# Patient Record
Sex: MALE | Race: OTHER | HISPANIC OR LATINO | Employment: UNEMPLOYED | URBAN - METROPOLITAN AREA
[De-identification: names, ages, dates, MRNs, and addresses within clinical notes are randomized per-mention and may not be internally consistent; named-entity substitution may affect disease eponyms.]

---

## 2019-07-03 ENCOUNTER — OFFICE VISIT (OUTPATIENT)
Dept: FAMILY MEDICINE CLINIC | Facility: CLINIC | Age: 29
End: 2019-07-03
Payer: COMMERCIAL

## 2019-07-03 VITALS
TEMPERATURE: 97.8 F | RESPIRATION RATE: 20 BRPM | HEIGHT: 67 IN | OXYGEN SATURATION: 96 % | WEIGHT: 174 LBS | SYSTOLIC BLOOD PRESSURE: 130 MMHG | BODY MASS INDEX: 27.31 KG/M2 | DIASTOLIC BLOOD PRESSURE: 88 MMHG | HEART RATE: 70 BPM

## 2019-07-03 DIAGNOSIS — Z13.220 SCREENING FOR CHOLESTEROL LEVEL: ICD-10-CM

## 2019-07-03 DIAGNOSIS — R73.9 HYPERGLYCEMIA: ICD-10-CM

## 2019-07-03 DIAGNOSIS — M25.512 ACUTE PAIN OF LEFT SHOULDER: Primary | ICD-10-CM

## 2019-07-03 DIAGNOSIS — R03.0 BLOOD PRESSURE ELEVATED WITHOUT HISTORY OF HTN: ICD-10-CM

## 2019-07-03 DIAGNOSIS — Z00.01 ENCOUNTER FOR WELL ADULT EXAM WITH ABNORMAL FINDINGS: ICD-10-CM

## 2019-07-03 DIAGNOSIS — E66.3 OVERWEIGHT (BMI 25.0-29.9): ICD-10-CM

## 2019-07-03 PROCEDURE — 99203 OFFICE O/P NEW LOW 30 MIN: CPT | Performed by: NURSE PRACTITIONER

## 2019-07-03 NOTE — PROGRESS NOTES
Assessment/Plan:    Acute pain of left shoulder  I recommended use of ice over shoulder for 20-30 minutes at a time as often as every 2 hours  If this is not helpful I also advised patient to rotate with heat  Use of NSAIDs or muscle relaxants also recommended  Avoidance of repeated overhead movements  Recommendation is  physical therapy which teaches exercises to help improve movement and strength, and to decrease pain      -Advised to keep his upcoming appointment with ortho to follow up on his shoulder pain and possible injections  BMI Counseling: Body mass index is 27 25 kg/m²  Discussed the patient's BMI with him  The BMI is above average  BMI counseling and education was provided to the patient  Nutrition recommendations include reducing portion sizes, 3-5 servings of fruits/vegetables daily, moderation in carbohydrate intake, increasing intake of lean protein and reducing intake of cholesterol  Exercise recommendations include moderate aerobic physical activity for 150 minutes/week  Diagnoses and all orders for this visit:    Acute pain of left shoulder    Blood pressure elevated without history of HTN    Encounter for well adult exam with abnormal findings  -     CBC and differential; Future    Overweight (BMI 25 0-29 9)  -     TSH, 3rd generation with Free T4 reflex; Future    Screening for cholesterol level  -     Lipid panel; Future    Hyperglycemia  -     Comprehensive metabolic panel; Future          Subjective:      Patient ID: Iban Anna is a 26 y o  male  26year old male patient here to establish care  Was not seeing any PCP  Latest physical 2 years ago through his job  PMHx: no significant medical diagnosis  Has an appointment with DO 7/11/19  Shoulder Pain    The pain is present in the left wrist and left shoulder  This is a recurrent problem  The current episode started more than 1 year ago  There has been a history of trauma  The problem occurs constantly   The problem has been gradually worsening  The quality of the pain is described as aching  The pain is at a severity of 6/10  The pain is moderate  Associated symptoms include a limited range of motion  Pertinent negatives include no fever, inability to bear weight, itching, joint swelling, numbness, stiffness or tingling  The symptoms are aggravated by activity  Treatments tried: steroids  The treatment provided no relief  Family history does not include gout or rheumatoid arthritis  The following portions of the patient's history were reviewed and updated as appropriate: allergies, current medications, past family history, past medical history, past social history, past surgical history and problem list     Review of Systems   Constitutional: Negative  Negative for fatigue and fever  HENT: Negative  Negative for congestion  Eyes: Negative  Respiratory: Negative  Negative for chest tightness, shortness of breath and wheezing  Cardiovascular: Negative  Negative for chest pain and palpitations  Gastrointestinal: Negative  Endocrine: Negative  Genitourinary: Negative  Musculoskeletal: Positive for arthralgias (shoulder pain)  Negative for gait problem, joint swelling, neck pain, neck stiffness and stiffness  Skin: Negative  Negative for itching  Allergic/Immunologic: Negative  Neurological: Negative  Negative for tingling and numbness  Hematological: Negative  Psychiatric/Behavioral: Negative  Objective:      /88   Pulse 70   Temp 97 8 °F (36 6 °C) (Temporal)   Resp 20   Ht 5' 7" (1 702 m)   Wt 78 9 kg (174 lb)   SpO2 96%   BMI 27 25 kg/m²          Physical Exam   Constitutional: He is oriented to person, place, and time  He appears well-developed and well-nourished  No distress  HENT:   Head: Normocephalic and atraumatic  Right Ear: External ear normal    Left Ear: External ear normal    Eyes: Pupils are equal, round, and reactive to light   Conjunctivae and EOM are normal    Neck: Normal range of motion  Neck supple  Cardiovascular: Normal rate, regular rhythm, normal heart sounds and intact distal pulses  Exam reveals no gallop and no friction rub  No murmur heard  Pulmonary/Chest: Effort normal and breath sounds normal  No respiratory distress  He has no wheezes  Abdominal: Soft  Bowel sounds are normal    Musculoskeletal: He exhibits tenderness  Left shoulder: He exhibits decreased range of motion, tenderness and crepitus  He exhibits no swelling  Lymphadenopathy:     He has no cervical adenopathy  Neurological: He is alert and oriented to person, place, and time  Skin: Skin is warm and dry  Capillary refill takes less than 2 seconds  He is not diaphoretic  Nursing note and vitals reviewed

## 2019-07-05 PROBLEM — M25.512 ACUTE PAIN OF LEFT SHOULDER: Status: ACTIVE | Noted: 2019-07-05

## 2019-07-05 NOTE — ASSESSMENT & PLAN NOTE
I recommended use of ice over shoulder for 20-30 minutes at a time as often as every 2 hours  If this is not helpful I also advised patient to rotate with heat  Use of NSAIDs or muscle relaxants also recommended  Avoidance of repeated overhead movements  Recommendation is  physical therapy which teaches exercises to help improve movement and strength, and to decrease pain      -Advised to keep his upcoming appointment with ortho to follow up on his shoulder pain and possible injections

## 2019-07-27 ENCOUNTER — APPOINTMENT (OUTPATIENT)
Dept: LAB | Facility: HOSPITAL | Age: 29
End: 2019-07-27
Payer: COMMERCIAL

## 2019-07-27 DIAGNOSIS — Z13.220 SCREENING FOR CHOLESTEROL LEVEL: ICD-10-CM

## 2019-07-27 DIAGNOSIS — Z00.01 ENCOUNTER FOR WELL ADULT EXAM WITH ABNORMAL FINDINGS: ICD-10-CM

## 2019-07-27 DIAGNOSIS — E66.3 OVERWEIGHT (BMI 25.0-29.9): ICD-10-CM

## 2019-07-27 DIAGNOSIS — R73.9 HYPERGLYCEMIA: ICD-10-CM

## 2019-07-27 LAB
ALBUMIN SERPL BCP-MCNC: 4.2 G/DL (ref 3.5–5)
ALP SERPL-CCNC: 70 U/L (ref 46–116)
ALT SERPL W P-5'-P-CCNC: 35 U/L (ref 12–78)
ANION GAP SERPL CALCULATED.3IONS-SCNC: 8 MMOL/L (ref 4–13)
AST SERPL W P-5'-P-CCNC: 22 U/L (ref 5–45)
BASOPHILS # BLD AUTO: 0.02 THOUSANDS/ΜL (ref 0–0.1)
BASOPHILS NFR BLD AUTO: 1 % (ref 0–1)
BILIRUB SERPL-MCNC: 0.49 MG/DL (ref 0.2–1)
BUN SERPL-MCNC: 17 MG/DL (ref 5–25)
CALCIUM SERPL-MCNC: 8.7 MG/DL (ref 8.3–10.1)
CHLORIDE SERPL-SCNC: 103 MMOL/L (ref 100–108)
CHOLEST SERPL-MCNC: 238 MG/DL (ref 50–200)
CO2 SERPL-SCNC: 28 MMOL/L (ref 21–32)
CREAT SERPL-MCNC: 1.37 MG/DL (ref 0.6–1.3)
EOSINOPHIL # BLD AUTO: 0.04 THOUSAND/ΜL (ref 0–0.61)
EOSINOPHIL NFR BLD AUTO: 2 % (ref 0–6)
ERYTHROCYTE [DISTWIDTH] IN BLOOD BY AUTOMATED COUNT: 12.2 % (ref 11.6–15.1)
GFR SERPL CREATININE-BSD FRML MDRD: 69 ML/MIN/1.73SQ M
GLUCOSE P FAST SERPL-MCNC: 108 MG/DL (ref 65–99)
HCT VFR BLD AUTO: 44.6 % (ref 36.5–49.3)
HDLC SERPL-MCNC: 35 MG/DL (ref 40–60)
HGB BLD-MCNC: 15.8 G/DL (ref 12–17)
IMM GRANULOCYTES # BLD AUTO: 0.01 THOUSAND/UL (ref 0–0.2)
IMM GRANULOCYTES NFR BLD AUTO: 0 % (ref 0–2)
LDLC SERPL CALC-MCNC: 142 MG/DL (ref 0–100)
LYMPHOCYTES # BLD AUTO: 1.39 THOUSANDS/ΜL (ref 0.6–4.47)
LYMPHOCYTES NFR BLD AUTO: 61 % (ref 14–44)
MCH RBC QN AUTO: 31.5 PG (ref 26.8–34.3)
MCHC RBC AUTO-ENTMCNC: 35.4 G/DL (ref 31.4–37.4)
MCV RBC AUTO: 89 FL (ref 82–98)
MONOCYTES # BLD AUTO: 0.16 THOUSAND/ΜL (ref 0.17–1.22)
MONOCYTES NFR BLD AUTO: 7 % (ref 4–12)
NEUTROPHILS # BLD AUTO: 0.67 THOUSANDS/ΜL (ref 1.85–7.62)
NEUTS SEG NFR BLD AUTO: 29 % (ref 43–75)
NONHDLC SERPL-MCNC: 203 MG/DL
NRBC BLD AUTO-RTO: 0 /100 WBCS
PLATELET # BLD AUTO: 246 THOUSANDS/UL (ref 149–390)
PMV BLD AUTO: 10 FL (ref 8.9–12.7)
POTASSIUM SERPL-SCNC: 4.2 MMOL/L (ref 3.5–5.3)
PROT SERPL-MCNC: 7.9 G/DL (ref 6.4–8.2)
RBC # BLD AUTO: 5.01 MILLION/UL (ref 3.88–5.62)
SODIUM SERPL-SCNC: 139 MMOL/L (ref 136–145)
TRIGL SERPL-MCNC: 306 MG/DL
TSH SERPL DL<=0.05 MIU/L-ACNC: 1.96 UIU/ML (ref 0.36–3.74)
WBC # BLD AUTO: 2.29 THOUSAND/UL (ref 4.31–10.16)

## 2019-07-27 PROCEDURE — 80061 LIPID PANEL: CPT

## 2019-07-27 PROCEDURE — 80053 COMPREHEN METABOLIC PANEL: CPT

## 2019-07-27 PROCEDURE — 36415 COLL VENOUS BLD VENIPUNCTURE: CPT

## 2019-07-27 PROCEDURE — 85025 COMPLETE CBC W/AUTO DIFF WBC: CPT

## 2019-07-27 PROCEDURE — 84443 ASSAY THYROID STIM HORMONE: CPT

## 2019-07-30 ENCOUNTER — OFFICE VISIT (OUTPATIENT)
Dept: FAMILY MEDICINE CLINIC | Facility: CLINIC | Age: 29
End: 2019-07-30
Payer: COMMERCIAL

## 2019-07-30 VITALS
BODY MASS INDEX: 27.62 KG/M2 | OXYGEN SATURATION: 97 % | HEIGHT: 67 IN | HEART RATE: 76 BPM | TEMPERATURE: 97 F | RESPIRATION RATE: 20 BRPM | SYSTOLIC BLOOD PRESSURE: 124 MMHG | WEIGHT: 176 LBS | DIASTOLIC BLOOD PRESSURE: 84 MMHG

## 2019-07-30 DIAGNOSIS — R89.9 ABNORMAL LABORATORY TEST: ICD-10-CM

## 2019-07-30 DIAGNOSIS — R73.9 HYPERGLYCEMIA: ICD-10-CM

## 2019-07-30 DIAGNOSIS — Z00.00 ANNUAL PHYSICAL EXAM: Primary | ICD-10-CM

## 2019-07-30 DIAGNOSIS — L98.9 LESION OF LOWER EXTREMITY: ICD-10-CM

## 2019-07-30 DIAGNOSIS — E78.2 MIXED HYPERLIPIDEMIA: ICD-10-CM

## 2019-07-30 PROCEDURE — 99395 PREV VISIT EST AGE 18-39: CPT | Performed by: NURSE PRACTITIONER

## 2019-07-30 RX ORDER — MELOXICAM 15 MG/1
15 TABLET ORAL DAILY
COMMUNITY
End: 2019-08-08 | Stop reason: HOSPADM

## 2019-07-30 NOTE — PROGRESS NOTES
ADULT ANNUAL PHYSICAL  [facility]    NAME: Vinod Haddad  AGE: 26 y o  SEX: male  : 1990     DATE: 2019     Assessment and Plan:     Problem List Items Addressed This Visit        Other    Annual physical exam - Primary     Patient is here for physical exam we find this visit without any distress or abnormalities  We recommended exercise at least three and 0 5 hours per week and healthy diet with a low carbohydrate and low saturated fat  Began to follow up in one year for regular physical exams  Mixed hyperlipidemia     Recommend  lifestyle modifications like eating a heart healthy diet, regular exercises, and maintaining a  desirable body weight  Advise on reduced added sugars, increase omega-3, eliminate trans fat and saturated fat  Also recommend to avoid fried and fatty foods and limit sodium intake  Other Visit Diagnoses     Hyperglycemia        Relevant Orders    HEMOGLOBIN A1C W/ EAG ESTIMATION    Abnormal laboratory test        Relevant Orders    CBC and differential    Lesion of lower extremity        Relevant Orders    Ambulatory referral to Dermatology          Immunizations and preventive care screenings were discussed with patient today  Appropriate education was printed on patient's after visit summary  Counseling:  · BMI Counseling: Body mass index is 27 57 kg/m²  Discussed the patient's BMI with him  The BMI is above average  BMI counseling and education was provided to the patient  Nutrition recommendations include reducing portion sizes, 3-5 servings of fruits/vegetables daily, reducing fast food intake, decreasing soda and/or juice intake, moderation in carbohydrate intake and reducing intake of cholesterol  Exercise recommendations include moderate aerobic physical activity for 150 minutes/week  Return in about 6 months (around 2020) for Recheck cholesterol       Chief Complaint:     Chief Complaint   Patient presents with    Physical Exam      History of Present Illness:     Adult Annual Physical   Patient here for a comprehensive physical exam  The patient reports no problems  Diet and Physical Activity  · Diet/Nutrition: high fat diet  · Exercise: no formal exercise  Depression Screening  PHQ-9 Depression Screening    PHQ-9:    Frequency of the following problems over the past two weeks:            General Health  · Sleep: sleeps well  · Hearing: normal - bilateral   · Vision: no vision problems and wears glasses  · Dental: brushes teeth once daily and brushes teeth twice daily   Health  · History of STDs?: no      Review of Systems:     Review of Systems   Constitutional: Negative  Negative for appetite change, fatigue and unexpected weight change  HENT: Negative  Eyes: Negative  Respiratory: Negative  Negative for cough, chest tightness and wheezing  Cardiovascular: Negative  Negative for chest pain and palpitations  Gastrointestinal: Negative  Endocrine: Negative  Genitourinary: Negative  Musculoskeletal: Negative  Negative for arthralgias and gait problem  Skin: Negative  Negative for color change and rash  Allergic/Immunologic: Negative  Neurological: Negative  Hematological: Negative  Negative for adenopathy  Does not bruise/bleed easily  Psychiatric/Behavioral: Negative  Past Medical History:     History reviewed  No pertinent past medical history  Past Surgical History:     History reviewed  No pertinent surgical history     Social History:     Social History     Socioeconomic History    Marital status: Single     Spouse name: None    Number of children: None    Years of education: None    Highest education level: None   Occupational History    None   Social Needs    Financial resource strain: None    Food insecurity:     Worry: None     Inability: None    Transportation needs:     Medical: None     Non-medical: None   Tobacco Use    Smoking status: Never Smoker    Smokeless tobacco: Never Used   Substance and Sexual Activity    Alcohol use: Yes     Frequency: Monthly or less    Drug use: Never    Sexual activity: Yes     Partners: Female   Lifestyle    Physical activity:     Days per week: None     Minutes per session: None    Stress: None   Relationships    Social connections:     Talks on phone: None     Gets together: None     Attends Christian service: None     Active member of club or organization: None     Attends meetings of clubs or organizations: None     Relationship status: None    Intimate partner violence:     Fear of current or ex partner: None     Emotionally abused: None     Physically abused: None     Forced sexual activity: None   Other Topics Concern    None   Social History Narrative    None      Family History:     Family History   Problem Relation Age of Onset    Hypertension Mother     Hyperlipidemia Mother     Hypertension Father     Hyperlipidemia Father       Current Medications:     Current Outpatient Medications   Medication Sig Dispense Refill    meloxicam (MOBIC) 15 mg tablet Take 15 mg by mouth daily       No current facility-administered medications for this visit  Allergies: Allergies   Allergen Reactions    No Known Allergies       Physical Exam:     /84   Pulse 76   Temp (!) 97 °F (36 1 °C) (Temporal)   Resp 20   Ht 5' 7" (1 702 m)   Wt 79 8 kg (176 lb)   SpO2 97%   BMI 27 57 kg/m²     Physical Exam   Constitutional: He is oriented to person, place, and time  He appears well-developed and well-nourished  No distress  HENT:   Head: Normocephalic and atraumatic  Right Ear: External ear normal    Left Ear: External ear normal    Nose: Nose normal    Mouth/Throat: Oropharynx is clear and moist    Eyes: Pupils are equal, round, and reactive to light  Conjunctivae and EOM are normal  Right eye exhibits no discharge   Left eye exhibits no discharge  Neck: Normal range of motion  Neck supple  No thyromegaly present  Cardiovascular: Normal rate, regular rhythm and intact distal pulses  Exam reveals no gallop and no friction rub  No murmur heard  Pulmonary/Chest: Effort normal and breath sounds normal  No respiratory distress  He has no wheezes  Abdominal: Soft  Bowel sounds are normal  He exhibits no distension  There is no tenderness  Musculoskeletal: Normal range of motion  Neurological: He is alert and oriented to person, place, and time  He has normal reflexes  Skin: Skin is warm and dry  Capillary refill takes less than 2 seconds  Rash noted  He is not diaphoretic  There is erythema  Psychiatric: He has a normal mood and affect  His behavior is normal  Judgment and thought content normal    Nursing note and vitals reviewed        Sarai Guerrero, [address]

## 2019-07-30 NOTE — PATIENT INSTRUCTIONS

## 2019-08-07 ENCOUNTER — APPOINTMENT (EMERGENCY)
Dept: RADIOLOGY | Facility: HOSPITAL | Age: 29
DRG: 287 | End: 2019-08-07
Payer: COMMERCIAL

## 2019-08-07 ENCOUNTER — APPOINTMENT (EMERGENCY)
Dept: NON INVASIVE DIAGNOSTICS | Facility: HOSPITAL | Age: 29
DRG: 287 | End: 2019-08-07
Payer: COMMERCIAL

## 2019-08-07 ENCOUNTER — HOSPITAL ENCOUNTER (INPATIENT)
Facility: HOSPITAL | Age: 29
LOS: 1 days | Discharge: HOME | DRG: 287 | End: 2019-08-08
Attending: EMERGENCY MEDICINE | Admitting: INTERNAL MEDICINE
Payer: COMMERCIAL

## 2019-08-07 DIAGNOSIS — I10 HYPERTENSION: ICD-10-CM

## 2019-08-07 DIAGNOSIS — N28.9 RENAL INSUFFICIENCY: ICD-10-CM

## 2019-08-07 DIAGNOSIS — R73.09 ELEVATED GLUCOSE: ICD-10-CM

## 2019-08-07 DIAGNOSIS — I20.1 CORONARY VASOSPASM (HCC): ICD-10-CM

## 2019-08-07 DIAGNOSIS — I21.4 NSTEMI (NON-ST ELEVATED MYOCARDIAL INFARCTION) (HCC): Primary | ICD-10-CM

## 2019-08-07 PROBLEM — N18.2 STAGE 2 CHRONIC KIDNEY DISEASE: Status: ACTIVE | Noted: 2019-08-07

## 2019-08-07 PROBLEM — R07.9 CHEST PAIN: Status: ACTIVE | Noted: 2019-08-07

## 2019-08-07 PROBLEM — N18.30 STAGE 3 CHRONIC KIDNEY DISEASE (HCC): Status: ACTIVE | Noted: 2019-08-07

## 2019-08-07 LAB
AMPHETAMINES SERPL QL SCN: NEGATIVE
ANION GAP SERPL CALCULATED.3IONS-SCNC: 8 MMOL/L (ref 4–13)
APTT PPP: 31 SECONDS (ref 23–37)
APTT PPP: 53 SECONDS (ref 23–37)
APTT PPP: 70 SECONDS (ref 23–37)
BACTERIA UR QL AUTO: NORMAL /HPF
BARBITURATES UR QL: NEGATIVE
BASOPHILS # BLD AUTO: 0.02 THOUSANDS/ΜL (ref 0–0.1)
BASOPHILS NFR BLD AUTO: 1 % (ref 0–1)
BENZODIAZ UR QL: NEGATIVE
BILIRUB UR QL STRIP: NEGATIVE
BUN SERPL-MCNC: 14 MG/DL (ref 5–25)
CALCIUM SERPL-MCNC: 9.7 MG/DL (ref 8.3–10.1)
CHLORIDE SERPL-SCNC: 101 MMOL/L (ref 100–108)
CK MB SERPL-MCNC: 2.2 % (ref 0–2.5)
CK MB SERPL-MCNC: 4.4 NG/ML (ref 0–5)
CK SERPL-CCNC: 203 U/L (ref 39–308)
CLARITY UR: CLEAR
CO2 SERPL-SCNC: 30 MMOL/L (ref 21–32)
COCAINE UR QL: NEGATIVE
COLOR UR: YELLOW
CREAT SERPL-MCNC: 1.44 MG/DL (ref 0.6–1.3)
EOSINOPHIL # BLD AUTO: 0.02 THOUSAND/ΜL (ref 0–0.61)
EOSINOPHIL NFR BLD AUTO: 1 % (ref 0–6)
ERYTHROCYTE [DISTWIDTH] IN BLOOD BY AUTOMATED COUNT: 11.7 % (ref 11.6–15.1)
GFR SERPL CREATININE-BSD FRML MDRD: 65 ML/MIN/1.73SQ M
GLUCOSE SERPL-MCNC: 120 MG/DL (ref 65–140)
GLUCOSE UR STRIP-MCNC: NEGATIVE MG/DL
HCT VFR BLD AUTO: 45.7 % (ref 36.5–49.3)
HGB BLD-MCNC: 16.3 G/DL (ref 12–17)
HGB UR QL STRIP.AUTO: NEGATIVE
IMM GRANULOCYTES # BLD AUTO: 0.02 THOUSAND/UL (ref 0–0.2)
IMM GRANULOCYTES NFR BLD AUTO: 1 % (ref 0–2)
INR PPP: 0.96 (ref 0.84–1.19)
KETONES UR STRIP-MCNC: NEGATIVE MG/DL
LEUKOCYTE ESTERASE UR QL STRIP: NEGATIVE
LYMPHOCYTES # BLD AUTO: 1.91 THOUSANDS/ΜL (ref 0.6–4.47)
LYMPHOCYTES NFR BLD AUTO: 45 % (ref 14–44)
MCH RBC QN AUTO: 31.2 PG (ref 26.8–34.3)
MCHC RBC AUTO-ENTMCNC: 35.7 G/DL (ref 31.4–37.4)
MCV RBC AUTO: 88 FL (ref 82–98)
METHADONE UR QL: NEGATIVE
MONOCYTES # BLD AUTO: 0.26 THOUSAND/ΜL (ref 0.17–1.22)
MONOCYTES NFR BLD AUTO: 6 % (ref 4–12)
NEUTROPHILS # BLD AUTO: 2.03 THOUSANDS/ΜL (ref 1.85–7.62)
NEUTS SEG NFR BLD AUTO: 46 % (ref 43–75)
NITRITE UR QL STRIP: NEGATIVE
NON-SQ EPI CELLS URNS QL MICRO: NORMAL /HPF
NRBC BLD AUTO-RTO: 0 /100 WBCS
NT-PROBNP SERPL-MCNC: 22 PG/ML
OPIATES UR QL SCN: NEGATIVE
PCP UR QL: NEGATIVE
PH UR STRIP.AUTO: 5.5 [PH] (ref 4.5–8)
PLATELET # BLD AUTO: 275 THOUSANDS/UL (ref 149–390)
PMV BLD AUTO: 9.2 FL (ref 8.9–12.7)
POTASSIUM SERPL-SCNC: 3.9 MMOL/L (ref 3.5–5.3)
PROT UR STRIP-MCNC: ABNORMAL MG/DL
PROTHROMBIN TIME: 12.9 SECONDS (ref 11.6–14.5)
RBC # BLD AUTO: 5.22 MILLION/UL (ref 3.88–5.62)
RBC #/AREA URNS AUTO: NORMAL /HPF
SODIUM SERPL-SCNC: 139 MMOL/L (ref 136–145)
SP GR UR STRIP.AUTO: 1.02 (ref 1–1.03)
THC UR QL: NEGATIVE
TROPONIN I SERPL-MCNC: 4.16 NG/ML
TROPONIN I SERPL-MCNC: 5.65 NG/ML
TROPONIN I SERPL-MCNC: 6.18 NG/ML
UROBILINOGEN UR QL STRIP.AUTO: 0.2 E.U./DL
WBC # BLD AUTO: 4.26 THOUSAND/UL (ref 4.31–10.16)
WBC #/AREA URNS AUTO: NORMAL /HPF

## 2019-08-07 PROCEDURE — 81001 URINALYSIS AUTO W/SCOPE: CPT

## 2019-08-07 PROCEDURE — 82550 ASSAY OF CK (CPK): CPT | Performed by: EMERGENCY MEDICINE

## 2019-08-07 PROCEDURE — 85025 COMPLETE CBC W/AUTO DIFF WBC: CPT | Performed by: EMERGENCY MEDICINE

## 2019-08-07 PROCEDURE — 80048 BASIC METABOLIC PNL TOTAL CA: CPT | Performed by: EMERGENCY MEDICINE

## 2019-08-07 PROCEDURE — 84484 ASSAY OF TROPONIN QUANT: CPT | Performed by: EMERGENCY MEDICINE

## 2019-08-07 PROCEDURE — 93306 TTE W/DOPPLER COMPLETE: CPT

## 2019-08-07 PROCEDURE — 71046 X-RAY EXAM CHEST 2 VIEWS: CPT

## 2019-08-07 PROCEDURE — 85730 THROMBOPLASTIN TIME PARTIAL: CPT | Performed by: INTERNAL MEDICINE

## 2019-08-07 PROCEDURE — 99223 1ST HOSP IP/OBS HIGH 75: CPT | Performed by: INTERNAL MEDICINE

## 2019-08-07 PROCEDURE — 81002 URINALYSIS NONAUTO W/O SCOPE: CPT | Performed by: EMERGENCY MEDICINE

## 2019-08-07 PROCEDURE — 99291 CRITICAL CARE FIRST HOUR: CPT | Performed by: EMERGENCY MEDICINE

## 2019-08-07 PROCEDURE — 99291 CRITICAL CARE FIRST HOUR: CPT

## 2019-08-07 PROCEDURE — 36415 COLL VENOUS BLD VENIPUNCTURE: CPT | Performed by: EMERGENCY MEDICINE

## 2019-08-07 PROCEDURE — 96375 TX/PRO/DX INJ NEW DRUG ADDON: CPT

## 2019-08-07 PROCEDURE — 96374 THER/PROPH/DIAG INJ IV PUSH: CPT

## 2019-08-07 PROCEDURE — 83880 ASSAY OF NATRIURETIC PEPTIDE: CPT | Performed by: EMERGENCY MEDICINE

## 2019-08-07 PROCEDURE — 82553 CREATINE MB FRACTION: CPT | Performed by: EMERGENCY MEDICINE

## 2019-08-07 PROCEDURE — 80307 DRUG TEST PRSMV CHEM ANLYZR: CPT | Performed by: EMERGENCY MEDICINE

## 2019-08-07 PROCEDURE — 85730 THROMBOPLASTIN TIME PARTIAL: CPT | Performed by: EMERGENCY MEDICINE

## 2019-08-07 PROCEDURE — 85610 PROTHROMBIN TIME: CPT | Performed by: EMERGENCY MEDICINE

## 2019-08-07 PROCEDURE — 99222 1ST HOSP IP/OBS MODERATE 55: CPT | Performed by: INTERNAL MEDICINE

## 2019-08-07 PROCEDURE — 84484 ASSAY OF TROPONIN QUANT: CPT | Performed by: INTERNAL MEDICINE

## 2019-08-07 PROCEDURE — 93306 TTE W/DOPPLER COMPLETE: CPT | Performed by: INTERNAL MEDICINE

## 2019-08-07 RX ORDER — CLOPIDOGREL BISULFATE 75 MG/1
600 TABLET ORAL ONCE
Status: COMPLETED | OUTPATIENT
Start: 2019-08-07 | End: 2019-08-07

## 2019-08-07 RX ORDER — ATORVASTATIN CALCIUM 40 MG/1
40 TABLET, FILM COATED ORAL
Status: DISCONTINUED | OUTPATIENT
Start: 2019-08-07 | End: 2019-08-07 | Stop reason: SDUPTHER

## 2019-08-07 RX ORDER — MAGNESIUM HYDROXIDE/ALUMINUM HYDROXICE/SIMETHICONE 120; 1200; 1200 MG/30ML; MG/30ML; MG/30ML
30 SUSPENSION ORAL ONCE
Status: COMPLETED | OUTPATIENT
Start: 2019-08-07 | End: 2019-08-07

## 2019-08-07 RX ORDER — HEPARIN SODIUM 1000 [USP'U]/ML
2000 INJECTION, SOLUTION INTRAVENOUS; SUBCUTANEOUS AS NEEDED
Status: DISCONTINUED | OUTPATIENT
Start: 2019-08-07 | End: 2019-08-08 | Stop reason: HOSPADM

## 2019-08-07 RX ORDER — HEPARIN SODIUM 1000 [USP'U]/ML
4000 INJECTION, SOLUTION INTRAVENOUS; SUBCUTANEOUS ONCE
Status: COMPLETED | OUTPATIENT
Start: 2019-08-07 | End: 2019-08-07

## 2019-08-07 RX ORDER — ATORVASTATIN CALCIUM 80 MG/1
80 TABLET, FILM COATED ORAL
Status: DISCONTINUED | OUTPATIENT
Start: 2019-08-07 | End: 2019-08-08 | Stop reason: HOSPADM

## 2019-08-07 RX ORDER — LIDOCAINE HYDROCHLORIDE 20 MG/ML
10 SOLUTION OROPHARYNGEAL ONCE
Status: COMPLETED | OUTPATIENT
Start: 2019-08-07 | End: 2019-08-07

## 2019-08-07 RX ORDER — NITROGLYCERIN 0.4 MG/1
0.4 TABLET SUBLINGUAL ONCE
Status: COMPLETED | OUTPATIENT
Start: 2019-08-07 | End: 2019-08-07

## 2019-08-07 RX ORDER — KETOROLAC TROMETHAMINE 30 MG/ML
15 INJECTION, SOLUTION INTRAMUSCULAR; INTRAVENOUS ONCE
Status: COMPLETED | OUTPATIENT
Start: 2019-08-07 | End: 2019-08-07

## 2019-08-07 RX ORDER — HEPARIN SODIUM 1000 [USP'U]/ML
4000 INJECTION, SOLUTION INTRAVENOUS; SUBCUTANEOUS AS NEEDED
Status: DISCONTINUED | OUTPATIENT
Start: 2019-08-07 | End: 2019-08-08 | Stop reason: HOSPADM

## 2019-08-07 RX ORDER — ASPIRIN 81 MG/1
81 TABLET ORAL DAILY
Status: DISCONTINUED | OUTPATIENT
Start: 2019-08-08 | End: 2019-08-07 | Stop reason: SDUPTHER

## 2019-08-07 RX ORDER — NITROGLYCERIN 0.4 MG/1
0.4 TABLET SUBLINGUAL
Status: DISCONTINUED | OUTPATIENT
Start: 2019-08-07 | End: 2019-08-08 | Stop reason: HOSPADM

## 2019-08-07 RX ORDER — ASPIRIN 325 MG
325 TABLET ORAL ONCE
Status: COMPLETED | OUTPATIENT
Start: 2019-08-07 | End: 2019-08-07

## 2019-08-07 RX ORDER — HEPARIN SODIUM 10000 [USP'U]/100ML
3-20 INJECTION, SOLUTION INTRAVENOUS
Status: DISCONTINUED | OUTPATIENT
Start: 2019-08-07 | End: 2019-08-08 | Stop reason: HOSPADM

## 2019-08-07 RX ORDER — ASPIRIN 81 MG/1
81 TABLET ORAL DAILY
Status: DISCONTINUED | OUTPATIENT
Start: 2019-08-08 | End: 2019-08-08 | Stop reason: HOSPADM

## 2019-08-07 RX ORDER — SODIUM CHLORIDE 9 MG/ML
100 INJECTION, SOLUTION INTRAVENOUS CONTINUOUS
Status: DISCONTINUED | OUTPATIENT
Start: 2019-08-07 | End: 2019-08-08

## 2019-08-07 RX ADMIN — METOPROLOL TARTRATE 12.5 MG: 25 TABLET ORAL at 20:40

## 2019-08-07 RX ADMIN — NITROGLYCERIN 0.4 MG: 0.4 TABLET SUBLINGUAL at 09:46

## 2019-08-07 RX ADMIN — HEPARIN SODIUM AND DEXTROSE 12 UNITS/KG/HR: 10000; 5 INJECTION INTRAVENOUS at 09:52

## 2019-08-07 RX ADMIN — CLOPIDOGREL BISULFATE 600 MG: 75 TABLET ORAL at 10:56

## 2019-08-07 RX ADMIN — METOPROLOL TARTRATE 12.5 MG: 25 TABLET ORAL at 16:22

## 2019-08-07 RX ADMIN — SODIUM CHLORIDE 75 ML/HR: 0.9 INJECTION, SOLUTION INTRAVENOUS at 10:37

## 2019-08-07 RX ADMIN — KETOROLAC TROMETHAMINE 15 MG: 30 INJECTION, SOLUTION INTRAMUSCULAR at 08:30

## 2019-08-07 RX ADMIN — ATORVASTATIN CALCIUM 80 MG: 80 TABLET, FILM COATED ORAL at 16:22

## 2019-08-07 RX ADMIN — ALUMINUM HYDROXIDE, MAGNESIUM HYDROXIDE, AND SIMETHICONE 30 ML: 200; 200; 20 SUSPENSION ORAL at 08:33

## 2019-08-07 RX ADMIN — ASPIRIN 325 MG ORAL TABLET 325 MG: 325 PILL ORAL at 09:31

## 2019-08-07 RX ADMIN — HEPARIN SODIUM 2000 UNITS: 1000 INJECTION INTRAVENOUS; SUBCUTANEOUS at 20:42

## 2019-08-07 RX ADMIN — LIDOCAINE HYDROCHLORIDE 10 ML: 20 SOLUTION ORAL; TOPICAL at 22:13

## 2019-08-07 RX ADMIN — ALUMINUM HYDROXIDE, MAGNESIUM HYDROXIDE, AND SIMETHICONE 30 ML: 200; 200; 20 SUSPENSION ORAL at 22:12

## 2019-08-07 RX ADMIN — LIDOCAINE HYDROCHLORIDE 10 ML: 20 SOLUTION ORAL; TOPICAL at 08:34

## 2019-08-07 RX ADMIN — NITROGLYCERIN 1 INCH: 20 OINTMENT TOPICAL at 09:33

## 2019-08-07 RX ADMIN — HEPARIN SODIUM 4000 UNITS: 1000 INJECTION, SOLUTION INTRAVENOUS; SUBCUTANEOUS at 09:48

## 2019-08-07 NOTE — PROGRESS NOTES
RN reported troponins 6 18 and 5 65 to PA with Cardiology  Pt continues on heparin gtt  Cardiac cath for 08/8/19    Pt comfortable, has no pain  Pt has call bell in hand  [facility]

## 2019-08-07 NOTE — LETTER
[address]  Dept: [phone]    August 8, 2019     Patient: Beth Meier   YOB: 1990   Date of Visit: 8/7/2019       To Whom it May Concern:    Beth Meier is under my professional care  He was seen in the hospital from 8/7/2019   to 08/08/19  He was hospitalized for a serious medical condition  His wife Marya Lane was present in the hospital with him  If you have any questions or concerns, please don't hesitate to call           Sincerely,          Bita Lane MD

## 2019-08-07 NOTE — ED PROVIDER NOTES
History  Chief Complaint   Patient presents with    Chest Pain     Chest pain that started yesterday - reports radiation to b/l shoulders  Denies SOB, headache, dizziness  History provided by:  Patient   used: No    Medical Problem - Major   Location:  [facility] with burning in the shoulders  Severity:  Moderate  Onset quality:  Sudden  Duration: since this am when he woke up and had it for 4 hours yesterday in the morning upon awakening yesterday as well from 7am - 11 am    Timing:  Constant  Progression:  Unchanged  Chronicity:  New  Relieved by:  Nothing  Worsened by:  Nothing  Ineffective treatments:  None tried although he noted his blood pressure was high as well yesterday and took a dose of his wife's HCTZ 12 5 yesterday  Recent physical at PCP recently revealed a normal blood pressure  Associated symptoms: chest pain    Associated symptoms: no abdominal pain, no congestion, no cough, no diarrhea, no fever, no headaches, no nausea, no rash, no shortness of breath, no sore throat and no vomiting        Prior to Admission Medications   Prescriptions Last Dose Informant Patient Reported? Taking?   meloxicam (MOBIC) 15 mg tablet Past Week at Unknown time Self Yes Yes   Sig: Take 15 mg by mouth daily      Facility-Administered Medications: None       History reviewed  No pertinent past medical history  History reviewed  No pertinent surgical history  Family History   Problem Relation Age of Onset    Hypertension Mother     Hyperlipidemia Mother     Hypertension Father     Hyperlipidemia Father      I have reviewed and agree with the history as documented  Social History     Tobacco Use    Smoking status: Never Smoker    Smokeless tobacco: Never Used   Substance Use Topics    Alcohol use: Yes     Frequency: Monthly or less    Drug use: Never        Review of Systems   Constitutional: Negative for chills and fever  HENT: Negative for congestion and sore throat  Respiratory: Negative for cough, chest tightness and shortness of breath  Cardiovascular: Positive for chest pain  Negative for leg swelling  Gastrointestinal: Negative for abdominal pain, diarrhea, nausea and vomiting  Genitourinary: Negative for difficulty urinating and dysuria  Musculoskeletal: Negative for back pain and gait problem  Skin: Negative for rash  Neurological: Negative for weakness, numbness and headaches  All other systems reviewed and are negative  Physical Exam  Physical Exam   Constitutional: He appears well-developed and well-nourished  He is cooperative  Non-toxic appearance  He does not have a sickly appearance  He does not appear ill  No distress  HENT:   Head: Normocephalic and atraumatic  Right Ear: Hearing normal  No drainage or swelling  Left Ear: Hearing normal  No drainage or swelling  Mouth/Throat: Mucous membranes are normal    Eyes: Conjunctivae and lids are normal  Right eye exhibits no discharge  Left eye exhibits no discharge  Neck: Trachea normal and normal range of motion  No JVD present  Cardiovascular: Normal rate, regular rhythm, normal heart sounds, intact distal pulses and normal pulses  Exam reveals no gallop and no friction rub  No murmur heard  Pulmonary/Chest: Effort normal and breath sounds normal  No stridor  No respiratory distress  He has no wheezes  He has no rales  He exhibits no tenderness  Abdominal: Soft  Normal appearance  There is no tenderness  There is no rebound, no guarding and no CVA tenderness  Musculoskeletal: Normal range of motion  He exhibits no edema, tenderness or deformity  Lymphadenopathy:     He has no cervical adenopathy  Neurological: He is alert  He has normal strength  No sensory deficit  He exhibits normal muscle tone  GCS eye subscore is 4  GCS verbal subscore is 5  GCS motor subscore is 6  Skin: Skin is warm, dry and intact  No rash noted  He is not diaphoretic  No pallor     Psychiatric: He has a normal mood and affect  His speech is normal  Cognition and memory are normal    Nursing note and vitals reviewed        Vital Signs  ED Triage Vitals   Temperature Pulse Respirations Blood Pressure SpO2   08/07/19 0803 08/07/19 0802 08/07/19 0802 08/07/19 0802 08/07/19 0802   98 2 °F (36 8 °C) (!) 53 18 165/100 100 %      Temp Source Heart Rate Source Patient Position - Orthostatic VS BP Location FiO2 (%)   08/07/19 0803 08/07/19 0802 08/07/19 0802 08/07/19 0802 --   Oral Monitor Lying Right arm       Pain Score       08/07/19 0830       9           Vitals:    08/07/19 0802 08/07/19 0912 08/07/19 0945   BP: 165/100 148/96 152/95   Pulse: (!) 53 60 64   Patient Position - Orthostatic VS: Lying Lying Lying         Visual Acuity      ED Medications  Medications   heparin (porcine) 25,000 units in 250 mL infusion (premix) (has no administration in time range)   heparin (porcine) injection 4,000 Units (has no administration in time range)   heparin (porcine) injection 2,000 Units (has no administration in time range)   ketorolac (TORADOL) injection 15 mg (15 mg Intravenous Given 8/7/19 0830)   aluminum-magnesium hydroxide-simethicone (MYLANTA) 200-200-20 mg/5 mL oral suspension 30 mL (30 mL Oral Given 8/7/19 0833)   Lidocaine Viscous HCl (XYLOCAINE) 2 % mucosal solution 10 mL (10 mL Swish & Swallow Given 8/7/19 0834)   aspirin tablet 325 mg (325 mg Oral Given 8/7/19 0931)   nitroglycerin (NITRO-BID) 2 % TD ointment 1 inch (1 inch Topical Given 8/7/19 0933)   heparin (porcine) injection 4,000 Units (4,000 Units Intravenous Given 8/7/19 0948)   nitroglycerin (NITROSTAT) SL tablet 0 4 mg (0 4 mg Sublingual Given 8/7/19 0946)       Diagnostic Studies  Results Reviewed     Procedure Component Value Units Date/Time    Rapid drug screen, urine [[id number]]  (Normal) Collected:  08/07/19 0907    Lab Status:  Final result Specimen:  Urine, Clean Catch Updated:  08/07/19 0927     Amph/Meth UR Negative     Barbiturate Ur Negative     Benzodiazepine Urine Negative     Cocaine Urine Negative     Methadone Urine Negative     Opiate Urine Negative     PCP Ur Negative     THC Urine Negative    Narrative:       FOR MEDICAL PURPOSES ONLY  IF CONFIRMATION NEEDED PLEASE CONTACT THE LAB WITHIN 5 DAYS      Drug Screen Cutoff Levels:  AMPHETAMINE/METHAMPHETAMINES  1000 ng/mL  BARBITURATES     200 ng/mL  BENZODIAZEPINES     200 ng/mL  COCAINE      300 ng/mL  METHADONE      300 ng/mL  OPIATES      300 ng/mL  PHENCYCLIDINE     25 ng/mL  THC       50 ng/mL      Urine Microscopic [[id number]]  (Normal) Collected:  08/07/19 0918    Lab Status:  Final result Specimen:  Urine, Clean Catch Updated:  08/07/19 0924     RBC, UA None Seen /hpf      WBC, UA None Seen /hpf      Epithelial Cells Occasional /hpf      Bacteria, UA Occasional /hpf     CKMB [[id number]]  (Normal) Collected:  08/07/19 0834    Lab Status:  Final result Specimen:  Blood from Arm, Right Updated:  08/07/19 0920     CK-MB Index 2 2 %      CK-MB 4 4 ng/mL     CK Total with Reflex CKMB [[id number]]  (Normal) Collected:  08/07/19 0834    Lab Status:  Final result Specimen:  Blood from Arm, Right Updated:  08/07/19 0920     Total  U/L     Basic metabolic panel [[id number]]  (Abnormal) Collected:  08/07/19 0834    Lab Status:  Final result Specimen:  Blood from Arm, Right Updated:  08/07/19 0909     Sodium 139 mmol/L      Potassium 3 9 mmol/L      Chloride 101 mmol/L      CO2 30 mmol/L      ANION GAP 8 mmol/L      BUN 14 mg/dL      Creatinine 1 44 mg/dL      Glucose 120 mg/dL      Calcium 9 7 mg/dL      eGFR 65 ml/min/1 73sq m     Narrative:       [facility] guidelines for Chronic Kidney Disease (CKD):     Stage 1 with normal or high GFR (GFR > 90 mL/min/1 73 square meters)    Stage 2 Mild CKD (GFR = 60-89 mL/min/1 73 square meters)    Stage 3A Moderate CKD (GFR = 45-59 mL/min/1 73 square meters)    Stage 3B Moderate CKD (GFR = 30-44 mL/min/1 73 square meters)    Stage 4 Severe CKD (GFR = 15-29 mL/min/1 73 square meters)    Stage 5 End Stage CKD (GFR <15 mL/min/1 73 square meters)  Note: GFR calculation is accurate only with a steady state creatinine    B-type natriuretic peptide [[id number]]  (Normal) Collected:  08/07/19 0834    Lab Status:  Final result Specimen:  Blood from Arm, Right Updated:  08/07/19 0909     NT-proBNP 22 pg/mL     POCT urinalysis dipstick [[id number]]  (Abnormal) Resulted:  08/07/19 0907    Lab Status:  Final result Specimen:  Urine Updated:  08/07/19 0907    ED Urine Macroscopic [[id number]]  (Abnormal) Collected:  08/07/19 0918    Lab Status:  Final result Specimen:  Urine Updated:  08/07/19 0905     Color, UA Yellow     Clarity, UA Clear     pH, UA 5 5     Leukocytes, UA Negative     Nitrite, UA Negative     Protein, UA Trace mg/dl      Glucose, UA Negative mg/dl      Ketones, UA Negative mg/dl      Urobilinogen, UA 0 2 E U /dl      Bilirubin, UA Negative     Blood, UA Negative     Specific Gravity, UA 1 025    Narrative:       CLINITEK RESULT    Troponin I [[id number]]  (Abnormal) Collected:  08/07/19 0834    Lab Status:  Final result Specimen:  Blood from Arm, Right Updated:  08/07/19 0904     Troponin I 4 16 ng/mL     Protime-INR [[id number]]  (Normal) Collected:  08/07/19 0834    Lab Status:  Final result Specimen:  Blood from Arm, Right Updated:  08/07/19 0902     Protime 12 9 seconds      INR 0 96    APTT [[id number]]  (Normal) Collected:  08/07/19 0834    Lab Status:  Final result Specimen:  Blood from Arm, Right Updated:  08/07/19 0902     PTT 31 seconds     CBC and differential [[id number]]  (Abnormal) Collected:  08/07/19 0834    Lab Status:  Final result Specimen:  Blood from Arm, Right Updated:  08/07/19 0842     WBC 4 26 Thousand/uL      RBC 5 22 Million/uL      Hemoglobin 16 3 g/dL      Hematocrit 45 7 %      MCV 88 fL      MCH 31 2 pg      MCHC 35 7 g/dL      RDW 11 7 %      MPV 9 2 fL      Platelets 388 Thousands/uL      nRBC 0 /100 WBCs      Neutrophils Relative 46 %      Immat GRANS % 1 %      Lymphocytes Relative 45 %      Monocytes Relative 6 %      Eosinophils Relative 1 %      Basophils Relative 1 %      Neutrophils Absolute 2 03 Thousands/µL      Immature Grans Absolute 0 02 Thousand/uL      Lymphocytes Absolute 1 91 Thousands/µL      Monocytes Absolute 0 26 Thousand/µL      Eosinophils Absolute 0 02 Thousand/µL      Basophils Absolute 0 02 Thousands/µL                  XR chest 2 views   ED Interpretation by Domi Rodriguez MD (08/07 9610)   I have personally reviewed the x-ray and my findings are: no acute disease  Final Result by Yarelis Murguia MD (08/07 0419)      No acute cardiopulmonary disease  Workstation performed: [id number]                    Procedures  ECG 12 Lead Documentation Only  Date/Time: 8/7/2019 8:17 AM  Performed by: Domi Rodriguez MD  Authorized by: Domi Rodriguez MD     Indications / Diagnosis:  Cp  ECG reviewed by me, the ED Provider: yes    Patient location:  ED  Rate:     ECG rate assessment: bradycardic    Rhythm:     Rhythm: sinus bradycardia    Ectopy:     Ectopy: none    QRS:     QRS axis:  Normal    QRS intervals:  Normal  Conduction:     Conduction: normal    ST segments:     ST segments:  Non-specific (possibly small amount of elevation inferiorly without recipricol changes not meeting STEMI criteria  )  T waves:     T waves: normal    ECG 12 Lead Documentation Only  Date/Time: 8/7/2019 9:32 AM  Performed by: Domi Rodriguez MD  Authorized by: Domi Rodriguez MD     Indications / Diagnosis:  CP reeval trop elevated     ECG reviewed by me, the ED Provider: yes    Patient location:  ED  Interpretation:     Interpretation: normal    Rate:     ECG rate assessment: normal    Rhythm:     Rhythm: sinus rhythm    Ectopy:     Ectopy: none    QRS:     QRS axis:  Normal    QRS intervals:  Normal  Conduction:     Conduction: normal    ST segments: ST segments:  Normal  T waves:     T waves: normal    CriticalCare Time  Performed by: Beni Harrell MD  Authorized by: Beni Harrell MD     Critical care provider statement:     Critical care time (minutes):  45    Critical care time was exclusive of:  Separately billable procedures and treating other patients and teaching time    Critical care was necessary to treat or prevent imminent or life-threatening deterioration of the following conditions: nstemi  Critical care was time spent personally by me on the following activities:  Obtaining history from patient or surrogate, development of treatment plan with patient or surrogate, discussions with consultants, evaluation of patient's response to treatment, examination of patient, review of old charts, re-evaluation of patient's condition, ordering and review of radiographic studies, ordering and review of laboratory studies and ordering and performing treatments and interventions    I assumed direction of critical care for this patient from another provider in my specialty: no             ED Course                               MDM  Number of Diagnoses or Management Options  NSTEMI (non-ST elevated myocardial infarction) [facility]):   Diagnosis management comments: Patient's troponin is elevated  Repeat EKG does not reveal any acute changes  Patient having improved but some continued pain so aspirin nitropaste and heparin were ordered  Cardiology consulted and they will see the patient and he will need to be admitted into the hospital   This was all discussed with the patient  Discussing with Internal Medicine regarding admission         Amount and/or Complexity of Data Reviewed  Clinical lab tests: ordered and reviewed  Tests in the radiology section of CPT®: ordered and reviewed  Tests in the medicine section of CPT®: ordered and reviewed  Discuss the patient with other providers: yes    Patient Progress  Patient progress: stable      Disposition  Final diagnoses:   NSTEMI (non-ST elevated myocardial infarction) [facility])     Time reflects when diagnosis was documented in both MDM as applicable and the Disposition within this note     Time User Action Codes Description Comment    8/7/2019  9:38 AM Natasha Hand Add [I21 4] NSTEMI (non-ST elevated myocardial infarction) [facility])       ED Disposition     None      Follow-up Information    None         Patient's Medications   Discharge Prescriptions    No medications on file     No discharge procedures on file      ED Provider  Electronically Signed by           Michelle Benitez MD  08/07/19 1676

## 2019-08-07 NOTE — H&P
H&P Exam - Gema Malik 26 y o  male MRN: [id number]    Unit/Bed#: ED 15 Encounter: [id number]      Cc: chest pain    History of Present Illness     HPI:  Gema Malik is a 26 y o  male with PMH significant for hyperlipidemia, on diet therapy, and hyperglycemia  He has no family history of coronary disease  Patient presented to the ER for evaluation of chest pain  History is currently taken from patient at the bedside  Patient relates he was in his usual state of health  Yesterday morning approximately 7:30 a m  He was awakened from sleep by chest pain  He indicates that was a severe pain in his central chest region  It did not radiate  He denies any associated shortness of breath, nausea, diaphoresis  He notes the pain was constant and lasted approximately 4 hours and then spontaneously resolved  His blood pressure was elevated so he took 1 of his wife's hydrochlorothiazide  Patient's for the rest of yesterday he did not have any symptoms and felt at his baseline  This morning patient was awake and again at approximately 7:30 a m  He developed the identical episode of chest pain  Was in the substernal region  He notes it became more severe with time so he presented to the ER  Again it had no radiation or associated symptoms  It did not have any exacerbating or alleviating factors  He notes when he arrived in the ER the patient had continue to accelerate and was 10/10 intensity  In the ER he received 325 mg of aspirin, 15 mg of Toradol, 1 nitroglycerin sublingually, a GI cocktail, and nitropaste  One patient's troponin was noted to be positive at 4 he was also started on a heparin infusion ACS protocol    Patient notes chest pain is markedly improved  He notes it is essentially resolved and rates it at a 1/10 intensity  Patient denies any previous episodes of chest pain  His mother and father have hypertension and hyperlipidemia both of them, but no coronary disease    His grandfather had a stroke but no history of heart attack  Patient notes he is very active  Denies any decreased exercise tolerance  He denies any fever or chills  Denies any nausea, vomiting, diarrhea  Denies any shortness of breath or cough  Denies any dizziness or lightheadedness  Historical Information   Past Medical History:   Diagnosis Date    Hyperglycemia     Hyperlipidemia      Patient Active Problem List   Diagnosis    Vitamin D deficiency    Functional murmur    De Quervain's tenosynovitis    Acute pain of left shoulder    Annual physical exam    Mixed hyperlipidemia    Hyperlipidemia    Hyperglycemia    Acute non-ST elevation myocardial infarction (NSTEMI) (HCC)    Chest pain    Stage 2 chronic kidney disease     History reviewed  No pertinent surgical history      Social History   Social History     Substance and Sexual Activity   Alcohol Use Yes    Frequency: Monthly or less     Social History     Substance and Sexual Activity   Drug Use Never     Social History     Tobacco Use   Smoking Status Never Smoker   Smokeless Tobacco Never Used       Family History:   Family History   Problem Relation Age of Onset    Hypertension Mother     Hyperlipidemia Mother     Hypertension Father     Hyperlipidemia Father        Meds/Allergies       Current Facility-Administered Medications:     heparin (porcine) 25,000 units in 250 mL infusion (premix), 3-20 Units/kg/hr (Order-Specific), Intravenous, Titrated, Chilango Douglas MD, Last Rate: 9 mL/hr at 08/07/19 0952, 12 Units/kg/hr at 08/07/19 0952    heparin (porcine) injection 2,000 Units, 2,000 Units, Intravenous, PRN, Chilango Douglas MD    heparin (porcine) injection 4,000 Units, 4,000 Units, Intravenous, PRN, Chilango Douglas MD    sodium chloride 0 9 % infusion, 75 mL/hr, Intravenous, Continuous, Emily Rivas PA-C, Last Rate: 75 mL/hr at 08/07/19 1037, 75 mL/hr at 08/07/19 1037    Current Outpatient Medications:     meloxicam (MOBIC) 15 mg tablet, Take 15 mg by mouth daily, Disp: , Rfl:     Allergies   Allergen Reactions    No Known Allergies        Review of Systems  A detailed 12 point review of systems was conducted and is negative apart from those mentioned in the HPI  Objective   Vitals: Blood pressure 132/79, pulse 68, temperature 98 2 °F (36 8 °C), temperature source Oral, resp  rate 18, weight 77 9 kg (171 lb 11 8 oz), SpO2 100 %  Physical Exam   General:  Pleasant male  No acute distress  Not tachypneic  Smiling and joking  HEENT: EOMI, sclera anicteric, dry mucous membranes  Neck: supple  Heart: Regular rate and rhythm, S1S2 present  No murmur, rub or gallop  Lungs; Clear to auscultation bilaterally  No wheezing, crackles or rhonchi  No accessory muscle use or respiratory distress  Abdomen: soft, non-tender, non-distended, NABS  No guarding or rebound  No peritoneal sound or mass  Extremities: no clubbing, cyanosis, or edema  2+ pedal pulses bilaterally  Neurologic:  Awake and alert  Fluent speech  Skin: warm and dry  No petechiae, purpura or rash  Lab Results:   Results from last 7 days   Lab Units 08/07/19  0834   WBC Thousand/uL 4 26*   HEMOGLOBIN g/dL 16 3   HEMATOCRIT % 45 7   PLATELETS Thousands/uL 275     Results from last 7 days   Lab Units 08/07/19  0834   POTASSIUM mmol/L 3 9   CHLORIDE mmol/L 101   CO2 mmol/L 30   BUN mg/dL 14   CREATININE mg/dL 1 44*   CALCIUM mg/dL 9 7         Imaging:  Chest x-ray:  No acute disease    Urinalysis:  Unremarkable  Urine drug screen:  Negative    EKG:  Normal sinus rhythm  T-wave inversion in lead 3  No ST elevation    Assessment/Plan     1-chest pain secondary to acute non STEMI type 1:  Patient presented with substernal chest pain, without associated symptoms, trigger, exacerbating or alleviating factors or radiation, for 2 days in a row  His EKG does not have any acute ischemic changes however his troponin is 4    His presentation is quite concerning for an acute non STEMI/acute coronary syndrome   -patient is essentially pain free after aspirin, nitrates, and being started on heparin infusion   -he has already been seen by the Cardiology consultation at the bedside:  Cardiac catheterization pending  Anticipate Plavix load   -patient is pain free  If his pain were to occur will also start him on a nitroglycerin infusion   -start Lipitor 40 mg daily    2-hyperlipidemia:  Patient's be maintained on diet therapy as an outpatient  His blood work from 07/27 was reviewed with a total cholesterol of 238, triglycerides 306, HDL 35,     3-hyperglycemia:  Patient noted to have mild hyperglycemia the office  Check A1c    4-probable chronic kidney disease stage 2:  Patient's baseline creatinine appears to range 1 3 on his blood work from last month  Current creatinine is 1 4  Suspect he has underlying chronic kidney disease stage 2     5-family:  Called pt's wife Juwan Hendrickson ([phone]), with pts permission and LM to call my cell number for update     Discussed with patient's nurse at the bedside  Discussed with Cardiology team: Moses Olguin PA-C  Discussed with patient's ER physician Dr Mita Anderson      Disposition:  Due to patient's acute non STEMI, and the need for further workup with cardiac catheterization patient be admitted to the hospital   He is currently on IV heparin infusion  It is anticipated this length of stay will be 2 midnights and be placed on inpatient status  Code Status: Full            Portions of the record may have been created with voice recognition software

## 2019-08-07 NOTE — CONSULTS
Consult - Cardiology   [facility] 26 y o  male MRN: [id number]  Unit/Bed#: ED 15 Encounter: [id number]        Reason For Consult:  Chest pain/ elevated troponin                ASSESSMENT:  1  Chest pain/ NSTEMI   -type 1 vs type 2   -EKGs personally reviewed, no STEMI, nonspecific ST changes   -no reciprocal changes   -initial troponin 4 16    2  Dyslipidemia:   -cholesterol 238, triglycerides 306, HDL 35,     3  Essential hypertension   -BP elevated at home 160/ 110's (please see HPI)   -currently trending 140/90    4  Elevated serum creatinine, 1 44, baseline unclear    PLAN:     1  Chest pain/ NSTEMI:   -will arrange for cardiac catheterization today   -stat echo performed in ED   -keep NPO   -start gentle IV fluids   -full-dose aspirin given this a m , load 600 Plavix     -continue heparin drip and nitroglycerin patch    2  Dyslipidemia:   -will initiate statin therapy post catheterization    3  Essential hypertension:   -continue to monitor   -will add medical therapy pending results of catheterization    4  Elevated serum creatinine:   -BMP tomorrow   -continue fluids after catheterization      History Of Present Illness: This is a 54-year-old gentleman without much of significant past medical history  He in fact denies any past medical history to me  He has no cardiac history and has never seen a cardiologist   He does not have a significant family history of cardiac disease though he notes his grandfather did have a heart attack in his later years  Reviewing his recent lab work he does have dyslipidemia and renal insufficiency  He regularly follows with primary care LACY Andrew) with his last office visit being 07/30/2019  Takes no home medications  He does not abuse tobacco, street drugs, or drink alcohol in excess    He was formally employed as a  but has been out of work for the last 5 months secondary to a surgical procedure in his wrist     Yesterday morning August 6, 2019 this gentleman awoke around 7:30 a m  with sharp substernal chest pain  This radiated to his bilateral shoulders  It was a 10/10  He checked his blood pressure noticed that is in the 160s/110s  At this point he took 1 of his wife's blood pressure pills  He is unsure of the exact medication  He states after this his pain gradually decreased on its own  He had no further chest pain that day  Again he awoke this morning around 7:30 a m  With the same chest pain symptoms  He took his blood pressure again and noticed that it was elevated to the same level  Instead of taking 1 of his wife's blood pressure pills he drove himself to the emergency department for evaluation  Upon arrival his troponin was elevated to 4  He was started on heparin drip and nitroglycerin patch with almost complete resolution of his symptoms  Past Medical History:        History reviewed  No pertinent past medical history  History reviewed  No pertinent surgical history  Allergy:        Allergies   Allergen Reactions    No Known Allergies        Medications:       Prior to Admission medications    Medication Sig Start Date End Date Taking?  Authorizing Provider   meloxicam (MOBIC) 15 mg tablet Take 15 mg by mouth daily   Yes Historical Provider, MD       Family History:     Family History   Problem Relation Age of Onset   [facility] Hypertension Mother     Hyperlipidemia Mother     Hypertension Father     Hyperlipidemia Father         Social History:       Social History     Socioeconomic History    Marital status: /Civil Union     Spouse name: None    Number of children: None    Years of education: None    Highest education level: None   Occupational History    None   Social Needs    Financial resource strain: None    Food insecurity:     Worry: None     Inability: None    Transportation needs:     Medical: None     Non-medical: None   Tobacco Use    Smoking status: Never Smoker    Smokeless tobacco: Never Used   Substance and Sexual Activity    Alcohol use: Yes     Frequency: Monthly or less    Drug use: Never    Sexual activity: Yes     Partners: Female   Lifestyle    Physical activity:     Days per week: None     Minutes per session: None    Stress: None   Relationships    Social connections:     Talks on phone: None     Gets together: None     Attends Orthodox service: None     Active member of club or organization: None     Attends meetings of clubs or organizations: None     Relationship status: None    Intimate partner violence:     Fear of current or ex partner: None     Emotionally abused: None     Physically abused: None     Forced sexual activity: None   Other Topics Concern    None   Social History Narrative    None       ROS:  Symptoms per HPI  Remainder review of systems is negative    Exam:  General:  alert, oriented and in no distress, cooperative  Head: Normocephalic, atraumatic  Eyes:  EOMI  Pupils - equal, round, reactive to accomodation  No icterus  Normal Conjunctiva  Oropharynx: moist and normal-appearing mucosa  Neck: supple, symmetrical, trachea midline and no JVD  Heart:  RRR, No: murmer, rub or gallop, S1 & S2 normal   Respiratory effort / Chest Inspection: unlabored  Lungs:  normal air entry, lungs clear to auscultation and no rales, rhonchi or wheezing   Abdomen: flat, normal findings: bowel sounds normal and soft, non-tender  Lower Limbs:  no pitting edema  Pulses[de-identified]  RLE - DP: present                  LLE - DP: present   Musculoskeletal: ROM grossly normal        DATA:      ECG:    Normal sinus rhythm  Non-specific ST changes  No STEMI                   Telemetry: Normal sinus rhythm,   HR 60's          Echocardiogram:  Pending        Ischemic Testing:  Pending         Weights: Wt Readings from Last 3 Encounters:   08/07/19 77 9 kg (171 lb 11 8 oz)   07/30/19 79 8 kg (176 lb)   07/03/19 78 9 kg (174 lb)   , Body mass index is 26 9 kg/m²           Lab Studies:    Results from last 7 days   Lab Units 08/07/19  0834   CK TOTAL U/L 203   TROPONIN I ng/mL 4 16*   CK MB INDEX % 2 2          Results from last 7 days   Lab Units 08/07/19  0834   WBC Thousand/uL 4 26*   HEMOGLOBIN g/dL 16 3   HEMATOCRIT % 45 7   PLATELETS Thousands/uL 275   ,   Results from last 7 days   Lab Units 08/07/19  0834   POTASSIUM mmol/L 3 9   CHLORIDE mmol/L 101   CO2 mmol/L 30   BUN mg/dL 14   CREATININE mg/dL 1 44*   CALCIUM mg/dL 9 7

## 2019-08-07 NOTE — PLAN OF CARE
Problem: CARDIOVASCULAR - ADULT  Goal: Maintains optimal cardiac output and hemodynamic stability  Description  INTERVENTIONS:  - Monitor I/O, vital signs and rhythm  - Monitor for S/S and trends of decreased cardiac output i e  bleeding, hypotension  - Administer and titrate ordered vasoactive medications to optimize hemodynamic stability  - Assess quality of pulses, skin color and temperature  - Assess for signs of decreased coronary artery perfusion - ex   Angina  - Instruct patient to report change in severity of symptoms  Outcome: Progressing  Goal: Absence of cardiac dysrhythmias or at baseline rhythm  Description  INTERVENTIONS:  - Continuous cardiac monitoring, monitor vital signs, obtain 12 lead EKG if indicated  - Administer antiarrhythmic and heart rate control medications as ordered  - Monitor electrolytes and administer replacement therapy as ordered  Outcome: Progressing     Problem: METABOLIC, FLUID AND ELECTROLYTES - ADULT  Goal: Electrolytes maintained within normal limits  Description  INTERVENTIONS:  - Monitor labs and assess patient for signs and symptoms of electrolyte imbalances  - Administer electrolyte replacement as ordered  - Monitor response to electrolyte replacements, including repeat lab results as appropriate  - Instruct patient on fluid and nutrition as appropriate  Outcome: Progressing  Goal: Fluid balance maintained  Description  INTERVENTIONS:  - Monitor labs and assess for signs and symptoms of volume excess or deficit  - Monitor I/O and WT  - Instruct patient on fluid and nutrition as appropriate  Outcome: Progressing     Problem: HEMATOLOGIC - ADULT  Goal: Maintains hematologic stability  Description  INTERVENTIONS  - Assess for signs and symptoms of bleeding or hemorrhage  - Monitor labs  - Administer supportive blood products/factors as ordered and appropriate  Outcome: Progressing     Problem: PAIN - ADULT  Goal: Verbalizes/displays adequate comfort level or baseline comfort level  Description  Interventions:  - Encourage patient to monitor pain and request assistance  - Assess pain using appropriate pain scale  - Administer analgesics based on type and severity of pain and evaluate response  - Implement non-pharmacological measures as appropriate and evaluate response  - Consider cultural and social influences on pain and pain management  - Notify physician/advanced practitioner if interventions unsuccessful or patient reports new pain  Outcome: Progressing     Problem: SAFETY ADULT  Goal: Patient will remain free of falls  Description  INTERVENTIONS:  - Assess patient frequently for physical needs  -  Identify cognitive and physical deficits and behaviors that affect risk of falls    -  [place] fall precautions as indicated by assessment   - Educate patient/family on patient safety including physical limitations  - Instruct patient to call for assistance with activity based on assessment  - Modify environment to reduce risk of injury  - Consider OT/PT consult to assist with strengthening/mobility  Outcome: Progressing     Problem: DISCHARGE PLANNING  Goal: Discharge to home or other facility with appropriate resources  Description  INTERVENTIONS:  - Identify barriers to discharge w/patient and caregiver  - Arrange for needed discharge resources and transportation as appropriate  - Identify discharge learning needs (meds, wound care, etc )  - Arrange for interpretive services to assist at discharge as needed  - Refer to Case Management Department for coordinating discharge planning if the patient needs post-hospital services based on physician/advanced practitioner order or complex needs related to functional status, cognitive ability, or social support system  Outcome: Progressing     Problem: Knowledge Deficit  Goal: Patient/family/caregiver demonstrates understanding of disease process, treatment plan, medications, and discharge instructions  Description  Complete learning assessment and assess knowledge base    Interventions:  - Provide teaching at level of understanding  - Provide teaching via preferred learning methods  Outcome: Progressing     Problem: DISCHARGE PLANNING - CARE MANAGEMENT  Goal: Discharge to post-acute care or home with appropriate resources  Description  INTERVENTIONS:  - Conduct assessment to determine patient/family and health care team treatment goals, and need for post-acute services based on payer coverage, community resources, and patient preferences, and barriers to discharge  - Address psychosocial, clinical, and financial barriers to discharge as identified in assessment in conjunction with the patient/family and health care team  - Arrange appropriate level of post-acute services according to patients   needs and preference and payer coverage in collaboration with the physician and health care team  - Communicate with and update the patient/family, physician, and health care team regarding progress on the discharge plan  - Arrange appropriate transportation to post-acute venues  Outcome: Progressing

## 2019-08-07 NOTE — ED NOTES
Per cath lab pt not scheduled for 1100  Pt to be transferred up to E4 at this time        Kade Hoang RN  08/07/19 4749

## 2019-08-08 ENCOUNTER — APPOINTMENT (INPATIENT)
Dept: NON INVASIVE DIAGNOSTICS | Facility: HOSPITAL | Age: 29
DRG: 287 | End: 2019-08-08
Payer: COMMERCIAL

## 2019-08-08 VITALS
TEMPERATURE: 98.1 F | BODY MASS INDEX: 26.16 KG/M2 | OXYGEN SATURATION: 98 % | DIASTOLIC BLOOD PRESSURE: 87 MMHG | WEIGHT: 166.67 LBS | SYSTOLIC BLOOD PRESSURE: 127 MMHG | HEART RATE: 67 BPM | RESPIRATION RATE: 18 BRPM | HEIGHT: 67 IN

## 2019-08-08 PROBLEM — R79.89 TROPONIN I ABOVE REFERENCE RANGE: Status: ACTIVE | Noted: 2019-08-08

## 2019-08-08 PROBLEM — R77.8 TROPONIN I ABOVE REFERENCE RANGE: Status: ACTIVE | Noted: 2019-08-08

## 2019-08-08 PROBLEM — I20.1 CORONARY VASOSPASM (HCC): Status: ACTIVE | Noted: 2019-08-07

## 2019-08-08 LAB
ANION GAP SERPL CALCULATED.3IONS-SCNC: 5 MMOL/L (ref 4–13)
APTT PPP: 79 SECONDS (ref 23–37)
ATRIAL RATE: 59 BPM
BUN SERPL-MCNC: 14 MG/DL (ref 5–25)
CALCIUM SERPL-MCNC: 8.8 MG/DL (ref 8.3–10.1)
CHLORIDE SERPL-SCNC: 104 MMOL/L (ref 100–108)
CO2 SERPL-SCNC: 30 MMOL/L (ref 21–32)
CREAT SERPL-MCNC: 1.22 MG/DL (ref 0.6–1.3)
GFR SERPL CREATININE-BSD FRML MDRD: 79 ML/MIN/1.73SQ M
GLUCOSE SERPL-MCNC: 111 MG/DL (ref 65–140)
P AXIS: 54 DEGREES
POTASSIUM SERPL-SCNC: 3.9 MMOL/L (ref 3.5–5.3)
PR INTERVAL: 158 MS
QRS AXIS: 62 DEGREES
QRSD INTERVAL: 84 MS
QT INTERVAL: 380 MS
QTC INTERVAL: 376 MS
SODIUM SERPL-SCNC: 139 MMOL/L (ref 136–145)
T WAVE AXIS: 40 DEGREES
VENTRICULAR RATE: 59 BPM

## 2019-08-08 PROCEDURE — 93005 ELECTROCARDIOGRAM TRACING: CPT

## 2019-08-08 PROCEDURE — C1894 INTRO/SHEATH, NON-LASER: HCPCS | Performed by: PHYSICIAN ASSISTANT

## 2019-08-08 PROCEDURE — 99152 MOD SED SAME PHYS/QHP 5/>YRS: CPT | Performed by: INTERNAL MEDICINE

## 2019-08-08 PROCEDURE — 99152 MOD SED SAME PHYS/QHP 5/>YRS: CPT | Performed by: PHYSICIAN ASSISTANT

## 2019-08-08 PROCEDURE — C1769 GUIDE WIRE: HCPCS | Performed by: PHYSICIAN ASSISTANT

## 2019-08-08 PROCEDURE — B2111ZZ FLUOROSCOPY OF MULTIPLE CORONARY ARTERIES USING LOW OSMOLAR CONTRAST: ICD-10-PCS | Performed by: INTERNAL MEDICINE

## 2019-08-08 PROCEDURE — 93010 ELECTROCARDIOGRAM REPORT: CPT | Performed by: INTERNAL MEDICINE

## 2019-08-08 PROCEDURE — 99239 HOSP IP/OBS DSCHRG MGMT >30: CPT | Performed by: INTERNAL MEDICINE

## 2019-08-08 PROCEDURE — 80048 BASIC METABOLIC PNL TOTAL CA: CPT | Performed by: INTERNAL MEDICINE

## 2019-08-08 PROCEDURE — 93458 L HRT ARTERY/VENTRICLE ANGIO: CPT | Performed by: PHYSICIAN ASSISTANT

## 2019-08-08 PROCEDURE — 4A023N7 MEASUREMENT OF CARDIAC SAMPLING AND PRESSURE, LEFT HEART, PERCUTANEOUS APPROACH: ICD-10-PCS | Performed by: INTERNAL MEDICINE

## 2019-08-08 PROCEDURE — C1887 CATHETER, GUIDING: HCPCS | Performed by: PHYSICIAN ASSISTANT

## 2019-08-08 PROCEDURE — 93458 L HRT ARTERY/VENTRICLE ANGIO: CPT | Performed by: INTERNAL MEDICINE

## 2019-08-08 PROCEDURE — 85730 THROMBOPLASTIN TIME PARTIAL: CPT | Performed by: INTERNAL MEDICINE

## 2019-08-08 RX ORDER — MAGNESIUM HYDROXIDE/ALUMINUM HYDROXICE/SIMETHICONE 120; 1200; 1200 MG/30ML; MG/30ML; MG/30ML
30 SUSPENSION ORAL EVERY 4 HOURS PRN
Status: DISCONTINUED | OUTPATIENT
Start: 2019-08-08 | End: 2019-08-08 | Stop reason: HOSPADM

## 2019-08-08 RX ORDER — HEPARIN SODIUM 1000 [USP'U]/ML
INJECTION, SOLUTION INTRAVENOUS; SUBCUTANEOUS CODE/TRAUMA/SEDATION MEDICATION
Status: COMPLETED | OUTPATIENT
Start: 2019-08-08 | End: 2019-08-08

## 2019-08-08 RX ORDER — MIDAZOLAM HYDROCHLORIDE 1 MG/ML
INJECTION INTRAMUSCULAR; INTRAVENOUS CODE/TRAUMA/SEDATION MEDICATION
Status: COMPLETED | OUTPATIENT
Start: 2019-08-08 | End: 2019-08-08

## 2019-08-08 RX ORDER — LIDOCAINE HYDROCHLORIDE 10 MG/ML
INJECTION, SOLUTION INFILTRATION; PERINEURAL CODE/TRAUMA/SEDATION MEDICATION
Status: COMPLETED | OUTPATIENT
Start: 2019-08-08 | End: 2019-08-08

## 2019-08-08 RX ORDER — SODIUM CHLORIDE 9 MG/ML
75 INJECTION, SOLUTION INTRAVENOUS CONTINUOUS
Status: DISPENSED | OUTPATIENT
Start: 2019-08-08 | End: 2019-08-08

## 2019-08-08 RX ORDER — ONDANSETRON 2 MG/ML
4 INJECTION INTRAMUSCULAR; INTRAVENOUS EVERY 6 HOURS PRN
Status: DISCONTINUED | OUTPATIENT
Start: 2019-08-08 | End: 2019-08-08 | Stop reason: HOSPADM

## 2019-08-08 RX ORDER — FENTANYL CITRATE 50 UG/ML
INJECTION, SOLUTION INTRAMUSCULAR; INTRAVENOUS CODE/TRAUMA/SEDATION MEDICATION
Status: COMPLETED | OUTPATIENT
Start: 2019-08-08 | End: 2019-08-08

## 2019-08-08 RX ORDER — DILTIAZEM HYDROCHLORIDE 180 MG/1
180 CAPSULE, COATED, EXTENDED RELEASE ORAL DAILY
Qty: 30 CAPSULE | Refills: 0 | Status: CANCELLED | OUTPATIENT
Start: 2019-08-08

## 2019-08-08 RX ORDER — VERAPAMIL HYDROCHLORIDE 2.5 MG/ML
INJECTION, SOLUTION INTRAVENOUS CODE/TRAUMA/SEDATION MEDICATION
Status: COMPLETED | OUTPATIENT
Start: 2019-08-08 | End: 2019-08-08

## 2019-08-08 RX ORDER — DILTIAZEM HYDROCHLORIDE 240 MG/1
240 CAPSULE, COATED, EXTENDED RELEASE ORAL DAILY
Qty: 30 CAPSULE | Refills: 0 | Status: SHIPPED | OUTPATIENT
Start: 2019-08-08 | End: 2019-09-20 | Stop reason: SDUPTHER

## 2019-08-08 RX ORDER — NITROGLYCERIN 0.4 MG/1
0.4 TABLET SUBLINGUAL
Qty: 30 TABLET | Refills: 0 | Status: SHIPPED | OUTPATIENT
Start: 2019-08-08 | End: 2020-09-28

## 2019-08-08 RX ORDER — NITROGLYCERIN 20 MG/100ML
INJECTION INTRAVENOUS CODE/TRAUMA/SEDATION MEDICATION
Status: COMPLETED | OUTPATIENT
Start: 2019-08-08 | End: 2019-08-08

## 2019-08-08 RX ADMIN — FENTANYL CITRATE 50 MCG: 50 INJECTION, SOLUTION INTRAMUSCULAR; INTRAVENOUS at 10:26

## 2019-08-08 RX ADMIN — IOHEXOL 65 ML: 350 INJECTION, SOLUTION INTRAVENOUS at 10:38

## 2019-08-08 RX ADMIN — HEPARIN SODIUM 4000 UNITS: 1000 INJECTION INTRAVENOUS; SUBCUTANEOUS at 10:29

## 2019-08-08 RX ADMIN — SODIUM CHLORIDE 75 ML/HR: 0.9 INJECTION, SOLUTION INTRAVENOUS at 00:11

## 2019-08-08 RX ADMIN — VERAPAMIL HYDROCHLORIDE 2.5 MG: 2.5 INJECTION INTRAVENOUS at 10:29

## 2019-08-08 RX ADMIN — FENTANYL CITRATE 50 MCG: 50 INJECTION, SOLUTION INTRAMUSCULAR; INTRAVENOUS at 10:23

## 2019-08-08 RX ADMIN — NITROGLYCERIN 0.4 MG: 0.4 TABLET SUBLINGUAL at 04:25

## 2019-08-08 RX ADMIN — MIDAZOLAM 2 MG: 1 INJECTION INTRAMUSCULAR; INTRAVENOUS at 10:26

## 2019-08-08 RX ADMIN — NITROGLYCERIN 200 MCG: 20 INJECTION INTRAVENOUS at 10:35

## 2019-08-08 RX ADMIN — MORPHINE SULFATE 2 MG: 2 INJECTION, SOLUTION INTRAMUSCULAR; INTRAVENOUS at 04:38

## 2019-08-08 RX ADMIN — ASPIRIN 81 MG: 81 TABLET, COATED ORAL at 09:03

## 2019-08-08 RX ADMIN — NITROGLYCERIN 200 MCG: 20 INJECTION INTRAVENOUS at 10:29

## 2019-08-08 RX ADMIN — LIDOCAINE HYDROCHLORIDE 1 ML: 10 INJECTION, SOLUTION INFILTRATION; PERINEURAL at 10:27

## 2019-08-08 RX ADMIN — ALUMINUM HYDROXIDE, MAGNESIUM HYDROXIDE, AND SIMETHICONE 30 ML: 200; 200; 20 SUSPENSION ORAL at 04:44

## 2019-08-08 RX ADMIN — MIDAZOLAM 2 MG: 1 INJECTION INTRAMUSCULAR; INTRAVENOUS at 10:23

## 2019-08-08 RX ADMIN — METOPROLOL TARTRATE 12.5 MG: 25 TABLET ORAL at 09:02

## 2019-08-08 NOTE — NURSING NOTE
RN educated pt on post cath care  Pt educated on his new medication, dose, time and s/s  RN discussed and reviewed f/u instructions with pt  Pts wife and mother present at bedside  Pt educated no driving for 24 hours  Pt educated on no lifting 10lbs for the next 24 hours

## 2019-08-08 NOTE — UTILIZATION REVIEW
Initial Clinical Review    Admission: Date/Time/Statement: 8/7/19 @ 1003     Orders Placed This Encounter   Procedures    Inpatient Admission     Standing Status:   Standing     Number of Occurrences:   1     Order Specific Question:   Admitting Physician     Answer:   Isaiah Torres [[id number]]     Order Specific Question:   Level of Care     Answer:   Med Surg [[id number]]     Order Specific Question:   Estimated length of stay     Answer:   More than 2 Midnights     Order Specific Question:   Certification     Answer:   I certify that inpatient services are medically necessary for this patient for a duration of greater than two midnights  See H&P and MD Progress Notes for additional information about the patient's course of treatment  ED Arrival Information     Expected Arrival Acuity Means of Arrival Escorted By Service Admission Type    - 8/7/2019 07:52 Urgent Walk-In Self General Medicine Urgent    Arrival Complaint    chest pain        Chief Complaint   Patient presents with    Chest Pain     Chest pain that started yesterday - reports radiation to b/l shoulders  Denies SOB, headache, dizziness  Assessment/Plan:  44 yo male with PMH HLD & Hyperglycemia presented to ED from home with for eval of Chest pain  He was awakened yesterday am with CP - severe in central chest region that lasted 4 hrs then resolved  His BP was elevated so he took 1 of his wife's HCTZ  This am he was awakened again with identical CP - substernal and more severe so presented to ED  Pain continued to accelerate and was a 10/10 in the ED  He received ASA, IV Toradol, 1 sl NTG,  a GI cocktail, and nitropaste  Started on a heparin infusion per ACS protocol w/ Trop 4  Admitted to IP with Acute NSTEMI  type 1 vs 2  W/U for ACS  He was seen by Cardio and plan for Cardiac Cath, Suspect CKD 2 - baseline cr 1 3 and current 1 4  IVF's, Plavix load, Heparin drip  8/8 Episode of CP 6/10, burning   Rec'd NTG sl x 1 and MS IV     Cardiac Cath: There is no significant coronary artery disease  Elev trop not from type 1  Management should include continuation of medical therapy and aggressive risk factor modification      ED Triage Vitals   Temperature Pulse Respirations Blood Pressure SpO2   08/07/19 0803 08/07/19 0802 08/07/19 0802 08/07/19 0802 08/07/19 0802   98 2 °F (36 8 °C) (!) 53 18 165/100 100 %      Temp Source Heart Rate Source Patient Position - Orthostatic VS BP Location FiO2 (%)   08/07/19 0803 08/07/19 0802 08/07/19 0802 08/07/19 0802 --   Oral Monitor Lying Right arm       Pain Score       08/07/19 0830       9        Wt Readings from Last 1 Encounters:   08/07/19 75 6 kg (166 lb 10 7 oz)     Additional Vital Signs:   08/08/19 0740 98 1 °F (36 7 °C) 62 18  127/75 98 % None (Room air) Lying   08/07/19 2318 97 5 °F (36 4 °C) 68 17  134/85 97 % None (Room air) Sitting   08/07/19 1518 97 7 °F (36 5 °C) 61 18  131/87 99 % None (Room air) Lying   08/07/19 1100  70 20  145/91 99 % None (Room air) Lying   08/07/19 0912  60 20  148/96 97 % None (Room air)        Pertinent Labs/Diagnostic Test Results:   Results from last 7 days   Lab Units 08/07/19  0834   WBC Thousand/uL 4 26*   HEMOGLOBIN g/dL 16 3   HEMATOCRIT % 45 7   PLATELETS Thousands/uL 275   NEUTROS ABS Thousands/µL 2 03     Results from last 7 days   Lab Units 08/08/19  0407 08/07/19  0834   SODIUM mmol/L 139 139   POTASSIUM mmol/L 3 9 3 9   CHLORIDE mmol/L 104 101   CO2 mmol/L 30 30   ANION GAP mmol/L 5 8   BUN mg/dL 14 14   CREATININE mg/dL 1 22 1 44*   EGFR ml/min/1 73sq m 79 65   CALCIUM mg/dL 8 8 9 7     Results from last 7 days   Lab Units 08/08/19  0407 08/07/19  0834   GLUCOSE RANDOM mg/dL 111 120     Results from last 7 days   Lab Units 08/07/19  0834   CK TOTAL U/L 203   CK MB INDEX % 2 2   CK MB ng/mL 4 4     Results from last 7 days   Lab Units 08/07/19  1413 08/07/19  1132 08/07/19  0834   TROPONIN I ng/mL 5 65* 6 18* 4 16*     Results from last 7 days   Lab Units 08/08/19  0331 08/07/19 2014 08/07/19  1413 08/07/19  0834   PROTIME seconds  --   --   --  12 9   INR   --   --   --  0 96   PTT seconds 79* 53* 70* 31     Results from last 7 days   Lab Units 08/07/19  0834   NT-PRO BNP pg/mL 22     Results from last 7 days   Lab Units 08/07/19  0918   CLARITY UA  Clear   COLOR UA  Yellow   SPEC GRAV UA  1 025   PH UA  5 5   GLUCOSE UA mg/dl Negative   KETONES UA mg/dl Negative   BLOOD UA  Negative   PROTEIN UA mg/dl Trace*   NITRITE UA  Negative   BILIRUBIN UA  Negative   UROBILINOGEN UA E U /dl 0 2   LEUKOCYTES UA  Negative   WBC UA /hpf None Seen   RBC UA /hpf None Seen   BACTERIA UA /hpf Occasional   EPITHELIAL CELLS WET PREP /hpf Occasional     Results from last 7 days   Lab Units 08/07/19  0907   AMPH/METH  Negative   BARBITURATE UR  Negative   BENZODIAZEPINE UR  Negative   COCAINE UR  Negative   METHADONE URINE  Negative   OPIATE UR  Negative   PCP UR  Negative   THC UR  Negative     8/7 CXR: No acute cardiopulmonary disease  8/7 EKG: Normal sinus rhythm  T-wave inversion in lead 3    No ST elevation    ED Treatment:   Medication Administration from 08/07/2019 0751 to 08/07/2019 1154       Date/Time Order Dose Route Action     08/07/2019 0830 ketorolac (TORADOL) injection 15 mg 15 mg Intravenous Given     08/07/2019 0833 aluminum-magnesium hydroxide-simethicone (MYLANTA) 200-200-20 mg/5 mL oral suspension 30 mL 30 mL Oral Given     08/07/2019 0834 Lidocaine Viscous HCl (XYLOCAINE) 2 % mucosal solution 10 mL 10 mL Swish & Swallow Given     08/07/2019 0931 aspirin tablet 325 mg 325 mg Oral Given     08/07/2019 0933 nitroglycerin (NITRO-BID) 2 % TD ointment 1 inch 1 inch Topical Given     08/07/2019 0948 heparin (porcine) injection 4,000 Units 4,000 Units Intravenous Given     08/07/2019 0952 heparin (porcine) 25,000 units in 250 mL infusion (premix) 12 Units/kg/hr Intravenous New Bag     08/07/2019 0946 nitroglycerin (NITROSTAT) SL tablet 0 4 mg 0 4 mg Sublingual Given 08/07/2019 1037 sodium chloride 0 9 % infusion 75 mL/hr Intravenous New Bag     08/07/2019 1056 clopidogrel (PLAVIX) tablet 600 mg 600 mg Oral Given        Past Medical History:   Diagnosis Date    Hyperglycemia     Hyperlipidemia      Present on Admission:   Acute non-ST elevation myocardial infarction (NSTEMI) ([facility] [address] )   Chest pain   Hyperglycemia   Hyperlipidemia   Stage 2 chronic kidney disease      Admitting Diagnosis: Chest pain [R07 9]  Hypertension [I10]  Elevated glucose [R73 09]  Renal insufficiency [N28 9]  NSTEMI (non-ST elevated myocardial infarction) ([facility] [address] ) [I21 4]    Age/Sex: 26 y o  male  Admission Orders:  Current Facility-Administered Medications:  aluminum-magnesium hydroxide-simethicone 30 mL Oral Q4H PRN    aspirin 81 mg Oral Daily    atorvastatin 80 mg Oral Daily With Dinner    heparin (porcine) 3-20 Units/kg/hr (Order-Specific Intravenous Titrated    heparin (porcine) 2,000 Units Intravenous PRN    heparin (porcine) 4,000 Units Intravenous PRN    metoprolol tartrate 12 5 mg Oral Q12H [facility]    morphine injection 2 mg Intravenous Q4H PRN  IV X 1 8/8   nitroglycerin 0 4 mg Sublingual Q5 Min PRN  x 1 8/8   ondansetron 4 mg Intravenous Q6H PRN    sodium chloride 75 mL/hr Intravenous Continuous      TELE  ECHO  SCD's  IP CONSULT TO CARDIOLOGY    Network Utilization Review Department  Phone: [phone]; Fax [phone]  [email or web address]  ATTENTION: Please call with any questions or concerns to [phone]  and carefully listen to the prompts so that you are directed to the right person  Send all requests for admission clinical reviews, approved or denied determinations and any other requests to fax [phone]   All voicemails are confidential

## 2019-08-08 NOTE — PROGRESS NOTES
Hemoband removed, pt instructed to keep arm still for 2 more hours d/t his coagulation  Pt is anxious and was educated on the post cath care  Pt's wife at bedside, ordred pts lunch  Pt's mom at bedside as well  Dr Lizbeth Alaniz spoke with pt    Call bell in hand  [facility]

## 2019-08-08 NOTE — QUICK NOTE
Patient seen post cath  No epicardial CAD  RCA noted to spasm during cath  Responded to IC Nitro    CP/NSTEMI possibly from coronary vasospasm  Would start Diltiazem 240mg daily  He should go home with RX for sublingual nitro as well for acute chest pain episodes  Instructed pt on how to use  Follow up in office in 4 weeks

## 2019-08-08 NOTE — NURSING NOTE
Patient reports to PCA that he is having chest pain  Assess patient, vitals stable, EKG done, denies SOB or radiation to shoulder/ arms  Patient describes burning pain of 6/10 on pain scale  SLIM notified  Will continue to monitor

## 2019-08-08 NOTE — PLAN OF CARE
Problem: CARDIOVASCULAR - ADULT  Goal: Maintains optimal cardiac output and hemodynamic stability  Description  INTERVENTIONS:  - Monitor I/O, vital signs and rhythm  - Monitor for S/S and trends of decreased cardiac output i e  bleeding, hypotension  - Administer and titrate ordered vasoactive medications to optimize hemodynamic stability  - Assess quality of pulses, skin color and temperature  - Assess for signs of decreased coronary artery perfusion - ex   Angina  - Instruct patient to report change in severity of symptoms  8/8/2019 1448 by Ema Mercado RN  Outcome: Adequate for Discharge  8/8/2019 1448 by Ema Mercado RN  Outcome: Progressing  8/8/2019 0722 by Ema Mercado RN  Outcome: Progressing  8/8/2019 0721 by Ema Mercado RN  Outcome: Progressing  Goal: Absence of cardiac dysrhythmias or at baseline rhythm  Description  INTERVENTIONS:  - Continuous cardiac monitoring, monitor vital signs, obtain 12 lead EKG if indicated  - Administer antiarrhythmic and heart rate control medications as ordered  - Monitor electrolytes and administer replacement therapy as ordered  8/8/2019 1448 by Ema Mercado RN  Outcome: Adequate for Discharge  8/8/2019 1448 by Ema Mercado RN  Outcome: Progressing  8/8/2019 0722 by Ema Mercado RN  Outcome: Progressing  8/8/2019 0721 by Ema Mercado RN  Outcome: Progressing     Problem: METABOLIC, FLUID AND ELECTROLYTES - ADULT  Goal: Electrolytes maintained within normal limits  Description  INTERVENTIONS:  - Monitor labs and assess patient for signs and symptoms of electrolyte imbalances  - Administer electrolyte replacement as ordered  - Monitor response to electrolyte replacements, including repeat lab results as appropriate  - Instruct patient on fluid and nutrition as appropriate  8/8/2019 1448 by Ema Mercado RN  Outcome: Adequate for Discharge  8/8/2019 1448 by Ema Mercado RN  Outcome: Progressing  8/8/2019 0722 by Ema Mercado RN  Outcome: Progressing  8/8/2019 0721 by Marisol Cheung RN  Outcome: Progressing  Goal: Fluid balance maintained  Description  INTERVENTIONS:  - Monitor labs and assess for signs and symptoms of volume excess or deficit  - Monitor I/O and WT  - Instruct patient on fluid and nutrition as appropriate  8/8/2019 1448 by Marisol Cheung RN  Outcome: Adequate for Discharge  8/8/2019 1448 by Marisol Cheung RN  Outcome: Progressing  8/8/2019 0722 by Marisol Cheung RN  Outcome: Progressing  8/8/2019 0721 by Marisol Cheung RN  Outcome: Progressing     Problem: HEMATOLOGIC - ADULT  Goal: Maintains hematologic stability  Description  INTERVENTIONS  - Assess for signs and symptoms of bleeding or hemorrhage  - Monitor labs  - Administer supportive blood products/factors as ordered and appropriate  8/8/2019 1448 by Marisol Cheung RN  Outcome: Adequate for Discharge  8/8/2019 1448 by Marisol Cheung RN  Outcome: Progressing  8/8/2019 0722 by Marisol Cheung RN  Outcome: Progressing  8/8/2019 0721 by Marisol Cheung RN  Outcome: Progressing     Problem: PAIN - ADULT  Goal: Verbalizes/displays adequate comfort level or baseline comfort level  Description  Interventions:  - Encourage patient to monitor pain and request assistance  - Assess pain using appropriate pain scale  - Administer analgesics based on type and severity of pain and evaluate response  - Implement non-pharmacological measures as appropriate and evaluate response  - Consider cultural and social influences on pain and pain management  - Notify physician/advanced practitioner if interventions unsuccessful or patient reports new pain  8/8/2019 1448 by Marisol Cheung RN  Outcome: Adequate for Discharge  8/8/2019 1448 by Marisol Cheung RN  Outcome: Progressing  8/8/2019 0722 by Marisol Cheung RN  Outcome: Progressing  8/8/2019 0721 by Marisol Cheung RN  Outcome: Progressing     Problem: SAFETY ADULT  Goal: Patient will remain free of falls  Description  INTERVENTIONS:  - Assess patient frequently for physical needs  -  Identify cognitive and physical deficits and behaviors that affect risk of falls  -  [place] fall precautions as indicated by assessment   - Educate patient/family on patient safety including physical limitations  - Instruct patient to call for assistance with activity based on assessment  - Modify environment to reduce risk of injury  - Consider OT/PT consult to assist with strengthening/mobility  8/8/2019 1448 by Afsaneh Ernandez RN  Outcome: Adequate for Discharge  8/8/2019 1448 by Afsaneh Ernandez RN  Outcome: Progressing  8/8/2019 0722 by Afsaneh Ernandez RN  Outcome: Progressing  8/8/2019 0721 by Afsaneh Ernandez RN  Outcome: Progressing     Problem: DISCHARGE PLANNING  Goal: Discharge to home or other facility with appropriate resources  Description  INTERVENTIONS:  - Identify barriers to discharge w/patient and caregiver  - Arrange for needed discharge resources and transportation as appropriate  - Identify discharge learning needs (meds, wound care, etc )  - Arrange for interpretive services to assist at discharge as needed  - Refer to Case Management Department for coordinating discharge planning if the patient needs post-hospital services based on physician/advanced practitioner order or complex needs related to functional status, cognitive ability, or social support system  8/8/2019 1448 by Afsaneh Ernandez RN  Outcome: Adequate for Discharge  8/8/2019 1448 by Afsaneh Ernandez RN  Outcome: Progressing  8/8/2019 0722 by Afsaneh Ernandez RN  Outcome: Progressing  8/8/2019 0721 by Afsaneh Ernandez RN  Outcome: Progressing     Problem: Knowledge Deficit  Goal: Patient/family/caregiver demonstrates understanding of disease process, treatment plan, medications, and discharge instructions  Description  Complete learning assessment and assess knowledge base    Interventions:  - Provide teaching at level of understanding  - Provide teaching via preferred learning methods  8/8/2019 1448 by Tripp Lara RN  Outcome: Adequate for Discharge  8/8/2019 1448 by Tripp Lara RN  Outcome: Progressing  8/8/2019 0722 by Tripp Lara RN  Outcome: Progressing  8/8/2019 0721 by Tripp Lara RN  Outcome: Progressing     Problem: DISCHARGE PLANNING - CARE MANAGEMENT  Goal: Discharge to post-acute care or home with appropriate resources  Description  INTERVENTIONS:  - Conduct assessment to determine patient/family and health care team treatment goals, and need for post-acute services based on payer coverage, community resources, and patient preferences, and barriers to discharge  - Address psychosocial, clinical, and financial barriers to discharge as identified in assessment in conjunction with the patient/family and health care team  - Arrange appropriate level of post-acute services according to patients   needs and preference and payer coverage in collaboration with the physician and health care team  - Communicate with and update the patient/family, physician, and health care team regarding progress on the discharge plan  - Arrange appropriate transportation to post-acute venues  8/8/2019 1448 by Tripp Lara RN  Outcome: Adequate for Discharge  8/8/2019 1448 by Tripp Lara RN  Outcome: Progressing  8/8/2019 0722 by Tripp Lara RN  Outcome: Progressing  8/8/2019 0721 by Tripp Lara RN  Outcome: Progressing

## 2019-08-08 NOTE — DISCHARGE INSTRUCTIONS
Discharge instructions    Please take your medication as directed  You have been started on a pill Cardizem/diltiazem by the cardiologist to help prevent any additional chest pain or spasms of your heart artery  Please take the nitroglycerin, as instructed by the cardiologist, if you experience any chest pain      Please return to the ER with any problems at all, especially any chest pain, difficulty breathing, dizziness, weakness, lightheadedness or any other problems at all

## 2019-08-08 NOTE — PLAN OF CARE
Problem: CARDIOVASCULAR - ADULT  Goal: Maintains optimal cardiac output and hemodynamic stability  Description  INTERVENTIONS:  - Monitor I/O, vital signs and rhythm  - Monitor for S/S and trends of decreased cardiac output i e  bleeding, hypotension  - Administer and titrate ordered vasoactive medications to optimize hemodynamic stability  - Assess quality of pulses, skin color and temperature  - Assess for signs of decreased coronary artery perfusion - ex   Angina  - Instruct patient to report change in severity of symptoms  Outcome: Progressing  Goal: Absence of cardiac dysrhythmias or at baseline rhythm  Description  INTERVENTIONS:  - Continuous cardiac monitoring, monitor vital signs, obtain 12 lead EKG if indicated  - Administer antiarrhythmic and heart rate control medications as ordered  - Monitor electrolytes and administer replacement therapy as ordered  Outcome: Progressing     Problem: METABOLIC, FLUID AND ELECTROLYTES - ADULT  Goal: Electrolytes maintained within normal limits  Description  INTERVENTIONS:  - Monitor labs and assess patient for signs and symptoms of electrolyte imbalances  - Administer electrolyte replacement as ordered  - Monitor response to electrolyte replacements, including repeat lab results as appropriate  - Instruct patient on fluid and nutrition as appropriate  Outcome: Progressing  Goal: Fluid balance maintained  Description  INTERVENTIONS:  - Monitor labs and assess for signs and symptoms of volume excess or deficit  - Monitor I/O and WT  - Instruct patient on fluid and nutrition as appropriate  Outcome: Progressing     Problem: HEMATOLOGIC - ADULT  Goal: Maintains hematologic stability  Description  INTERVENTIONS  - Assess for signs and symptoms of bleeding or hemorrhage  - Monitor labs  - Administer supportive blood products/factors as ordered and appropriate  Outcome: Progressing     Problem: PAIN - ADULT  Goal: Verbalizes/displays adequate comfort level or baseline comfort level  Description  Interventions:  - Encourage patient to monitor pain and request assistance  - Assess pain using appropriate pain scale  - Administer analgesics based on type and severity of pain and evaluate response  - Implement non-pharmacological measures as appropriate and evaluate response  - Consider cultural and social influences on pain and pain management  - Notify physician/advanced practitioner if interventions unsuccessful or patient reports new pain  Outcome: Progressing     Problem: SAFETY ADULT  Goal: Patient will remain free of falls  Description  INTERVENTIONS:  - Assess patient frequently for physical needs  -  Identify cognitive and physical deficits and behaviors that affect risk of falls    -  [place] fall precautions as indicated by assessment   - Educate patient/family on patient safety including physical limitations  - Instruct patient to call for assistance with activity based on assessment  - Modify environment to reduce risk of injury  - Consider OT/PT consult to assist with strengthening/mobility  Outcome: Progressing     Problem: DISCHARGE PLANNING  Goal: Discharge to home or other facility with appropriate resources  Description  INTERVENTIONS:  - Identify barriers to discharge w/patient and caregiver  - Arrange for needed discharge resources and transportation as appropriate  - Identify discharge learning needs (meds, wound care, etc )  - Arrange for interpretive services to assist at discharge as needed  - Refer to Case Management Department for coordinating discharge planning if the patient needs post-hospital services based on physician/advanced practitioner order or complex needs related to functional status, cognitive ability, or social support system  Outcome: Progressing     Problem: Knowledge Deficit  Goal: Patient/family/caregiver demonstrates understanding of disease process, treatment plan, medications, and discharge instructions  Description  Complete learning assessment and assess knowledge base    Interventions:  - Provide teaching at level of understanding  - Provide teaching via preferred learning methods  Outcome: Progressing     Problem: DISCHARGE PLANNING - CARE MANAGEMENT  Goal: Discharge to post-acute care or home with appropriate resources  Description  INTERVENTIONS:  - Conduct assessment to determine patient/family and health care team treatment goals, and need for post-acute services based on payer coverage, community resources, and patient preferences, and barriers to discharge  - Address psychosocial, clinical, and financial barriers to discharge as identified in assessment in conjunction with the patient/family and health care team  - Arrange appropriate level of post-acute services according to patients   needs and preference and payer coverage in collaboration with the physician and health care team  - Communicate with and update the patient/family, physician, and health care team regarding progress on the discharge plan  - Arrange appropriate transportation to post-acute venues  Outcome: Progressing

## 2019-08-08 NOTE — DISCHARGE SUMMARY
Discharge Summary - Medical Iban Anna 26 y o  male MRN: [id number]    [facility] [place] [address] MED SURG Room / Bed: [address] Jose Armando 87 459/E4 -* Encounter: [id number]    BRIEF OVERVIEW      Admission Date: 8/7/2019       Discharge Date:  08/08/2019    Primary Diagnoses  Principal Problem:    Acute non-ST elevation myocardial infarction (NSTEMI) [facility])  Active Problems:    Hyperlipidemia    Hyperglycemia    Chest pain    Stage 2 chronic kidney disease  Resolved Problems:    * No resolved hospital problems  *      Service:  [facility], [facility]  Consulting Providers   Cardiology:  Dr Matamoros and [facility]    Procedures Performed   8/8:  Cardiac catheterization:  CORONARY CIRCULATION:  Left main: Normal   LAD: Normal   Circumflex: Normal   RCA: Normal  NOTE: cath induced vasospasm of prox RCA reversed with IC nitroglycerin    Hospital Studies:  8/7:  Chest x-ray:  No acute disease    Results from last 7 days   Lab Units 08/07/19  0834   WBC Thousand/uL 4 26*   HEMOGLOBIN g/dL 16 3   HEMATOCRIT % 45 7   PLATELETS Thousands/uL 275     Results from last 7 days   Lab Units 08/08/19  0407 08/07/19  0834   POTASSIUM mmol/L 3 9 3 9   CHLORIDE mmol/L 104 101   CO2 mmol/L 30 30   BUN mg/dL 14 14   CREATININE mg/dL 1 22 1 44*   CALCIUM mg/dL 8 8 9 7       History and Physical Exam:  Please refer to the Admission H&P note    Hospital Course by Problem  1-chest pain, and positive troponin secondary to cardiac vasospasm:   with associated non MI elevation in troponin    -Patient presented with substernal chest pain, without associated symptoms, trigger, exacerbating or alleviating factors or radiation, for 2 days in a row  His EKG did not have any acute ischemic changes however his troponin was initially elevated at 4    His presentation was quite concerning for an acute non STEMI/acute coronary syndrome   -patient was evaluated by the cardiology team:  He was started on medical therapy with aspirin, Plavix load, beta-blocker, statin, and heparin ACS protocol    -patient underwent cardiac catheterization today without any significant coronary disease  Patient however was noted to have RCA spasm during the catheterization that resolved with  Nitroglycerin   -patient's case was discussed with the cardiology team prior to discharge:  Patient's chest pain as well as elevated troponins are likely secondary to cardiac vasospasm  The recommend starting diltiazem 240 mg once daily  Patient will also be given a prescription for nitroglycerin sublingually  -patient's positive troponin was secondary to a non MI elevation in troponin secondary to coronary vasospasms     2-hyperlipidemia:  Patient's be maintained on diet therapy as an outpatient  His blood work from 07/27 was reviewed with a total cholesterol of 238, triglycerides 306, HDL 35,   He does not have any significant coronary disease  He will not be initiated on a statin follow up with his primary care provider     3-hyperglycemia:  Patient noted to have mild hyperglycemia the office  A1c still pending  Outpatient follow-up     4-probable chronic kidney disease stage 2:  Patient's baseline creatinine appears to range 1 3 on his blood work from last month  Current creatinine is 1 4  Suspect he has underlying chronic kidney disease stage 2     5-family:  Updated patient as well as his wife and mother at the bedside  Answered all questions  Discussed with Cardiology, Dr Fredi Dai  Discussed with patient's nurse and     Discharge Condition: Improved  Discharge Disposition:  Home    Discharge Note and Physical Exam:   Patient denies any current complaints  Notes discomfort from his right wrist where he has the hemoband place  No other discomfort anywhere else  No shortness of breath  No nausea, vomiting, diarrhea  No other complaints      Vitals:    08/08/19 1245   BP: 133/87   Pulse: 65   Resp: 20 Temp:    SpO2:      General:  Pleasant, non-tachypnic, non-dyspnic  Conversant  Sitting up in bed  Family at the bedside  Heart: Regular rate and rhythm, S1S2 present  No murmur, rub or gallop  Lungs: Clear to auscultation bilaterally, no wheezing, rhonchi, or crackles  Good air movement  No accessory muscle use or respiratory distress  Abdomen: soft, non-tender, non-distended, NABS  No rebound or guarding  No mass or peritoneal signs  Extremities: no clubbing, cyanosis or edema  2+ pedal pulses bilaterally  Neurologic:  Awake alert and oriented  Fluent and goal directed speech  Skin: warm and dry  No petechiae, purpura or rash  Discharge Medications   Please see Medical Reconciliation Discharge Form    Discharge Follow Up Appointments:   LACY Machado': 1 week  DR Gomez: 4 weeks    Discharge  Statement   Total Time Spent today including physical exam, discussion with patient and family, cardiologist, and discharge arrangements/care = 40 minutes      This note has been constructed using a voice recognition system

## 2019-08-08 NOTE — PLAN OF CARE
Problem: CARDIOVASCULAR - ADULT  Goal: Maintains optimal cardiac output and hemodynamic stability  Description  INTERVENTIONS:  - Monitor I/O, vital signs and rhythm  - Monitor for S/S and trends of decreased cardiac output i e  bleeding, hypotension  - Administer and titrate ordered vasoactive medications to optimize hemodynamic stability  - Assess quality of pulses, skin color and temperature  - Assess for signs of decreased coronary artery perfusion - ex   Angina  - Instruct patient to report change in severity of symptoms  Outcome: Progressing  Goal: Absence of cardiac dysrhythmias or at baseline rhythm  Description  INTERVENTIONS:  - Continuous cardiac monitoring, monitor vital signs, obtain 12 lead EKG if indicated  - Administer antiarrhythmic and heart rate control medications as ordered  - Monitor electrolytes and administer replacement therapy as ordered  Outcome: Progressing     Problem: METABOLIC, FLUID AND ELECTROLYTES - ADULT  Goal: Electrolytes maintained within normal limits  Description  INTERVENTIONS:  - Monitor labs and assess patient for signs and symptoms of electrolyte imbalances  - Administer electrolyte replacement as ordered  - Monitor response to electrolyte replacements, including repeat lab results as appropriate  - Instruct patient on fluid and nutrition as appropriate  Outcome: Progressing  Goal: Fluid balance maintained  Description  INTERVENTIONS:  - Monitor labs and assess for signs and symptoms of volume excess or deficit  - Monitor I/O and WT  - Instruct patient on fluid and nutrition as appropriate  Outcome: Progressing     Problem: HEMATOLOGIC - ADULT  Goal: Maintains hematologic stability  Description  INTERVENTIONS  - Assess for signs and symptoms of bleeding or hemorrhage  - Monitor labs  - Administer supportive blood products/factors as ordered and appropriate  Outcome: Progressing     Problem: PAIN - ADULT  Goal: Verbalizes/displays adequate comfort level or baseline comfort level  Description  Interventions:  - Encourage patient to monitor pain and request assistance  - Assess pain using appropriate pain scale  - Administer analgesics based on type and severity of pain and evaluate response  - Implement non-pharmacological measures as appropriate and evaluate response  - Consider cultural and social influences on pain and pain management  - Notify physician/advanced practitioner if interventions unsuccessful or patient reports new pain  Outcome: Progressing     Problem: SAFETY ADULT  Goal: Patient will remain free of falls  Description  INTERVENTIONS:  - Assess patient frequently for physical needs  -  Identify cognitive and physical deficits and behaviors that affect risk of falls    -  [place] fall precautions as indicated by assessment   - Educate patient/family on patient safety including physical limitations  - Instruct patient to call for assistance with activity based on assessment  - Modify environment to reduce risk of injury  - Consider OT/PT consult to assist with strengthening/mobility  Outcome: Progressing     Problem: DISCHARGE PLANNING  Goal: Discharge to home or other facility with appropriate resources  Description  INTERVENTIONS:  - Identify barriers to discharge w/patient and caregiver  - Arrange for needed discharge resources and transportation as appropriate  - Identify discharge learning needs (meds, wound care, etc )  - Arrange for interpretive services to assist at discharge as needed  - Refer to Case Management Department for coordinating discharge planning if the patient needs post-hospital services based on physician/advanced practitioner order or complex needs related to functional status, cognitive ability, or social support system  Outcome: Progressing     Problem: Knowledge Deficit  Goal: Patient/family/caregiver demonstrates understanding of disease process, treatment plan, medications, and discharge instructions  Description  Complete learning assessment and assess knowledge base    Interventions:  - Provide teaching at level of understanding  - Provide teaching via preferred learning methods  Outcome: Progressing     Problem: DISCHARGE PLANNING - CARE MANAGEMENT  Goal: Discharge to post-acute care or home with appropriate resources  Description  INTERVENTIONS:  - Conduct assessment to determine patient/family and health care team treatment goals, and need for post-acute services based on payer coverage, community resources, and patient preferences, and barriers to discharge  - Address psychosocial, clinical, and financial barriers to discharge as identified in assessment in conjunction with the patient/family and health care team  - Arrange appropriate level of post-acute services according to patients   needs and preference and payer coverage in collaboration with the physician and health care team  - Communicate with and update the patient/family, physician, and health care team regarding progress on the discharge plan  - Arrange appropriate transportation to post-acute venues  Outcome: Progressing

## 2019-08-08 NOTE — PLAN OF CARE
Problem: CARDIOVASCULAR - ADULT  Goal: Maintains optimal cardiac output and hemodynamic stability  Description  INTERVENTIONS:  - Monitor I/O, vital signs and rhythm  - Monitor for S/S and trends of decreased cardiac output i e  bleeding, hypotension  - Administer and titrate ordered vasoactive medications to optimize hemodynamic stability  - Assess quality of pulses, skin color and temperature  - Assess for signs of decreased coronary artery perfusion - ex   Angina  - Instruct patient to report change in severity of symptoms  8/8/2019 5390 by Alessandro Baires RN  Outcome: Progressing  8/8/2019 0721 by Alessandro Baires RN  Outcome: Progressing  Goal: Absence of cardiac dysrhythmias or at baseline rhythm  Description  INTERVENTIONS:  - Continuous cardiac monitoring, monitor vital signs, obtain 12 lead EKG if indicated  - Administer antiarrhythmic and heart rate control medications as ordered  - Monitor electrolytes and administer replacement therapy as ordered  8/8/2019 0596 by Alessandro Baires RN  Outcome: Progressing  8/8/2019 0721 by Alessandro Baires RN  Outcome: Progressing     Problem: METABOLIC, FLUID AND ELECTROLYTES - ADULT  Goal: Electrolytes maintained within normal limits  Description  INTERVENTIONS:  - Monitor labs and assess patient for signs and symptoms of electrolyte imbalances  - Administer electrolyte replacement as ordered  - Monitor response to electrolyte replacements, including repeat lab results as appropriate  - Instruct patient on fluid and nutrition as appropriate  8/8/2019 8754 by Alessandro Baires RN  Outcome: Progressing  8/8/2019 0721 by Alessandro Baires RN  Outcome: Progressing  Goal: Fluid balance maintained  Description  INTERVENTIONS:  - Monitor labs and assess for signs and symptoms of volume excess or deficit  - Monitor I/O and WT  - Instruct patient on fluid and nutrition as appropriate  8/8/2019 7100 by Alessandro Baires RN  Outcome: Progressing  8/8/2019 0721 by Alessandro Baires RN  Outcome: Progressing     Problem: HEMATOLOGIC - ADULT  Goal: Maintains hematologic stability  Description  INTERVENTIONS  - Assess for signs and symptoms of bleeding or hemorrhage  - Monitor labs  - Administer supportive blood products/factors as ordered and appropriate  8/8/2019 0722 by Bon Iniguez RN  Outcome: Progressing  8/8/2019 0721 by Bon Iniguez RN  Outcome: Progressing     Problem: PAIN - ADULT  Goal: Verbalizes/displays adequate comfort level or baseline comfort level  Description  Interventions:  - Encourage patient to monitor pain and request assistance  - Assess pain using appropriate pain scale  - Administer analgesics based on type and severity of pain and evaluate response  - Implement non-pharmacological measures as appropriate and evaluate response  - Consider cultural and social influences on pain and pain management  - Notify physician/advanced practitioner if interventions unsuccessful or patient reports new pain  8/8/2019 0722 by Bon Iniguez RN  Outcome: Progressing  8/8/2019 0721 by Bon Iniguez RN  Outcome: Progressing     Problem: SAFETY ADULT  Goal: Patient will remain free of falls  Description  INTERVENTIONS:  - Assess patient frequently for physical needs  -  Identify cognitive and physical deficits and behaviors that affect risk of falls    -  [place] fall precautions as indicated by assessment   - Educate patient/family on patient safety including physical limitations  - Instruct patient to call for assistance with activity based on assessment  - Modify environment to reduce risk of injury  - Consider OT/PT consult to assist with strengthening/mobility  8/8/2019 0722 by Bon Iniguez RN  Outcome: Progressing  8/8/2019 0721 by Bon Iniguez RN  Outcome: Progressing     Problem: DISCHARGE PLANNING  Goal: Discharge to home or other facility with appropriate resources  Description  INTERVENTIONS:  - Identify barriers to discharge w/patient and caregiver  - Arrange for needed discharge resources and transportation as appropriate  - Identify discharge learning needs (meds, wound care, etc )  - Arrange for interpretive services to assist at discharge as needed  - Refer to Case Management Department for coordinating discharge planning if the patient needs post-hospital services based on physician/advanced practitioner order or complex needs related to functional status, cognitive ability, or social support system  8/8/2019 0722 by Madina Ye RN  Outcome: Progressing  8/8/2019 0721 by Madina Ye RN  Outcome: Progressing     Problem: Knowledge Deficit  Goal: Patient/family/caregiver demonstrates understanding of disease process, treatment plan, medications, and discharge instructions  Description  Complete learning assessment and assess knowledge base    Interventions:  - Provide teaching at level of understanding  - Provide teaching via preferred learning methods  8/8/2019 0722 by Madina Ye RN  Outcome: Progressing  8/8/2019 0721 by Madina Ye RN  Outcome: Progressing     Problem: DISCHARGE PLANNING - CARE MANAGEMENT  Goal: Discharge to post-acute care or home with appropriate resources  Description  INTERVENTIONS:  - Conduct assessment to determine patient/family and health care team treatment goals, and need for post-acute services based on payer coverage, community resources, and patient preferences, and barriers to discharge  - Address psychosocial, clinical, and financial barriers to discharge as identified in assessment in conjunction with the patient/family and health care team  - Arrange appropriate level of post-acute services according to patients   needs and preference and payer coverage in collaboration with the physician and health care team  - Communicate with and update the patient/family, physician, and health care team regarding progress on the discharge plan  - Arrange appropriate transportation to post-acute venues  8/8/2019 0419 by Magnolia Waite, RN  Outcome: Progressing  8/8/2019 0721 by Magnolia Waite RN  Outcome: Progressing

## 2019-08-08 NOTE — BRIEF OP NOTE (RAD/CATH)
Right radial catheterization    Findings:  LVEDP 14 mmHg    Normal epicardial coronary arteries    Plan:  Elevated troponin not due to epicardial coronary artery disease

## 2019-08-12 ENCOUNTER — TRANSITIONAL CARE MANAGEMENT (OUTPATIENT)
Dept: FAMILY MEDICINE CLINIC | Facility: CLINIC | Age: 29
End: 2019-08-12

## 2019-08-29 RX ORDER — MELOXICAM 15 MG/1
TABLET ORAL
COMMUNITY
End: 2019-09-20

## 2019-09-20 ENCOUNTER — OFFICE VISIT (OUTPATIENT)
Dept: CARDIOLOGY CLINIC | Facility: CLINIC | Age: 29
End: 2019-09-20
Payer: COMMERCIAL

## 2019-09-20 VITALS
HEIGHT: 67 IN | DIASTOLIC BLOOD PRESSURE: 86 MMHG | SYSTOLIC BLOOD PRESSURE: 128 MMHG | BODY MASS INDEX: 27.34 KG/M2 | WEIGHT: 174.2 LBS | HEART RATE: 65 BPM | RESPIRATION RATE: 16 BRPM

## 2019-09-20 DIAGNOSIS — I20.1 CORONARY VASOSPASM (HCC): ICD-10-CM

## 2019-09-20 DIAGNOSIS — I21.4 NSTEMI (NON-ST ELEVATED MYOCARDIAL INFARCTION) (HCC): Primary | ICD-10-CM

## 2019-09-20 DIAGNOSIS — E78.5 DYSLIPIDEMIA: ICD-10-CM

## 2019-09-20 PROCEDURE — 93000 ELECTROCARDIOGRAM COMPLETE: CPT

## 2019-09-20 PROCEDURE — 99214 OFFICE O/P EST MOD 30 MIN: CPT

## 2019-09-20 RX ORDER — DILTIAZEM HYDROCHLORIDE 240 MG/1
240 CAPSULE, COATED, EXTENDED RELEASE ORAL DAILY
Qty: 90 CAPSULE | Refills: 3 | Status: SHIPPED | OUTPATIENT
Start: 2019-09-20 | End: 2020-10-27 | Stop reason: SDUPTHER

## 2019-09-20 RX ORDER — DILTIAZEM HYDROCHLORIDE 240 MG/1
240 CAPSULE, COATED, EXTENDED RELEASE ORAL DAILY
Qty: 30 CAPSULE | Refills: 0 | Status: SHIPPED | OUTPATIENT
Start: 2019-09-20 | End: 2019-09-20

## 2019-09-20 NOTE — PROGRESS NOTES
Cardiology   [address] [facility]  [facility] [place], [address] [place] [id number]  [phone]  [phone]  09/20/19  Vale Harada   1990  [id number]     Referring Physian: Vinay Max, [address]  [address] [facility], [address] [place] [address]     HPI: Vale Harada is a 26 y o  male with a past medical history of stage 2 chronic kidney disease, dyslipidemia, and recent hospitalization in August 2019 for chest pain which was thought to be secondary to coronary vasospasm  He was seen in the hospital and referred for cardiac catheterization as a inpatient which showed no epicardial coronary stenosis  Vaso spasm was noted during catheterization and responsive to intracoronary nitroglycerin  He was placed on diltiazem 240 mg there after, and has tolerated this well  He presents today for follow-up  He notes that after his hospitalization he felt some lightheadedness initially when beginning diltiazem for few days, however this has completely resolved now  He feels normal   He denies any recurrent chest pain  Review of Systems   Constitutional: Negative for chills and fever  HENT: Negative for facial swelling and sore throat  Eyes: Negative for visual disturbance  Respiratory: Negative for cough, chest tightness, shortness of breath and wheezing  Cardiovascular: Negative for chest pain, palpitations and leg swelling  Gastrointestinal: Negative for abdominal pain, blood in stool, constipation, diarrhea, nausea and vomiting  Endocrine: Negative for cold intolerance and heat intolerance  Genitourinary: Negative for decreased urine volume, difficulty urinating, dysuria and hematuria  Musculoskeletal: Negative for arthralgias, back pain and myalgias  Skin: Negative for rash  Neurological: Negative for dizziness, syncope, weakness and numbness  Psychiatric/Behavioral: Negative for agitation, behavioral problems and confusion  The patient is not nervous/anxious  OBJECTIVE  Vitals:    09/20/19 1132   BP: 128/86   Pulse: 65   Resp: 16       Physical Exam   General appearance: alert and oriented, in no acute distress  Head: Normocephalic, without obvious abnormality, atraumatic  Eyes: conjunctivae/corneas clear  Anicteric  Neck: no adenopathy, no carotid bruit, no JVD  Lungs: clear to auscultation bilaterally  Heart: regular rate and rhythm, S1, S2 normal, no murmur, click, rub or gallop  Abdomen: soft, non-tender; bowel sounds normal; no masses,  no organomegaly  Extremities: extremities normal, warm and well-perfused; no cyanosis, clubbing, or edema  Skin: Skin color, texture, turgor normal  No rashes or lesions     EKG:  Results for orders placed or performed in visit on 09/20/19   POCT ECG    Impression    Sinus rhythm at 65 beats per minute with nonspecific T-wave inversions in the inferior and lateral leads        IMPRESSION:  1  Coronary artery vasospasms  2  Dyslipidemia  3  Elevated blood pressure    DISCUSSION/RECOMMENDATIONS:  Overall the patient is doing well at this time  I have personally reviewed the cardiac catheterization films myself, and with the patient today  There is no epicardial coronary stenosis  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, however I believe this is insignificant with regards to his presentation  In the catheterization report he was noted to have induced coronary vasospasms responsive to intracoronary nitroglycerin  He initially had some side effects with the diltiazem when he 1st started the medication, however they have subsided now  Ordered refill on his medication today  He was noted to have dyslipidemia on his hospital labs  We should repeat these in a few months  His blood pressure is slightly elevated today however he states he ran out of his diltiazem a few days ago  He states when he was taking his blood pressure was controlled    I have sent a refill for the medication today  He will follow-up in 2019 for re-evaluation  If he is doing well at that time will increase his follow-up interval   --------------------------------------------------------------------------------  TREADMILL STRESS  No results found for this or any previous visit    ----------------------------------------------------------------------------------------------  NUCLEAR STRESS TEST: No results found for this or any previous visit   --------------------------------------------------------------------------------  CATH:    Results for orders placed during the hospital encounter of 19   Cardiac catheterization    Narrative 18 [address], [facility]  [phone]    [facility]    Invasive Cardiovascular Lab Complete Report    Patient: Levora Fabry  MR number: [id number]  Account number: [de-identified]  Study date: 2019  Gender: Male  : 1990  Height: 66 9 in  Weight: 166 3 lb  BSA: 1 87 mï¾²    Allergies: NO KNOWN ALLERGIES    Diagnostic Cardiologist:  Elwanda Cranker, DO  Primary Physician:  Balwinder Ponce    SUMMARY    CORONARY CIRCULATION:  Left main: Normal   LAD: Normal   Circumflex: Normal   RCA: Normal     HEMODYNAMICS:  Hemodynamic assessment demonstrated mildly elevated LVEDP  INDICATIONS:  --  elev trop  --  Possible CAD: unstable angina  PROCEDURES PERFORMED    --  Left heart catheterization without ventriculogram   --  Left coronary angiography  --  Right coronary angiography  --  Inpatient  --  Mod Sedation Same Physician Initial 15min  --  Coronary Catheterization (w/ LHC)  PROCEDURE: The risks and alternatives of the procedures and conscious sedation were explained to the patient and informed consent was obtained  The patient was brought to the cath lab and placed on the table   The planned puncture sites  were prepped and draped in the usual sterile fashion  --  Right radial artery access  After performing an Mitchell's test to verify adequate ulnar artery supply to the hand, the radial site was prepped  The puncture site was infiltrated with local anesthetic  The vessel was accessed using the  modified Seldinger technique, a wire was advanced into the vessel, and a sheath was advanced over the wire into the vessel  --  Left heart catheterization without ventriculogram  A catheter was advanced over a guidewire into the ascending aorta  After recording ascending aortic pressure, the catheter was advanced across the aortic valve and left ventricular  pressure was recorded  The catheter was pulled back across the aortic valve and into the ascending aorta and pullback pressures were obtained  --  Left coronary artery angiography  A catheter was advanced over a guidewire into the aorta and positioned in the left coronary artery ostium under fluoroscopic guidance  Angiography was performed  --  Right coronary artery angiography  A catheter was advanced over a guidewire into the aorta and positioned in the right coronary artery ostium under fluoroscopic guidance  Angiography was performed  --  Inpatient  --  Mod Sedation Same Physician Initial 15min  --  Coronary Catheterization (w/ [facility])  PROCEDURE COMPLETION: The patient tolerated the procedure well and was discharged from the cath lab  TIMING: Test started at 10:17  Test concluded at 10:38  HEMOSTASIS: The sheath was removed  The site was compressed with a Hemoband  device  Hemostasis was obtained  MEDICATIONS GIVEN: Fentanyl (1OOmcg/2 ml), 50 mcg, IV, at 10:22  Versed (2mg/2ml), 2 mg, IV, at 10:22  Fentanyl (1OOmcg/2 ml), 50 mcg, IV, at 10:25  Versed (2mg/2ml), 2 mg, IV, at 10:25  1% Lidocaine, 1 ml,  subcutaneously, at 10:25  Verapamil (5mg/2ml), 2 5 mg, IV, at 10:27  Heparin 1000 units/ml, 4,000 units, IV, at 10:27  Nitroglycerin (200mcg/ml), 200 mcg, at 10:27   Nitroglycerine (200mcg/ml), 200 mcg, at 10:34  CONTRAST GIVEN: 65 ml  Omnipaque (350mg I /ml)  RADIATION EXPOSURE: Fluoroscopy time: 3 73 min  HEMODYNAMICS: Hemodynamic assessment demonstrated mildly elevated LVEDP  CORONARY VESSELS:   --  The coronary circulation is right dominant  --  Left main: Normal   --  LAD: Normal   --  Circumflex: Normal   --  RCA: Normal     IMPRESSIONS:  There is no significant coronary artery disease  Elev trop not from type 1 MI    RECOMMENDATIONS  Patient management should include continuation of medical therapy and aggressive risk factor modification  DISPOSITION:  The patient left the catheterization laboratory in stable condition  NOTE: cath induced vasospasm of prox RCA reversed with IC nitroglycerin    Prepared and signed by    Angel Vera DO  Signed 2019 11:44:03    Study diagram    Hemodynamic tables    Pressures:  Baseline  Pressures:  - HR: 73  Pressures:  - Rhythm:  Pressures:  -- Left Ventricle (s/edp): 109/16/--    Outputs:  Baseline  Outputs:  -- CALCULATIONS: [age]  Outputs:  -- CALCULATIONS: Body Surface Area: 1 87  Outputs:  -- CALCULATIONS: Height in cm: 170 00  Outputs:  -- CALCULATIONS: Sex: Male  Outputs:  -- CALCULATIONS: Weight in k 60           --------------------------------------------------------------------------------  ECHO:   Results for orders placed during the hospital encounter of 19   Echo complete with contrast if indicated    Sanket Pierre 48  Margaret Ruiz 35  [facility], [address]  [phone]    Transthoracic Echocardiogram  2D, M-mode, Doppler, and Color Doppler    Study date:  07-Aug-2019    Patient: Mayur Barrientos  MR number: [id number]  Account number: [de-identified]  : 1990  Age: 26 years  Gender: Male  Status: Inpatient  Location: Emergency room  Height: 67 in  Weight: 171 lb  BP: 139/ 82 mmHg    Indications: Chest pain  Increased troponin      Diagnoses: R07 9 - Chest pain, unspecified    Sonographer:  NETTA Nicholson  Referring Physician:  Sofia Bhardwaj MD  Group:  [facility]  Interpreting Physician:  Sofia Bhardwaj MD    SUMMARY    LEFT VENTRICLE:  Systolic function was normal  Ejection fraction was estimated to be 60 %  There were no regional wall motion abnormalities  There was mild concentric hypertrophy  Doppler parameters were consistent with abnormal left ventricular relaxation (grade 1 diastolic dysfunction)  HISTORY: PRIOR HISTORY: Patient has no history of cardiovascular disease  PROCEDURE: The procedure was performed in the emergency room  This was a routine study  The transthoracic approach was used  The study included complete 2D imaging, M-mode, complete spectral Doppler, and color Doppler  The heart rate was  69 bpm, at the start of the study  Image quality was good  LEFT VENTRICLE: Size was normal  Systolic function was normal  Ejection fraction was estimated to be 60 %  There were no regional wall motion abnormalities  There was mild concentric hypertrophy  No evidence of apical thrombus  DOPPLER:  Doppler parameters were consistent with abnormal left ventricular relaxation (grade 1 diastolic dysfunction)  RIGHT VENTRICLE: The size was normal  Systolic function was normal  Wall thickness was normal     LEFT ATRIUM: Size was normal     RIGHT ATRIUM: Size was normal     MITRAL VALVE: Valve structure was normal  There was normal leaflet separation  DOPPLER: The transmitral velocity was within the normal range  There was no evidence for stenosis  There was no significant regurgitation  AORTIC VALVE: The valve was trileaflet  Leaflets exhibited normal thickness and normal cuspal separation  DOPPLER: Transaortic velocity was within the normal range  There was no evidence for stenosis  There was no significant  regurgitation  TRICUSPID VALVE: The valve structure was normal  There was normal leaflet separation   DOPPLER: The transtricuspid velocity was within the normal range  There was no evidence for stenosis  There was no significant regurgitation  PULMONIC VALVE: Leaflets exhibited normal thickness, no calcification, and normal cuspal separation  DOPPLER: The transpulmonic velocity was within the normal range  There was no significant regurgitation  PERICARDIUM: There was no pericardial effusion  The pericardium was normal in appearance  AORTA: The root exhibited normal size  SYSTEMIC VEINS: IVC: The inferior vena cava was normal in size  SYSTEM MEASUREMENT TABLES    2D  %FS: 27 1 %  Ao Diam: 2 6 cm  EDV(Teich): 131 5 ml  EF(Teich): 52 5 %  ESV(Teich): 62 5 ml  IVSd: 1 cm  LA Area: 12 4 cm2  LA Diam: 3 5 cm  LVEDV MOD A4C: 98 ml  LVEF MOD A4C: 62 3 %  LVESV MOD A4C: 37 ml  LVIDd: 5 2 cm  LVIDs: 3 8 cm  LVLd A4C: 8 2 cm  LVLs A4C: 6 4 cm  LVPWd: 0 7 cm  RA Area: 12 8 cm2  RVIDd: 3 cm  SV MOD A4C: 61 1 ml  SV(Teich): 69 ml    MM  TAPSE: 1 9 cm    PW  E': 0 1 m/s  E/E': 9 6  MV A Markus: 0 6 m/s  MV Dec [place]: 2 4 m/s2  MV DecT: 258 2 ms  MV E Markus: 0 6 m/s  MV E/A Ratio: 0 9  MV PHT: 74 9 ms  MVA By PHT: 2 9 cm2    [facility]    Prepared and electronically signed by    Rose Marie Carty MD  Signed 07-Aug-2019 13:08:36       No results found for this or any previous visit   --------------------------------------------------------------------------------  HOLTER  No results found for this or any previous visit   --------------------------------------------------------------------------------  Diagnoses and all orders for this visit:    NSTEMI (non-ST elevated myocardial infarction) ([facility] )  -     POCT ECG    Coronary vasospasm (HCC)  -     Discontinue: diltiazem (CARDIZEM CD) 240 mg 24 hr capsule; Take 1 capsule (240 mg total) by mouth daily  -     diltiazem (CARDIZEM CD) 240 mg 24 hr capsule;  Take 1 capsule (240 mg total) by mouth daily    Dyslipidemia  -     Lipid panel; Future       ======================================================    Past Medical History:   Diagnosis Date    Hyperglycemia     Hyperlipidemia      History reviewed  No pertinent surgical history  Medications  Current Outpatient Medications   Medication Sig Dispense Refill    diltiazem (CARDIZEM CD) 240 mg 24 hr capsule Take 1 capsule (240 mg total) by mouth daily 90 capsule 3    nitroglycerin (NITROSTAT) 0 4 mg SL tablet Place 1 tablet (0 4 mg total) under the tongue every 5 (five) minutes as needed for chest pain 30 tablet 0     No current facility-administered medications for this visit           Allergies   Allergen Reactions    No Known Allergies        Social History     Socioeconomic History    Marital status: /Civil Union     Spouse name: Not on file    Number of children: Not on file    Years of education: Not on file    Highest education level: Not on file   Occupational History    Not on file   Social Needs    Financial resource strain: Not on file    Food insecurity:     Worry: Not on file     Inability: Not on file    Transportation needs:     Medical: Not on file     Non-medical: Not on file   Tobacco Use    Smoking status: Never Smoker    Smokeless tobacco: Never Used   Substance and Sexual Activity    Alcohol use: Yes     Frequency: Monthly or less    Drug use: Never    Sexual activity: Yes     Partners: Female   Lifestyle    Physical activity:     Days per week: Not on file     Minutes per session: Not on file    Stress: Not on file   Relationships    Social connections:     Talks on phone: Not on file     Gets together: Not on file     Attends [employer] service: Not on file     Active member of club or organization: Not on file     Attends meetings of clubs or organizations: Not on file     Relationship status: Not on file    Intimate partner violence:     Fear of current or ex partner: Not on file     Emotionally abused: Not on file     Physically abused: Not on file     Forced sexual activity: Not on file   Other Topics Concern    Not on file   Social History Narrative    Not on file        Family History   Problem Relation Age of Onset    Hypertension Mother     Hyperlipidemia Mother     Hypertension Father     Hyperlipidemia Father        Lab Results   Component Value Date    WBC 4 26 (L) 08/07/2019    HGB 16 3 08/07/2019    HCT 45 7 08/07/2019    MCV 88 08/07/2019     08/07/2019      Lab Results   Component Value Date    SODIUM 139 08/08/2019    K 3 9 08/08/2019     08/08/2019    CO2 30 08/08/2019    BUN 14 08/08/2019    CREATININE 1 22 08/08/2019    GLUC 111 08/08/2019    CALCIUM 8 8 08/08/2019      No results found for: HGBA1C   No results found for: CHOL  Lab Results   Component Value Date    HDL 35 (L) 07/27/2019     Lab Results   Component Value Date    LDLCALC 142 (H) 07/27/2019     Lab Results   Component Value Date    TRIG 306 (H) 07/27/2019     No results found for: [place], [place]   Lab Results   Component Value Date    INR 0 96 08/07/2019    PROTIME 12 9 08/07/2019          Patient Active Problem List    Diagnosis Date Noted    Troponin I above reference range 08/08/2019    Coronary vasospasm ([facility] ) 08/07/2019    Chest pain 08/07/2019    Stage 2 chronic kidney disease 08/07/2019    Hyperlipidemia     Hyperglycemia     Annual physical exam 07/30/2019    Mixed hyperlipidemia 07/30/2019    Acute pain of left shoulder 07/05/2019    De Quervain's tenosynovitis 01/03/2013    Vitamin D deficiency 12/12/2012    Functional murmur 12/06/2012       Portions of the record may have been created with voice recognition software  Occasional wrong word or "sound a like" substitutions may have occurred due to the inherent limitations of voice recognition software  Read the chart carefully and recognize, using context, where substitutions have occurred        La Done, DO  9/20/2019 12:05 PM

## 2020-01-28 ENCOUNTER — OFFICE VISIT (OUTPATIENT)
Dept: FAMILY MEDICINE CLINIC | Facility: CLINIC | Age: 30
End: 2020-01-28
Payer: COMMERCIAL

## 2020-01-28 VITALS
RESPIRATION RATE: 18 BRPM | BODY MASS INDEX: 27.15 KG/M2 | TEMPERATURE: 97 F | OXYGEN SATURATION: 97 % | HEIGHT: 67 IN | WEIGHT: 173 LBS | SYSTOLIC BLOOD PRESSURE: 120 MMHG | DIASTOLIC BLOOD PRESSURE: 80 MMHG | HEART RATE: 84 BPM

## 2020-01-28 DIAGNOSIS — I10 ESSENTIAL HYPERTENSION: ICD-10-CM

## 2020-01-28 DIAGNOSIS — E78.2 MIXED HYPERLIPIDEMIA: ICD-10-CM

## 2020-01-28 DIAGNOSIS — I20.1 CORONARY VASOSPASM (HCC): ICD-10-CM

## 2020-01-28 DIAGNOSIS — M54.50 ACUTE MIDLINE LOW BACK PAIN WITHOUT SCIATICA: Primary | ICD-10-CM

## 2020-01-28 DIAGNOSIS — Z11.4 ENCOUNTER FOR SCREENING FOR HIV: ICD-10-CM

## 2020-01-28 PROCEDURE — 3008F BODY MASS INDEX DOCD: CPT | Performed by: NURSE PRACTITIONER

## 2020-01-28 PROCEDURE — 1036F TOBACCO NON-USER: CPT | Performed by: NURSE PRACTITIONER

## 2020-01-28 PROCEDURE — 3079F DIAST BP 80-89 MM HG: CPT | Performed by: NURSE PRACTITIONER

## 2020-01-28 PROCEDURE — 99214 OFFICE O/P EST MOD 30 MIN: CPT | Performed by: NURSE PRACTITIONER

## 2020-01-28 PROCEDURE — 3074F SYST BP LT 130 MM HG: CPT | Performed by: NURSE PRACTITIONER

## 2020-01-28 RX ORDER — NAPROXEN 500 MG/1
500 TABLET ORAL 2 TIMES DAILY WITH MEALS
Qty: 60 TABLET | Refills: 0 | Status: SHIPPED | OUTPATIENT
Start: 2020-01-28 | End: 2020-09-05

## 2020-01-28 NOTE — PROGRESS NOTES
Assessment/Plan:    Acute midline low back pain without sciatica  Pt recommended continue use of Tylenol to help decrease pain  Pt to continue keeping active and performing some light exercises such as walking and strengthening muscles  Advised to avoid heavy lifting and use of heat to back for 20 to 30 minutes every 2 hours  If symptoms do not improve within 4 to 6 weeks pt advised to start return to clinic and possibly start Physical therapy     -Ordering xray and NSAIDs to trial out  Follow up in 1 month  Mixed hyperlipidemia  -Patient was advised on starting medication  He refused at this time stating he will change his diet regimen  We are ordering lipid panel on patient today to check his cholesterol level  Coronary vasospasm [facility])  --Patient was in hospital 6 months ago due to chest pain secondary to cardiac vasospasm and started on Diltiazem 240mg daily tablet  His EKG showed in ER ischemic changes  He had non-STEMI and underwent cardiac catherization without coronary disease  Pt today was advised to continue on Diltiazem 240mg daily and use of nitro as ordered  Diagnoses and all orders for this visit:    Acute midline low back pain without sciatica  -     XR spine lumbar minimum 4 views non injury; Future  -     naproxen (NAPROSYN) 500 mg tablet; Take 1 tablet (500 mg total) by mouth 2 (two) times a day with meals    Mixed hyperlipidemia  -     Lipid panel; Future    Essential hypertension  -     Comprehensive metabolic panel; Future    Encounter for screening for HIV  -     HIV 1/2 AG-AB combo; Future    Coronary vasospasm (HCC)          Subjective:      Patient ID: Orlando Yeager is a 26 y o  male  Patient presents with: Follow-up: 6 months  Back Pain: x 3weeks      Back Pain   This is a new problem  The current episode started 1 to 4 weeks ago  The problem occurs constantly  The problem is unchanged  The pain is present in the lumbar spine   The quality of the pain is described as aching  The pain does not radiate  The pain is at a severity of 10/10  The pain is severe  The pain is the same all the time  The symptoms are aggravated by standing, bending, sitting and position  Stiffness is present all day  Pertinent negatives include no bladder incontinence, bowel incontinence, chest pain, dysuria, fever, leg pain, numbness or tingling  Risk factors include sedentary lifestyle, poor posture and lack of exercise  He has tried nothing for the symptoms  The treatment provided no relief  The following portions of the patient's history were reviewed and updated as appropriate: allergies, current medications, past family history, past medical history, past social history, past surgical history and problem list     Review of Systems   Constitutional: Negative  Negative for appetite change, fatigue and fever  HENT: Negative  Eyes: Negative  Respiratory: Negative  Negative for cough, chest tightness, shortness of breath and wheezing  Cardiovascular: Negative  Negative for chest pain and palpitations  Gastrointestinal: Negative  Negative for bowel incontinence  Endocrine: Negative  Genitourinary: Negative  Negative for bladder incontinence and dysuria  Musculoskeletal: Positive for back pain  Skin: Negative  Allergic/Immunologic: Negative  Neurological: Negative  Negative for tingling and numbness  Hematological: Negative  Psychiatric/Behavioral: Negative  Objective:      /80 (BP Location: Right arm, Patient Position: Sitting, Cuff Size: Standard)   Pulse 84   Temp (!) 97 °F (36 1 °C) (Oral)   Resp 18   Ht 5' 7" (1 702 m)   Wt 78 5 kg (173 lb)   SpO2 97%   BMI 27 10 kg/m²          Physical Exam   Constitutional: He is oriented to person, place, and time  He appears well-developed and well-nourished  No distress  HENT:   Head: Normocephalic and atraumatic     Right Ear: External ear normal    Left Ear: External ear normal    Nose: Nose normal    Mouth/Throat: Oropharynx is clear and moist    Eyes: Pupils are equal, round, and reactive to light  EOM are normal    Neck: Normal range of motion  Neck supple  Cardiovascular: Normal rate, regular rhythm and normal heart sounds  Exam reveals no gallop and no friction rub  No murmur heard  Pulmonary/Chest: Effort normal and breath sounds normal  No respiratory distress  He has no wheezes  Abdominal: Soft  Bowel sounds are normal    Musculoskeletal: Normal range of motion  Lymphadenopathy:     He has no cervical adenopathy  Neurological: He is alert and oriented to person, place, and time  Skin: Skin is warm and dry  Capillary refill takes less than 2 seconds  He is not diaphoretic  Psychiatric: He has a normal mood and affect  Thought content normal    Nursing note and vitals reviewed

## 2020-01-29 ENCOUNTER — APPOINTMENT (OUTPATIENT)
Dept: RADIOLOGY | Age: 30
End: 2020-01-29
Payer: COMMERCIAL

## 2020-01-29 DIAGNOSIS — M54.50 ACUTE MIDLINE LOW BACK PAIN WITHOUT SCIATICA: ICD-10-CM

## 2020-01-29 PROCEDURE — 72110 X-RAY EXAM L-2 SPINE 4/>VWS: CPT

## 2020-01-29 NOTE — ASSESSMENT & PLAN NOTE
Pt recommended continue use of Tylenol to help decrease pain  Pt to continue keeping active and performing some light exercises such as walking and strengthening muscles  Advised to avoid heavy lifting and use of heat to back for 20 to 30 minutes every 2 hours  If symptoms do not improve within 4 to 6 weeks pt advised to start return to clinic and possibly start Physical therapy     -Ordering xray and NSAIDs to trial out  Follow up in 1 month

## 2020-01-29 NOTE — ASSESSMENT & PLAN NOTE
--Patient was in hospital 6 months ago due to chest pain secondary to cardiac vasospasm and started on Diltiazem 240mg daily tablet  His EKG showed in ER ischemic changes  He had non-STEMI and underwent cardiac catherization without coronary disease  Pt today was advised to continue on Diltiazem 240mg daily and use of nitro as ordered

## 2020-01-29 NOTE — ASSESSMENT & PLAN NOTE
-Patient was advised on starting medication  He refused at this time stating he will change his diet regimen  We are ordering lipid panel on patient today to check his cholesterol level

## 2020-02-29 ENCOUNTER — APPOINTMENT (OUTPATIENT)
Dept: LAB | Facility: HOSPITAL | Age: 30
End: 2020-02-29
Payer: COMMERCIAL

## 2020-02-29 DIAGNOSIS — Z11.4 ENCOUNTER FOR SCREENING FOR HIV: ICD-10-CM

## 2020-02-29 DIAGNOSIS — E78.2 MIXED HYPERLIPIDEMIA: ICD-10-CM

## 2020-02-29 DIAGNOSIS — R89.9 ABNORMAL LABORATORY TEST: ICD-10-CM

## 2020-02-29 DIAGNOSIS — I10 ESSENTIAL HYPERTENSION: ICD-10-CM

## 2020-02-29 DIAGNOSIS — R73.9 HYPERGLYCEMIA: ICD-10-CM

## 2020-02-29 LAB
ALBUMIN SERPL BCP-MCNC: 4.1 G/DL (ref 3.5–5)
ALP SERPL-CCNC: 83 U/L (ref 46–116)
ALT SERPL W P-5'-P-CCNC: 29 U/L (ref 12–78)
ANION GAP SERPL CALCULATED.3IONS-SCNC: 9 MMOL/L (ref 4–13)
AST SERPL W P-5'-P-CCNC: 21 U/L (ref 5–45)
BASOPHILS # BLD AUTO: 0.02 THOUSANDS/ΜL (ref 0–0.1)
BASOPHILS NFR BLD AUTO: 1 % (ref 0–1)
BILIRUB SERPL-MCNC: 0.7 MG/DL (ref 0.2–1)
BUN SERPL-MCNC: 15 MG/DL (ref 5–25)
CALCIUM SERPL-MCNC: 8.9 MG/DL (ref 8.3–10.1)
CHLORIDE SERPL-SCNC: 102 MMOL/L (ref 100–108)
CHOLEST SERPL-MCNC: 166 MG/DL (ref 50–200)
CO2 SERPL-SCNC: 28 MMOL/L (ref 21–32)
CREAT SERPL-MCNC: 1.22 MG/DL (ref 0.6–1.3)
EOSINOPHIL # BLD AUTO: 0.09 THOUSAND/ΜL (ref 0–0.61)
EOSINOPHIL NFR BLD AUTO: 4 % (ref 0–6)
ERYTHROCYTE [DISTWIDTH] IN BLOOD BY AUTOMATED COUNT: 11.8 % (ref 11.6–15.1)
EST. AVERAGE GLUCOSE BLD GHB EST-MCNC: 97 MG/DL
GFR SERPL CREATININE-BSD FRML MDRD: 79 ML/MIN/1.73SQ M
GLUCOSE P FAST SERPL-MCNC: 100 MG/DL (ref 65–99)
HBA1C MFR BLD: 5 %
HCT VFR BLD AUTO: 44.7 % (ref 36.5–49.3)
HDLC SERPL-MCNC: 32 MG/DL
HGB BLD-MCNC: 15.6 G/DL (ref 12–17)
IMM GRANULOCYTES # BLD AUTO: 0.01 THOUSAND/UL (ref 0–0.2)
IMM GRANULOCYTES NFR BLD AUTO: 0 % (ref 0–2)
LDLC SERPL CALC-MCNC: 115 MG/DL (ref 0–100)
LYMPHOCYTES # BLD AUTO: 1.33 THOUSANDS/ΜL (ref 0.6–4.47)
LYMPHOCYTES NFR BLD AUTO: 51 % (ref 14–44)
MCH RBC QN AUTO: 30.9 PG (ref 26.8–34.3)
MCHC RBC AUTO-ENTMCNC: 34.9 G/DL (ref 31.4–37.4)
MCV RBC AUTO: 89 FL (ref 82–98)
MONOCYTES # BLD AUTO: 0.2 THOUSAND/ΜL (ref 0.17–1.22)
MONOCYTES NFR BLD AUTO: 8 % (ref 4–12)
NEUTROPHILS # BLD AUTO: 0.95 THOUSANDS/ΜL (ref 1.85–7.62)
NEUTS SEG NFR BLD AUTO: 36 % (ref 43–75)
NONHDLC SERPL-MCNC: 134 MG/DL
NRBC BLD AUTO-RTO: 0 /100 WBCS
PLATELET # BLD AUTO: 225 THOUSANDS/UL (ref 149–390)
PMV BLD AUTO: 9.5 FL (ref 8.9–12.7)
POTASSIUM SERPL-SCNC: 4.4 MMOL/L (ref 3.5–5.3)
PROT SERPL-MCNC: 7.6 G/DL (ref 6.4–8.2)
RBC # BLD AUTO: 5.05 MILLION/UL (ref 3.88–5.62)
SODIUM SERPL-SCNC: 139 MMOL/L (ref 136–145)
TRIGL SERPL-MCNC: 94 MG/DL
WBC # BLD AUTO: 2.6 THOUSAND/UL (ref 4.31–10.16)

## 2020-02-29 PROCEDURE — 80061 LIPID PANEL: CPT

## 2020-02-29 PROCEDURE — 36415 COLL VENOUS BLD VENIPUNCTURE: CPT

## 2020-02-29 PROCEDURE — 85025 COMPLETE CBC W/AUTO DIFF WBC: CPT

## 2020-02-29 PROCEDURE — 83036 HEMOGLOBIN GLYCOSYLATED A1C: CPT

## 2020-02-29 PROCEDURE — 87389 HIV-1 AG W/HIV-1&-2 AB AG IA: CPT

## 2020-02-29 PROCEDURE — 80053 COMPREHEN METABOLIC PANEL: CPT

## 2020-03-01 LAB — HIV 1+2 AB+HIV1 P24 AG SERPL QL IA: NORMAL

## 2020-03-03 ENCOUNTER — TELEPHONE (OUTPATIENT)
Dept: FAMILY MEDICINE CLINIC | Facility: CLINIC | Age: 30
End: 2020-03-03

## 2020-03-03 DIAGNOSIS — D72.9 ABNORMAL WHITE BLOOD CELL: Primary | ICD-10-CM

## 2020-08-30 ENCOUNTER — APPOINTMENT (EMERGENCY)
Dept: RADIOLOGY | Facility: HOSPITAL | Age: 30
DRG: 500 | End: 2020-08-30
Payer: COMMERCIAL

## 2020-08-30 ENCOUNTER — APPOINTMENT (INPATIENT)
Dept: RADIOLOGY | Facility: HOSPITAL | Age: 30
DRG: 500 | End: 2020-08-30
Payer: COMMERCIAL

## 2020-08-30 ENCOUNTER — HOSPITAL ENCOUNTER (INPATIENT)
Facility: HOSPITAL | Age: 30
LOS: 4 days | Discharge: HOME HEALTH | DRG: 500 | End: 2020-09-03
Attending: SURGERY | Admitting: SURGERY
Payer: COMMERCIAL

## 2020-08-30 DIAGNOSIS — S00.83XA FACIAL CONTUSION, INITIAL ENCOUNTER: ICD-10-CM
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
## 2020-08-30 DIAGNOSIS — I60.9 SAH (SUBARACHNOID HEMORRHAGE) (HCC): Primary | ICD-10-CM

## 2020-08-30 DIAGNOSIS — S12.120A OTHER CLOSED DISPLACED ODONTOID FRACTURE, INITIAL ENCOUNTER (HCC): ICD-10-CM

## 2020-08-30 DIAGNOSIS — S12.041A CLOSED NONDISPLACED LATERAL MASS FRACTURE OF FIRST CERVICAL VERTEBRA, INITIAL ENCOUNTER (HCC): ICD-10-CM

## 2020-08-30 DIAGNOSIS — S02.19XA: ICD-10-CM

## 2020-08-30 DIAGNOSIS — S52.502A CLOSED FRACTURE OF DISTAL END OF LEFT RADIUS, UNSPECIFIED FRACTURE MORPHOLOGY, INITIAL ENCOUNTER: ICD-10-CM

## 2020-08-30 DIAGNOSIS — S02.85XA FRACTURE OF RIGHT ORBITAL WALL (HCC): ICD-10-CM

## 2020-08-30 DIAGNOSIS — S62.101A CLOSED FRACTURE DISLOCATION OF RIGHT WRIST, INITIAL ENCOUNTER: ICD-10-CM

## 2020-08-30 DIAGNOSIS — S12.041D CLOSED NONDISPLACED LATERAL MASS FRACTURE OF FIRST CERVICAL VERTEBRA WITH ROUTINE HEALING, SUBSEQUENT ENCOUNTER: ICD-10-CM

## 2020-08-30 DIAGNOSIS — S62.101A: ICD-10-CM

## 2020-08-30 DIAGNOSIS — S12.121A OTHER CLOSED NONDISPLACED ODONTOID FRACTURE, INITIAL ENCOUNTER (HCC): ICD-10-CM

## 2020-08-30 PROBLEM — S06.0X9A CONCUSSION: Status: ACTIVE | Noted: 2020-08-30

## 2020-08-30 PROBLEM — S12.100A CLOSED DISPLACED FRACTURE OF SECOND CERVICAL VERTEBRA (HCC): Status: ACTIVE | Noted: 2020-08-30
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
## 2020-08-30 PROBLEM — S32.009A CLOSED FRACTURE OF LUMBAR VERTEBRA (HCC): Status: ACTIVE | Noted: 2020-08-30

## 2020-08-30 PROBLEM — S06.0XAA CONCUSSION: Status: ACTIVE | Noted: 2020-08-30

## 2020-08-30 PROBLEM — M25.532 LEFT WRIST PAIN: Status: ACTIVE | Noted: 2020-08-30

## 2020-08-30 PROBLEM — T07.XXXA ABRASIONS OF MULTIPLE SITES: Status: ACTIVE | Noted: 2020-08-30

## 2020-08-30 PROBLEM — S12.101A CLOSED NONDISPLACED FRACTURE OF SECOND CERVICAL VERTEBRA (HCC): Status: ACTIVE | Noted: 2020-08-30

## 2020-08-30 LAB
ABO GROUP BLD: NORMAL
ANION GAP SERPL CALCULATED.3IONS-SCNC: 4 MMOL/L (ref 4–13)
BASE EXCESS BLDA CALC-SCNC: 3 MMOL/L (ref -2–3)
BLD GP AB SCN SERPL QL: NEGATIVE
BUN SERPL-MCNC: 20 MG/DL (ref 5–25)
CA-I BLD-SCNC: 1.06 MMOL/L (ref 1.12–1.32)
CALCIUM SERPL-MCNC: 8.9 MG/DL (ref 8.3–10.1)
CHLORIDE SERPL-SCNC: 103 MMOL/L (ref 100–108)
CO2 SERPL-SCNC: 30 MMOL/L (ref 21–32)
CREAT SERPL-MCNC: 1.5 MG/DL (ref 0.6–1.3)
ERYTHROCYTE [DISTWIDTH] IN BLOOD BY AUTOMATED COUNT: 12 % (ref 11.6–15.1)
GFR SERPL CREATININE-BSD FRML MDRD: 61 ML/MIN/1.73SQ M
GLUCOSE SERPL-MCNC: 107 MG/DL (ref 65–140)
GLUCOSE SERPL-MCNC: 110 MG/DL (ref 65–140)
HCO3 BLDA-SCNC: 26.4 MMOL/L (ref 24–30)
HCT VFR BLD AUTO: 46.1 % (ref 36.5–49.3)
HCT VFR BLD CALC: 45 % (ref 36.5–49.3)
HGB BLD-MCNC: 16.4 G/DL (ref 12–17)
HGB BLDA-MCNC: 15.3 G/DL (ref 12–17)
INR PPP: 1.01 (ref 0.84–1.19)
MAGNESIUM SERPL-MCNC: 2.1 MG/DL (ref 1.6–2.6)
MCH RBC QN AUTO: 31.3 PG (ref 26.8–34.3)
MCHC RBC AUTO-ENTMCNC: 35.6 G/DL (ref 31.4–37.4)
MCV RBC AUTO: 88 FL (ref 82–98)
PCO2 BLD: 27 MMOL/L (ref 21–32)
PCO2 BLD: 36.5 MM HG (ref 42–50)
PH BLD: 7.47 [PH] (ref 7.3–7.4)
PLATELET # BLD AUTO: 252 THOUSANDS/UL (ref 149–390)
PMV BLD AUTO: 10.2 FL (ref 8.9–12.7)
PO2 BLD: 27 MM HG (ref 35–45)
POTASSIUM BLD-SCNC: 4.1 MMOL/L (ref 3.5–5.3)
POTASSIUM SERPL-SCNC: 4 MMOL/L (ref 3.5–5.3)
PROTHROMBIN TIME: 13.3 SECONDS (ref 11.6–14.5)
RBC # BLD AUTO: 5.24 MILLION/UL (ref 3.88–5.62)
RH BLD: POSITIVE
SAO2 % BLD FROM PO2: 56 % (ref 60–85)
SODIUM BLD-SCNC: 139 MMOL/L (ref 136–145)
SODIUM SERPL-SCNC: 137 MMOL/L (ref 136–145)
SPECIMEN EXPIRATION DATE: NORMAL
SPECIMEN SOURCE: ABNORMAL
WBC # BLD AUTO: 15.93 THOUSAND/UL (ref 4.31–10.16)

## 2020-08-30 PROCEDURE — 99223 1ST HOSP IP/OBS HIGH 75: CPT | Performed by: SURGERY

## 2020-08-30 PROCEDURE — 86900 BLOOD TYPING SEROLOGIC ABO: CPT | Performed by: STUDENT IN AN ORGANIZED HEALTH CARE EDUCATION/TRAINING PROGRAM

## 2020-08-30 PROCEDURE — 85014 HEMATOCRIT: CPT

## 2020-08-30 PROCEDURE — NC001 PR NO CHARGE: Performed by: SURGERY

## 2020-08-30 PROCEDURE — 90471 IMMUNIZATION ADMIN: CPT

## 2020-08-30 PROCEDURE — 90715 TDAP VACCINE 7 YRS/> IM: CPT | Performed by: PHYSICIAN ASSISTANT

## 2020-08-30 PROCEDURE — 84295 ASSAY OF SERUM SODIUM: CPT

## 2020-08-30 PROCEDURE — 83735 ASSAY OF MAGNESIUM: CPT | Performed by: PHYSICIAN ASSISTANT

## 2020-08-30 PROCEDURE — 73100 X-RAY EXAM OF WRIST: CPT

## 2020-08-30 PROCEDURE — 84132 ASSAY OF SERUM POTASSIUM: CPT

## 2020-08-30 PROCEDURE — 0PSHXZZ REPOSITION RIGHT RADIUS, EXTERNAL APPROACH: ICD-10-PCS | Performed by: ORTHOPAEDIC SURGERY

## 2020-08-30 PROCEDURE — 86850 RBC ANTIBODY SCREEN: CPT | Performed by: STUDENT IN AN ORGANIZED HEALTH CARE EDUCATION/TRAINING PROGRAM

## 2020-08-30 PROCEDURE — 85027 COMPLETE CBC AUTOMATED: CPT | Performed by: PHYSICIAN ASSISTANT

## 2020-08-30 PROCEDURE — 82803 BLOOD GASES ANY COMBINATION: CPT

## 2020-08-30 PROCEDURE — 73110 X-RAY EXAM OF WRIST: CPT

## 2020-08-30 PROCEDURE — NC001 PR NO CHARGE: Performed by: NEUROLOGICAL SURGERY

## 2020-08-30 PROCEDURE — 71045 X-RAY EXAM CHEST 1 VIEW: CPT

## 2020-08-30 PROCEDURE — 70496 CT ANGIOGRAPHY HEAD: CPT

## 2020-08-30 PROCEDURE — G1004 CDSM NDSC: HCPCS

## 2020-08-30 PROCEDURE — 74177 CT ABD & PELVIS W/CONTRAST: CPT

## 2020-08-30 PROCEDURE — 80048 BASIC METABOLIC PNL TOTAL CA: CPT | Performed by: PHYSICIAN ASSISTANT

## 2020-08-30 PROCEDURE — 73200 CT UPPER EXTREMITY W/O DYE: CPT

## 2020-08-30 PROCEDURE — 71260 CT THORAX DX C+: CPT

## 2020-08-30 PROCEDURE — 86901 BLOOD TYPING SEROLOGIC RH(D): CPT | Performed by: STUDENT IN AN ORGANIZED HEALTH CARE EDUCATION/TRAINING PROGRAM

## 2020-08-30 PROCEDURE — 99156 MOD SED OTH PHYS/QHP 5/>YRS: CPT | Performed by: EMERGENCY MEDICINE

## 2020-08-30 PROCEDURE — 96374 THER/PROPH/DIAG INJ IV PUSH: CPT

## 2020-08-30 PROCEDURE — 25605 CLTX DST RDL FX/EPHYS SEP W/: CPT | Performed by: SURGERY

## 2020-08-30 PROCEDURE — 72125 CT NECK SPINE W/O DYE: CPT

## 2020-08-30 PROCEDURE — 0PSHXZZ REPOSITION RIGHT RADIUS, EXTERNAL APPROACH: ICD-10-PCS | Performed by: SURGERY

## 2020-08-30 PROCEDURE — 85610 PROTHROMBIN TIME: CPT | Performed by: PHYSICIAN ASSISTANT

## 2020-08-30 PROCEDURE — 70486 CT MAXILLOFACIAL W/O DYE: CPT

## 2020-08-30 PROCEDURE — 70450 CT HEAD/BRAIN W/O DYE: CPT

## 2020-08-30 PROCEDURE — 70498 CT ANGIOGRAPHY NECK: CPT

## 2020-08-30 PROCEDURE — G0390 TRAUMA RESPONS W/HOSP CRITI: HCPCS

## 2020-08-30 PROCEDURE — 0PSJXZZ REPOSITION LEFT RADIUS, EXTERNAL APPROACH: ICD-10-PCS | Performed by: ORTHOPAEDIC SURGERY

## 2020-08-30 PROCEDURE — 99291 CRITICAL CARE FIRST HOUR: CPT

## 2020-08-30 PROCEDURE — NC001 PR NO CHARGE: Performed by: PHYSICIAN ASSISTANT

## 2020-08-30 PROCEDURE — 82330 ASSAY OF CALCIUM: CPT

## 2020-08-30 PROCEDURE — 82947 ASSAY GLUCOSE BLOOD QUANT: CPT

## 2020-08-30 RX ORDER — CEFAZOLIN SODIUM 2 G/50ML
2000 SOLUTION INTRAVENOUS
Status: CANCELLED | OUTPATIENT
Start: 2020-08-31 | End: 2020-09-01

## 2020-08-30 RX ORDER — PROPOFOL 10 MG/ML
80 INJECTION, EMULSION INTRAVENOUS ONCE
Status: COMPLETED | OUTPATIENT
Start: 2020-08-30 | End: 2020-08-30

## 2020-08-30 RX ORDER — FENTANYL CITRATE 50 UG/ML
1 INJECTION, SOLUTION INTRAMUSCULAR; INTRAVENOUS ONCE
Status: COMPLETED | OUTPATIENT
Start: 2020-08-30 | End: 2020-08-30

## 2020-08-30 RX ORDER — OXYCODONE HYDROCHLORIDE 10 MG/1
10 TABLET ORAL EVERY 4 HOURS PRN
Status: DISCONTINUED | OUTPATIENT
Start: 2020-08-30 | End: 2020-09-03 | Stop reason: HOSPADM

## 2020-08-30 RX ORDER — METHOCARBAMOL 750 MG/1
750 TABLET, FILM COATED ORAL EVERY 6 HOURS PRN
Status: DISCONTINUED | OUTPATIENT
Start: 2020-08-30 | End: 2020-09-01

## 2020-08-30 RX ORDER — ONDANSETRON 2 MG/ML
4 INJECTION INTRAMUSCULAR; INTRAVENOUS EVERY 6 HOURS PRN
Status: DISCONTINUED | OUTPATIENT
Start: 2020-08-30 | End: 2020-09-03 | Stop reason: HOSPADM

## 2020-08-30 RX ORDER — CHLORHEXIDINE GLUCONATE 0.12 MG/ML
15 RINSE ORAL EVERY 12 HOURS SCHEDULED
Status: DISCONTINUED | OUTPATIENT
Start: 2020-08-30 | End: 2020-08-31

## 2020-08-30 RX ORDER — SENNOSIDES 8.6 MG
2 TABLET ORAL DAILY
Status: DISCONTINUED | OUTPATIENT
Start: 2020-08-31 | End: 2020-09-03 | Stop reason: HOSPADM

## 2020-08-30 RX ORDER — LIDOCAINE HYDROCHLORIDE 10 MG/ML
30 INJECTION, SOLUTION EPIDURAL; INFILTRATION; INTRACAUDAL; PERINEURAL ONCE
Status: DISCONTINUED | OUTPATIENT
Start: 2020-08-30 | End: 2020-09-03 | Stop reason: HOSPADM

## 2020-08-30 RX ORDER — ACETAMINOPHEN 325 MG/1
650 TABLET ORAL EVERY 6 HOURS SCHEDULED
Status: DISCONTINUED | OUTPATIENT
Start: 2020-08-31 | End: 2020-09-01

## 2020-08-30 RX ORDER — SODIUM CHLORIDE, SODIUM GLUCONATE, SODIUM ACETATE, POTASSIUM CHLORIDE, MAGNESIUM CHLORIDE, SODIUM PHOSPHATE, DIBASIC, AND POTASSIUM PHOSPHATE .53; .5; .37; .037; .03; .012; .00082 G/100ML; G/100ML; G/100ML; G/100ML; G/100ML; G/100ML; G/100ML
125 INJECTION, SOLUTION INTRAVENOUS CONTINUOUS
Status: DISCONTINUED | OUTPATIENT
Start: 2020-08-30 | End: 2020-08-31

## 2020-08-30 RX ORDER — OXYCODONE HYDROCHLORIDE 5 MG/1
5 TABLET ORAL EVERY 4 HOURS PRN
Status: DISCONTINUED | OUTPATIENT
Start: 2020-08-30 | End: 2020-09-03 | Stop reason: HOSPADM

## 2020-08-30 RX ORDER — HYDROMORPHONE HCL/PF 1 MG/ML
1 SYRINGE (ML) INJECTION ONCE
Status: COMPLETED | OUTPATIENT
Start: 2020-08-30 | End: 2020-08-30

## 2020-08-30 RX ORDER — CEFAZOLIN SODIUM 1 G/50ML
1000 SOLUTION INTRAVENOUS
Status: DISCONTINUED | OUTPATIENT
Start: 2020-08-31 | End: 2020-09-01

## 2020-08-30 RX ORDER — FENTANYL CITRATE 50 UG/ML
50 INJECTION, SOLUTION INTRAMUSCULAR; INTRAVENOUS ONCE
Status: COMPLETED | OUTPATIENT
Start: 2020-08-30 | End: 2020-08-30

## 2020-08-30 RX ADMIN — FENTANYL CITRATE 50 MCG: 50 INJECTION INTRAMUSCULAR; INTRAVENOUS at 19:03

## 2020-08-30 RX ADMIN — HYDROMORPHONE HYDROCHLORIDE 1 MG: 1 INJECTION, SOLUTION INTRAMUSCULAR; INTRAVENOUS; SUBCUTANEOUS at 22:45

## 2020-08-30 RX ADMIN — SODIUM CHLORIDE, SODIUM GLUCONATE, SODIUM ACETATE, POTASSIUM CHLORIDE, MAGNESIUM CHLORIDE, SODIUM PHOSPHATE, DIBASIC, AND POTASSIUM PHOSPHATE 125 ML/HR: .53; .5; .37; .037; .03; .012; .00082 INJECTION, SOLUTION INTRAVENOUS at 22:40

## 2020-08-30 RX ADMIN — IOHEXOL 100 ML: 350 INJECTION, SOLUTION INTRAVENOUS at 19:19

## 2020-08-30 RX ADMIN — PROPOFOL 80 MG: 10 INJECTION, EMULSION INTRAVENOUS at 18:55

## 2020-08-30 RX ADMIN — TETANUS TOXOID, REDUCED DIPHTHERIA TOXOID AND ACELLULAR PERTUSSIS VACCINE, ADSORBED 0.5 ML: 5; 2.5; 8; 8; 2.5 SUSPENSION INTRAMUSCULAR at 18:54

## 2020-08-30 RX ADMIN — LEVETIRACETAM 500 MG: 100 INJECTION, SOLUTION INTRAVENOUS at 23:09

## 2020-08-30 NOTE — ASSESSMENT & PLAN NOTE
- Traumatic right-sided SAH  - Admit to Trauma service in ICU   - Neurosurgery Consult for evaluation and recommendations  Anticipate non-operative management  - Avoid all antiplatelet/anticoagulant medication   - Closely monitor neurologic exam every 1 hour   - Tentatively plan for repeat CT scan of the head tomorrow morning with CT scan of the temporal bone at the time for further evaluation of right-sided temporal bone fracture  Repeat CT head if change in neurologic exam and/or drop in GCS >2   - Initiate Keppra for seizure prophylaxis  - OT for cognitive evaluation
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
## 2020-08-30 NOTE — ASSESSMENT & PLAN NOTE
- Closed left distal radius fracture  - Maintain nonweightbearing status on right upper extremity   - Orthopedic Surgery consult for evaluation and recommendations  - Monitor neurovascular exam in right upper extremity   - Initiate multimodal analgesic regimen   - PT and OT evaluation and treatment as indicated when appropriate
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
## 2020-08-30 NOTE — ASSESSMENT & PLAN NOTE
- Closed right wrist fracture dislocation status post closed reduction and splinting under conscious sedation in trauma bay  - Maintain splint and nonweightbearing status on right upper extremity   - Orthopedic Surgery consult for evaluation and recommendations  - Monitor neurovascular exam in right upper extremity   - Initiate multimodal analgesic regimen   - PT and OT evaluation and treatment as indicated when appropriate
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
## 2020-08-30 NOTE — ASSESSMENT & PLAN NOTE
- Right facial / periorbital facial contusion  Vision grossly intact with symmetrical pupillary response  - Obtain CT scan of face  - Monitor swelling   - Ice 20 minutes every hour   - Multimodal analgesic regimen

## 2020-08-30 NOTE — ASSESSMENT & PLAN NOTE
- Concussion; unknown if there was loss of concusiousness   - Concussion protocol   - Monitor neurologic exam   - Treat symptoms as indicated  - OT for cognitive evaluation

## 2020-08-31 ENCOUNTER — ANESTHESIA EVENT (INPATIENT)
Dept: PERIOP | Facility: HOSPITAL | Age: 30
DRG: 500 | End: 2020-08-31
Payer: COMMERCIAL

## 2020-08-31 ENCOUNTER — APPOINTMENT (INPATIENT)
Dept: RADIOLOGY | Facility: HOSPITAL | Age: 30
DRG: 500 | End: 2020-08-31
Payer: COMMERCIAL

## 2020-08-31 PROBLEM — S15.009A INTERNAL CAROTID ARTERY INJURY: Status: ACTIVE | Noted: 2020-08-31

## 2020-08-31 PROBLEM — Z86.79 HISTORY OF CORONARY VASOSPASM: Status: ACTIVE | Noted: 2020-08-31

## 2020-08-31 PROBLEM — S06.4X9A TRAUMATIC EPIDURAL HEMATOMA (HCC): Status: ACTIVE | Noted: 2020-08-31

## 2020-08-31 PROBLEM — S06.4XAA TRAUMATIC EPIDURAL HEMATOMA (HCC): Status: ACTIVE | Noted: 2020-08-31

## 2020-08-31 LAB
ABO GROUP BLD: NORMAL
ABO GROUP BLD: NORMAL
ANION GAP SERPL CALCULATED.3IONS-SCNC: 6 MMOL/L (ref 4–13)
APTT PPP: 28 SECONDS (ref 23–37)
APTT PPP: 30 SECONDS (ref 23–37)
BLD GP AB SCN SERPL QL: NEGATIVE
BUN SERPL-MCNC: 19 MG/DL (ref 5–25)
CALCIUM SERPL-MCNC: 9 MG/DL (ref 8.3–10.1)
CHLORIDE SERPL-SCNC: 102 MMOL/L (ref 100–108)
CO2 SERPL-SCNC: 28 MMOL/L (ref 21–32)
CREAT SERPL-MCNC: 1.27 MG/DL (ref 0.6–1.3)
ERYTHROCYTE [DISTWIDTH] IN BLOOD BY AUTOMATED COUNT: 12.3 % (ref 11.6–15.1)
GFR SERPL CREATININE-BSD FRML MDRD: 75 ML/MIN/1.73SQ M
GLUCOSE SERPL-MCNC: 143 MG/DL (ref 65–140)
HCT VFR BLD AUTO: 43.2 % (ref 36.5–49.3)
HGB BLD-MCNC: 15.5 G/DL (ref 12–17)
INR PPP: 1.08 (ref 0.84–1.19)
MCH RBC QN AUTO: 31.4 PG (ref 26.8–34.3)
MCHC RBC AUTO-ENTMCNC: 35.9 G/DL (ref 31.4–37.4)
MCV RBC AUTO: 87 FL (ref 82–98)
PLATELET # BLD AUTO: 248 THOUSANDS/UL (ref 149–390)
PMV BLD AUTO: 10 FL (ref 8.9–12.7)
POTASSIUM SERPL-SCNC: 4.1 MMOL/L (ref 3.5–5.3)
PROTHROMBIN TIME: 14 SECONDS (ref 11.6–14.5)
RBC # BLD AUTO: 4.94 MILLION/UL (ref 3.88–5.62)
RH BLD: POSITIVE
RH BLD: POSITIVE
SODIUM SERPL-SCNC: 136 MMOL/L (ref 136–145)
SPECIMEN EXPIRATION DATE: NORMAL
WBC # BLD AUTO: 11.42 THOUSAND/UL (ref 4.31–10.16)

## 2020-08-31 PROCEDURE — 99255 IP/OBS CONSLTJ NEW/EST HI 80: CPT | Performed by: NEUROLOGICAL SURGERY

## 2020-08-31 PROCEDURE — 99233 SBSQ HOSP IP/OBS HIGH 50: CPT | Performed by: EMERGENCY MEDICINE

## 2020-08-31 PROCEDURE — 80048 BASIC METABOLIC PNL TOTAL CA: CPT | Performed by: STUDENT IN AN ORGANIZED HEALTH CARE EDUCATION/TRAINING PROGRAM

## 2020-08-31 PROCEDURE — 85730 THROMBOPLASTIN TIME PARTIAL: CPT | Performed by: PHYSICIAN ASSISTANT

## 2020-08-31 PROCEDURE — 70450 CT HEAD/BRAIN W/O DYE: CPT

## 2020-08-31 PROCEDURE — 85027 COMPLETE CBC AUTOMATED: CPT | Performed by: PHYSICIAN ASSISTANT

## 2020-08-31 PROCEDURE — 86901 BLOOD TYPING SEROLOGIC RH(D): CPT | Performed by: ORTHOPAEDIC SURGERY

## 2020-08-31 PROCEDURE — 86850 RBC ANTIBODY SCREEN: CPT | Performed by: ORTHOPAEDIC SURGERY

## 2020-08-31 PROCEDURE — NC001 PR NO CHARGE: Performed by: PHYSICIAN ASSISTANT

## 2020-08-31 PROCEDURE — 72141 MRI NECK SPINE W/O DYE: CPT

## 2020-08-31 PROCEDURE — G1004 CDSM NDSC: HCPCS

## 2020-08-31 PROCEDURE — 86900 BLOOD TYPING SEROLOGIC ABO: CPT | Performed by: ORTHOPAEDIC SURGERY

## 2020-08-31 PROCEDURE — 99255 IP/OBS CONSLTJ NEW/EST HI 80: CPT | Performed by: ORTHOPAEDIC SURGERY

## 2020-08-31 PROCEDURE — 70480 CT ORBIT/EAR/FOSSA W/O DYE: CPT

## 2020-08-31 PROCEDURE — 72040 X-RAY EXAM NECK SPINE 2-3 VW: CPT

## 2020-08-31 PROCEDURE — 85730 THROMBOPLASTIN TIME PARTIAL: CPT | Performed by: STUDENT IN AN ORGANIZED HEALTH CARE EDUCATION/TRAINING PROGRAM

## 2020-08-31 PROCEDURE — NC001 PR NO CHARGE: Performed by: ORTHOPAEDIC SURGERY

## 2020-08-31 PROCEDURE — 85610 PROTHROMBIN TIME: CPT | Performed by: PHYSICIAN ASSISTANT

## 2020-08-31 RX ORDER — HYDROMORPHONE HCL/PF 1 MG/ML
0.5 SYRINGE (ML) INJECTION
Status: DISCONTINUED | OUTPATIENT
Start: 2020-08-31 | End: 2020-09-03 | Stop reason: HOSPADM

## 2020-08-31 RX ORDER — TROPICAMIDE 10 MG/ML
1 SOLUTION/ DROPS OPHTHALMIC ONCE
Status: COMPLETED | OUTPATIENT
Start: 2020-08-31 | End: 2020-08-31

## 2020-08-31 RX ORDER — NEOMYCIN SULFATE, POLYMYXIN B SULFATE AND DEXAMETHASONE 3.5; 10000; 1 MG/ML; [USP'U]/ML; MG/ML
1 SUSPENSION/ DROPS OPHTHALMIC 4 TIMES DAILY
Status: DISCONTINUED | OUTPATIENT
Start: 2020-08-31 | End: 2020-09-03 | Stop reason: HOSPADM

## 2020-08-31 RX ORDER — DILTIAZEM HYDROCHLORIDE 240 MG/1
240 CAPSULE, COATED, EXTENDED RELEASE ORAL DAILY
Status: DISCONTINUED | OUTPATIENT
Start: 2020-08-31 | End: 2020-09-03 | Stop reason: HOSPADM

## 2020-08-31 RX ORDER — HYDROMORPHONE HCL/PF 1 MG/ML
1 SYRINGE (ML) INJECTION
Status: DISCONTINUED | OUTPATIENT
Start: 2020-08-31 | End: 2020-08-31

## 2020-08-31 RX ORDER — HYDRALAZINE HYDROCHLORIDE 20 MG/ML
5 INJECTION INTRAMUSCULAR; INTRAVENOUS EVERY 6 HOURS PRN
Status: DISCONTINUED | OUTPATIENT
Start: 2020-08-31 | End: 2020-09-03 | Stop reason: HOSPADM

## 2020-08-31 RX ORDER — DILTIAZEM HYDROCHLORIDE 240 MG/1
240 CAPSULE, EXTENDED RELEASE ORAL DAILY
COMMUNITY
End: 2020-09-28

## 2020-08-31 RX ADMIN — TROPICAMIDE 1 DROP: 10 SOLUTION/ DROPS OPHTHALMIC at 10:54

## 2020-08-31 RX ADMIN — HYDROMORPHONE HYDROCHLORIDE 0.5 MG: 1 INJECTION, SOLUTION INTRAMUSCULAR; INTRAVENOUS; SUBCUTANEOUS at 20:00

## 2020-08-31 RX ADMIN — SODIUM CHLORIDE, SODIUM GLUCONATE, SODIUM ACETATE, POTASSIUM CHLORIDE, MAGNESIUM CHLORIDE, SODIUM PHOSPHATE, DIBASIC, AND POTASSIUM PHOSPHATE 125 ML/HR: .53; .5; .37; .037; .03; .012; .00082 INJECTION, SOLUTION INTRAVENOUS at 15:44

## 2020-08-31 RX ADMIN — ONDANSETRON 4 MG: 2 INJECTION INTRAMUSCULAR; INTRAVENOUS at 02:39

## 2020-08-31 RX ADMIN — DILTIAZEM HYDROCHLORIDE 240 MG: 240 CAPSULE, COATED, EXTENDED RELEASE ORAL at 15:39

## 2020-08-31 RX ADMIN — NEOMYCIN SULFATE, POLYMYXIN B SULFATE AND DEXAMETHASONE 1 DROP: 3.5; 10000; 1 SUSPENSION OPHTHALMIC at 17:33

## 2020-08-31 RX ADMIN — HYDROMORPHONE HYDROCHLORIDE 1 MG: 1 INJECTION, SOLUTION INTRAMUSCULAR; INTRAVENOUS; SUBCUTANEOUS at 11:02

## 2020-08-31 RX ADMIN — ACETAMINOPHEN 650 MG: 325 TABLET, FILM COATED ORAL at 17:31

## 2020-08-31 RX ADMIN — HYDROMORPHONE HYDROCHLORIDE 1 MG: 1 INJECTION, SOLUTION INTRAMUSCULAR; INTRAVENOUS; SUBCUTANEOUS at 14:43

## 2020-08-31 RX ADMIN — CARBAMIDE PEROXIDE 6.5% 5 DROP: 6.5 LIQUID AURICULAR (OTIC) at 15:39

## 2020-08-31 RX ADMIN — HYDROMORPHONE HYDROCHLORIDE 1 MG: 1 INJECTION, SOLUTION INTRAMUSCULAR; INTRAVENOUS; SUBCUTANEOUS at 02:34

## 2020-08-31 RX ADMIN — LEVETIRACETAM 500 MG: 100 INJECTION, SOLUTION INTRAVENOUS at 21:06

## 2020-08-31 RX ADMIN — NEOMYCIN SULFATE, POLYMYXIN B SULFATE AND DEXAMETHASONE 1 DROP: 3.5; 10000; 1 SUSPENSION OPHTHALMIC at 15:39

## 2020-08-31 RX ADMIN — CHLORHEXIDINE GLUCONATE 0.12% ORAL RINSE 15 ML: 1.2 LIQUID ORAL at 08:52

## 2020-08-31 RX ADMIN — LEVETIRACETAM 500 MG: 100 INJECTION, SOLUTION INTRAVENOUS at 08:53

## 2020-08-31 RX ADMIN — HYDROMORPHONE HYDROCHLORIDE 1 MG: 1 INJECTION, SOLUTION INTRAMUSCULAR; INTRAVENOUS; SUBCUTANEOUS at 06:20

## 2020-08-31 RX ADMIN — ACETAMINOPHEN 650 MG: 325 TABLET, FILM COATED ORAL at 12:23

## 2020-08-31 RX ADMIN — OXYCODONE HYDROCHLORIDE 10 MG: 10 TABLET ORAL at 17:36

## 2020-08-31 RX ADMIN — OXYCODONE HYDROCHLORIDE 10 MG: 10 TABLET ORAL at 20:00

## 2020-08-31 RX ADMIN — TROPICAMIDE 1 DROP: 10 SOLUTION/ DROPS OPHTHALMIC at 10:20

## 2020-08-31 RX ADMIN — SODIUM CHLORIDE, SODIUM GLUCONATE, SODIUM ACETATE, POTASSIUM CHLORIDE, MAGNESIUM CHLORIDE, SODIUM PHOSPHATE, DIBASIC, AND POTASSIUM PHOSPHATE 125 ML/HR: .53; .5; .37; .037; .03; .012; .00082 INJECTION, SOLUTION INTRAVENOUS at 07:27

## 2020-08-31 RX ADMIN — CHLORHEXIDINE GLUCONATE 0.12% ORAL RINSE 15 ML: 1.2 LIQUID ORAL at 01:37

## 2020-08-31 RX ADMIN — METHOCARBAMOL TABLETS 750 MG: 750 TABLET, COATED ORAL at 12:42

## 2020-08-31 RX ADMIN — OXYCODONE HYDROCHLORIDE 5 MG: 5 TABLET ORAL at 12:24

## 2020-08-31 NOTE — ASSESSMENT & PLAN NOTE
- Closed left distal radius fracture  - Maintain nonweightbearing status on right upper extremity   - Orthopedic Surgery consult   - OR for ORIF 9/1  - Monitor neurovascular exam in right upper extremity   - Initiate multimodal analgesic regimen   - PT and OT evaluation and treatment as indicated when appropriate

## 2020-08-31 NOTE — PROGRESS NOTES
Daily Progress Note - BETITO SPARKS  [address] 31 y o  male MRN: [id number]  Unit/Bed#: ICU 07 Encounter: [id number]        ----------------------------------------------------------------------------------------  HPI/24hr events:   MRI C-spine obtained overnight due to possible epidural hematoma  ---------------------------------------------------------------------------------------  SUBJECTIVE  This morning, pt remains confused with delayed verbal responses, GCS of 14  Review of Systems   Constitutional: Negative for activity change, appetite change, chills and fever  HENT: Negative for congestion, sinus pressure and sinus pain  Posterior neck pain   Respiratory: Negative for apnea, cough, chest tightness, shortness of breath and wheezing  Cardiovascular: Negative for chest pain, palpitations and leg swelling  Gastrointestinal: Negative for abdominal distention, abdominal pain, constipation, diarrhea, nausea and vomiting  Genitourinary: Negative for difficulty urinating and dysuria  Musculoskeletal: Negative for arthralgias, back pain and gait problem  R wrist pain   Skin: Negative for color change, pallor, rash and wound  Neurological: Negative for dizziness, tremors, seizures, syncope, facial asymmetry and numbness  Psychiatric/Behavioral: Negative for agitation, behavioral problems and confusion      ---------------------------------------------------------------------------------------  Assessment and Plan:    Neuro:   · Diagnosis: SAH, nondisplaced C1 fx, nondisplaced Type II dens fx, L1-L3 transverse process fx  ?  Plan:   § NSGY consulted  § q1h neuro checks  § Goal SBP <160  § F/u repeat CTH, CT temporal bones tomorrow AM  § Immediate repeat CTH if mental status acutely worsens  § Keppra 500mg IV BID for seizure ppx  § Cervical spine precautions, C-collar in place  § Lumbar spine precautions  · Diagnosis: CTA H/N showing 2 possible areas of intimal R ICA injury (1 cervical, 1 near cavernous segment), possible epidural hematoma from base of clivus to C2-3  ? Plan:   Rusty Fillers following  § F/u MRI C-spine    · Diagnosis: R orbital wall fx  ? Plan:   § Ophthalmology consulted--Dilate both eyes with 1% Mydriacyl at 9:30am and 10am tomorrow    CV:   · Diagnosis: Hx of coronary vasospasm, diagnosed on prior cardiac catheterization   ? Plan:   § Continue home Diltiazem    Pulm:  · No acute issues  · Keep SpO2>92%  · IS, pulmonary toilet    GI:   · No acute issues  · NPO      :   · Strict I/Os  · Trend UOP    F/E/N:   · Plan:   ? Fluids: Isolyte @125  ? Replete electrolytes PRN  ? Nutrition: NPO      Heme/Onc:   ? No acute issues  ? DVT ppx: pharmacologic held d/t acute SAH, continue SCDs     Endo:   · No acute issues  · Trend blood glucose      ID:   ? No acute issues  ? Monitor fever curve, trend WBC count      MSK/Skin:   · Diagnosis: Multiple facial fx--R lateral maxillary wall, R lateral sphenoid wall, R frontal bone fx, R inferior temporal bone fx  ? Plan:   § OMFS consulted--non-operative management  § Continue sinus precautions, f/u outpatient in 1 week from discharge date  · Diagnosis: Bilateral distal radius fx, R displaced ulnar styloid fx  ? Plan:   § Orthopedics following, likely OR today for ORIF  § NWB bilateral upper extremities  § PT/OT    Disposition: Continue Critical Care   Code Status: Level 1 - Full Code  ---------------------------------------------------------------------------------------  ICU CORE MEASURES    Prophylaxis   VTE Pharmacologic Prophylaxis: Pharmacologic VTE Prophylaxis contraindicated due to [facility]  VTE Mechanical Prophylaxis: sequential compression device  Stress Ulcer Prophylaxis: Prophylaxis Not Indicated     ABCDE Protocol (if indicated)  Plan to perform spontaneous awakening trial today? Not applicable  Plan to perform spontaneous breathing trial today? Not applicable  Obvious barriers to extubation?  Not applicable  CAM-ICU: Negative    Invasive Devices Review  Invasive Devices     Peripheral Intravenous Line            Peripheral IV 20 Left Antecubital less than 1 day    Peripheral IV 20 Left Foot less than 1 day    Peripheral IV 20 Right Arm less than 1 day              Can any invasive devices be discontinued today? No  ---------------------------------------------------------------------------------------  OBJECTIVE    Vitals   Vitals:    20 0215 20 0315 20 0415 20 0600   BP: 159/72 151/83 141/78 138/83   Pulse: 80 78 100 78   Resp: 12 (!) 10 14 12   Temp:       TempSrc:       SpO2: 95% 98% 97% 96%   Weight:       Height:         Temp (24hrs), Av 5 °F (36 9 °C), Min:98 2 °F (36 8 °C), Max:98 8 °F (37 1 °C)  Current: Temperature: 98 8 °F (37 1 °C)  /83   Pulse 78   Temp 98 8 °F (37 1 °C) (Oral)   Resp 12   Ht 5' 7" (1 702 m)   Wt 70 9 kg (156 lb 4 9 oz)   SpO2 96%   BMI 24 48 kg/m²     Respiratory:  SpO2: SpO2: 96 %       Invasive/non-invasive ventilation settings   Respiratory    Lab Data (Last 4 hours)    None         O2/Vent Data (Last 4 hours)    None              Physical Exam  Constitutional:       General: He is not in acute distress  Appearance: He is well-developed  He is not diaphoretic  HENT:      Head: Normocephalic  Eyes:      Comments: R periorbital ecchymosis   Neck:      Comments: C-collar in place  Cardiovascular:      Rate and Rhythm: Normal rate and regular rhythm  Pulmonary:      Effort: Pulmonary effort is normal  No respiratory distress  Breath sounds: Normal breath sounds  No stridor  No wheezing  Abdominal:      General: There is no distension  Palpations: Abdomen is soft  Tenderness: There is no abdominal tenderness  Musculoskeletal:      Comments: Bilateral upper extremities immobilized in splints   Skin:     General: Skin is warm  Neurological:      Mental Status: He is alert and oriented to person, place, and time  Laboratory and Diagnostics:  Results from last 7 days   Lab Units 08/31/20  0504 08/30/20 2136 08/30/20  1850   WBC Thousand/uL 11 42* 15 93*  --    HEMOGLOBIN g/dL 15 5 16 4  --    I STAT HEMOGLOBIN g/dl  --   --  15 3   HEMATOCRIT % 43 2 46 1  --    HEMATOCRIT, ISTAT %  --   --  45   PLATELETS Thousands/uL 248 252  --      Results from last 7 days   Lab Units 08/31/20  0504 08/30/20 2137 08/30/20  1850   SODIUM mmol/L 136 137  --    POTASSIUM mmol/L 4 1 4 0  --    CHLORIDE mmol/L 102 103  --    CO2 mmol/L 28 30  --    CO2, I-STAT mmol/L  --   --  27   ANION GAP mmol/L 6 4  --    BUN mg/dL 19 20  --    CREATININE mg/dL 1 27 1 50*  --    CALCIUM mg/dL 9 0 8 9  --    GLUCOSE RANDOM mg/dL 143* 107  --      Results from last 7 days   Lab Units 08/30/20  2137   MAGNESIUM mg/dL 2 1      Results from last 7 days   Lab Units 08/31/20  0504 08/30/20  2140   INR   --  1 01   PTT seconds 28  --               ABG:    VBG:          Micro        Imaging:     CT upper extremity wo contrast right   Final Result by Christiane Cuadra MD (08/30 2014)      1  Comminuted intra-articular fracture of the distal radius  2   Comminuted and displaced fracture of the ulnar styloid  3   Palmar subluxation of the carpal bones with respect of the distal radius  Workstation performed: [id number]         CT facial bones wo contrast   Final Result by Kenney Lagunas MD (08/30 2003)      Multiple right-sided facial bone fractures, and nondisplaced fractures of the right frontal and right temporal bones  I personally discussed this study with Leeann Canseco on 8/30/2020 at 7:57 PM          Workstation performed: [id number]         TRAUMA - CT head wo contrast   Final Result by Kenney Lagunas MD (08/30 2003)      Subarachnoid hemorrhage in the anterior interhemispheric fissure and in the right sylvian fissure  No significant mass effect or midline shift         Nondisplaced fractures of the right lateral orbital wall, right lateral maxillary wall and right lateral sphenoid wall, with associated hematoma in the right maxillary and right sphenoid sinuses  Fracture line also extends into the right frontal bone  Nondisplaced hairline fracture of the right inferior temporal bone  I personally discussed this study with Aliyah Rojo on 8/30/2020 at 7:57 PM                            Workstation performed: [id number]         TRAUMA - CT spine cervical wo contrast   Final Result by Shaan Esquivel MD (08/30 1957)      Nondisplaced type II dens fracture  Nondisplaced fracture at the inferior margin of the right lateral mass of C1  I personally discussed this study with Aliyah Rojo on 8/30/2020 at 7:57 PM          Workstation performed: [id number]         TRAUMA - CT chest abdomen pelvis w contrast   Final Result by Shaan Esquivel MD (08/30 2003)      No acute pathology in the chest       No evidence of solid organ injury  Nondisplaced fractures of the right transverse processes of L1-L3  I personally discussed this study with Aliyah Rojo on 8/30/2020 at 7:57 PM             Workstation performed: [id number]         CTA head and neck w wo contrast   Final Result by Cindy Bañuelos MD (08/30 2144)         1  Punctate eccentric filling defect in the right internal carotid artery cervical segment at the C2 level, adjacent to fractures, possibly representing focal intimal injury without evidence of dissection  Similar lesion in the cavernous segment of the    right ICA, adjacent to the sphenoid sinus fracture, also suggestive of focal intimal injury without dissection  2   No evidence of vertebral artery dissection, intimal injury or occlusion  3   No stenosis, dissection or occlusion of the major vessels of the [place]  Patent dural venous sinuses and deep cerebral veins  4   Extensive right facial, skull base and cervical spine fractures as described, stable     5   Possible small ventral epidural hematoma from the base of the clivus to the C2-3 level  MRI of the cervical spine suggested  The study was marked in [facility] for immediate notification  Workstation performed: [id number]         XR Trauma multiple    (Results Pending)   XR wrist 2 vw left    (Results Pending)   XR chest 1 view    (Results Pending)   XR wrist 2 vw left    (Results Pending)   XR wrist 3+ vw left    (Results Pending)   CT head wo contrast    (Results Pending)   CT orbits/temporal bones/skull base wo contrast    (Results Pending)   XR wrist 2 vw right    (Results Pending)   XR wrist 2 vw left    (Results Pending)   MRI cervical spine wo contrast    (Results Pending)         Intake and Output  I/O       08/29 0701 - 08/30 0700 08/30 0701 - 08/31 0700    P  O   0    I V  (mL/kg)  902 1 (12 7)    IV Piggyback  100    Total Intake(mL/kg)  1002 1 (14 1)    Urine (mL/kg/hr)  1000    Total Output  1000    Net  +2 1              Height and Weights   Height: 5' 7" (170 2 cm)  IBW: 66 1 kg  Body mass index is 24 48 kg/m²  Weight (last 2 days)     Date/Time   Weight    08/30/20 2200   70 9 (156 31)    08/30/20 1851   80 7 (177 91)                Nutrition       Diet Orders   (From admission, onward)             Start     Ordered    08/30/20 2003  Diet NPO; Sips with meds  Diet effective now     Question Answer Comment   Diet Type NPO    NPO Except: Sips with meds    RD to adjust diet per protocol?  Yes        08/30/20 2029                  Active Medications  Scheduled Meds:  Current Facility-Administered Medications   Medication Dose Route Frequency Provider Last Rate    acetaminophen  650 mg Oral Q6H [facility] Ba Amaro MD      cefazolin  1,000 mg Intravenous On Call To OR Benitez Huitron MD      chlorhexidine  15 mL [facility] Brody Stuart [place]      HYDROmorphone  1 mg Intravenous Q3H PRN Benitez Huitron MD      levETIRAcetam  500 mg Intravenous BID Brody Stuart PA-C Stopped (08/31/20 0000)  lidocaine (PF)  30 mL Infiltration Once Gwendlyn Cones, PA-LEON      lidocaine (PF)  30 mL Infiltration Once Gwendlyn Cones, CHANG      methocarbamol  750 mg Oral Q6H PRN Dinorah Rod, MD      multi-electrolyte  125 mL/hr Intravenous Continuous Gwendlyn Cones, PAElsaC 125 mL/hr (08/30/20 2240)    ondansetron  4 mg Intravenous Q6H PRN Gwendlyn Cones, CHANG      oxyCODONE  10 mg Oral Q4H PRN Ferdie Blanks, MD      oxyCODONE  5 mg Oral Q4H PRN Ferdie Blanks, MD      senna  2 tablet Oral Daily Jarrett Ayoub PA-C       Continuous Infusions:  multi-electrolyte, 125 mL/hr, Last Rate: 125 mL/hr (08/30/20 2240)      PRN Meds:   HYDROmorphone, 1 mg, Q3H PRN  methocarbamol, 750 mg, Q6H PRN  ondansetron, 4 mg, Q6H PRN  oxyCODONE, 10 mg, Q4H PRN  oxyCODONE, 5 mg, Q4H PRN        Allergies   No Known Allergies  ---------------------------------------------------------------------------------------  Advance Directive and Living Will:      Power of :    POLST:    ---------------------------------------------------------------------------------------    Xenia [place] MD      Portions of the record may have been created with voice recognition software  Occasional wrong word or "sound a like" substitutions may have occurred due to the inherent limitations of voice recognition software    Read the chart carefully and recognize, using context, where substitutions have occurred
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
## 2020-08-31 NOTE — PROCEDURES
Orthopedic injury treatment    Date/Time: 8/30/2020 8:33 PM  Performed by: Darshan Burch PA-C  Authorized by: Darshan Burch PA-C     Patient location: [address]  Procedure performed by consultant: Dr Mario Acevedo  Emergent situation    Relevant documents present and verified: Yes    Test results available and properly labeled: Yes    Site marked: Yes    Radiology Images displayed and confirmed  If images not available, report reviewed: Yes    Required items: Required blood products, implants, devices and special equipment available    Patient identity confirmed:  Verbally with patient, arm band and provided demographic data  Time out: Immediately prior to the procedure a time out was called    Injury location:  Wrist  Location details:  Right wrist  Injury type:  Fracture-dislocation  Neurovascular status: Neurovascularly intact    Distal perfusion: normal    Neurological function: normal    Range of motion: reduced    Local anesthesia used?: No    General anesthesia used?: No    Sedation type: Moderate (conscious) sedation (See separate Procedural Sedation form)  Immobilization:  Splint and ace wrap  Splint type:  Volar short arm  Supplies used:  Ortho-Glass  Neurovascular status: Neurovascularly intact    Distal perfusion: normal    Neurological function: normal    Patient tolerance:  Patient tolerated the procedure well with no immediate complications   Patient with dislocated fracture of R wrist verified by obvious deformity and XR R wrist, sedation provided by ED physician please see separate documentation  R wrist was reduced in typical fashion  Volar splint placed  Patient tolerated procedure well  Dara Soulier
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
## 2020-08-31 NOTE — ASSESSMENT & PLAN NOTE
- Multiple facial fractures as follows: There is a nondisplaced fracture of the lateral right maxillary wall extending to the right lateral orbital wall  This fracture also extends into the right sphenoid sinus with fluid opacification of the sinus consistent with hematoma  Additionally, the right-sided facial fractures extending to the right frontal bone with sinus involvement  - Neurosurgery, OMS, and Ophthalmology consults placed for evaluation and recommendations treated anticipate nonoperative management  - Monitor neurological exam   - Initially multimodal analgesic regimen    - Ice for swelling   - Per Ophthalmology, plan for further evaluation on August 31, 2020 with recommendations given for eye drops prior to the arrival

## 2020-08-31 NOTE — ASSESSMENT & PLAN NOTE
- There is a nondisplaced talar fracture of the right inferior temporal bone  - Neurosurgery, OMS, and Ophthalmology consults placed for evaluation and recommendations treated anticipate nonoperative management  - Plan for temporal bone CT scan at that time a repeat CT scan of the head on August 31, 2020   - Multimodal analgesic regimen  - Will likely need outpatient ENT follow-up for audiology evaluation

## 2020-08-31 NOTE — OCCUPATIONAL THERAPY NOTE
OT CANCEL NOTE    OT orders received  Chart reviewed  Pt is planned for the OR today for fixation of B/L distal radii fractures  Will hold initial OT evaluation  Will continue to follow pt on caseload and see pt when medically stable and as clinically appropriate, post-op      Kianna Hahn MS, OTR/L

## 2020-08-31 NOTE — TELEMEDICINE
Progress Note - Neurosurgery   Louetta Barthel 31 y o  male MRN: [id number]  Unit/Bed#: ED 27 Encounter: [id number]    Assessment/Plan:    · [facility], by report GCS 14  · tSAH without mass effect  Do not require neurosurgical intervention  Hold pharm VTE ppx until a f/u CTh done to show stability, if needed  Consider 7 days of AED (e g  Keppra 500 mg BID)  · Non-depressed skull fractures, require no intevention  · Type 2 dens fracture and right C1 lateral mass fracture  Both without displacement or subluxation  Treat in Vista collar  Obtain upright CSpine Xrays when able (ordered) to confirm stability  · Frequent neuro checks  · Full consult to follow    Objective:     Vitals: Blood pressure (!) 153/101, pulse 63, temperature 98 2 °F (36 8 °C), resp  rate 18, weight 80 7 kg (177 lb 14 6 oz), SpO2 98 %  ,There is no height or weight on file to calculate BMI

## 2020-08-31 NOTE — ASSESSMENT & PLAN NOTE
- Closed, nondisplaced fractures of the right transverse processes of L1, L2 and L3   - Conservative management with bracing as needed for pain  - Initially multimodal analgesic regimen   - PT and OT evaluation and treatment as indicated when appropriate
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
## 2020-08-31 NOTE — DISCHARGE INSTRUCTIONS
Oral Maxillofacial Surgery Discharge Instructions:  - Sinus Precautions: No blowing nose, sneeze with mouth open, no bearing down forcefully, no strenuous lifting and/or exercise  - Outpatient follow up with OMS in 1 week  Please call for an appointment at [phone]  Neurosurgery discharge instructions following traumatic head bleed:      Do not take any blood thinning medications (ie  No Advil  No motrin  No ibuprofen  No Aleve  No Aspirin  No fishoil  No heparin  No antiplatelet / no anticoagulation medication)   Refrain from activity that increases chance of trauma to head or falls  Recommend you take fall precaution   No strenuous activity or sports   Return to hospital Emergency Room if you experience worsening / new headache, nausea/vomiting, speech/vision change, seizure, confusion / mental status change, weakness, or other neurological changes  Follow-up as scheduled with a repeat CT head without contrast to be completed 2-3 days prior to visit  Prescription has been entered electronically  Please call  to schedule  Neurosurgery discharge instructions following neck fracture:      VISTA collar at all times except for showering change to dominga (peach) collar   No bending, twisting or heavy lifting  No pushing or pulling over 10lbs  No strenuous activities  NO DRIVING  **Please notify MD immediately if you have increased neck or arm pain  New numbness and/or weakness in your arm  Difficulty swallowing or breathing especially while lying down  Numbness or weakness in arms or legs   Increased difficulty walking **    Discharge Instructions - Orthopedics  Louetta Barthel 31 y o  male MRN: [id number]  Unit/Bed#: [facility] 629-01    Weight Bearing Status:                                           Nonweightbearing bilateral upper extremity in splints    Pain:  Per primary team    Dressing Instructions:   Please keep clean, dry and intact until follow up     Appt Instructions: If you do not have your appointment, please call the clinic at [phone] t to schedule follow-up appointment in 1 week with Dr Ellyn Tristan  Otherwise followup as scheduled     Contact the office sooner if you experience any increased numbness/tingling in the extremities        Miscellaneous:  None

## 2020-08-31 NOTE — PHYSICAL THERAPY NOTE
Physical Therapy Cancellation Note    PT orders received  CHart review completed  Pt pending OR today with ortho for B/L wrist fx  PT will follow and eval as medically appropriate      Casey Aleman, PT
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
## 2020-08-31 NOTE — ASSESSMENT & PLAN NOTE
- Closed right wrist fracture dislocation status post closed reduction and splinting under conscious sedation in trauma bay  - Maintain splint and nonweightbearing status on right upper extremity   - OR with ortho for ORIF 9/1   - Monitor neurovascular exam in right upper extremity   - Initiate multimodal analgesic regimen   - PT and OT evaluation and treatment as indicated when appropriate

## 2020-08-31 NOTE — ASSESSMENT & PLAN NOTE
- Multiple facial fractures as follows: There is a nondisplaced fracture of the lateral right maxillary wall extending to the right lateral orbital wall  This fracture also extends into the right sphenoid sinus with fluid opacification of the sinus consistent with hematoma  Additionally, the right-sided facial fractures extending to the right frontal bone with sinus involvement  - Optho consulted -  Maxitrol drops x 10 days   - Monitor neurological exam   - Initially multimodal analgesic regimen  - Ice for swelling

## 2020-08-31 NOTE — ASSESSMENT & PLAN NOTE
- Nondisplaced C1 lateral mass fracture  Patient was neurological intact and moving all four extremities  - Neurosurgery consult for evaluation and recommendations  Anticipate non operative management  - CTA of the head and neck without evidence of vascular injury   - Maintain cervical collar at all times in order Vista collar  - Obtain upright cervical spine x-rays once Vista collar placed  - Monitor neurologic exam   - Initially multimodal analgesic regimen   - PT and OT evaluation extremities indicated
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
## 2020-08-31 NOTE — PROGRESS NOTES
Progress Note - Orthopedics   Vinod Haddad 31 y o  male MRN: [id number]  Unit/Bed#: ICU 7-01      Subjective:    31 y o male with bilateral distal radius fractures  No acute events, no complaints  Pt doing well  He states that he has pain in bilateral wrists L> R , but that his pain is controlled   Denies fevers chills    Labs:  0   Lab Value Date/Time    HCT 43 2 08/31/2020 0504    HCT 46 1 08/30/2020 2136    HCT 45 08/30/2020 1850    HGB 15 5 08/31/2020 0504    HGB 16 4 08/30/2020 2136    HGB 15 3 08/30/2020 1850    INR 1 01 08/30/2020 2140    WBC 11 42 (H) 08/31/2020 0504    WBC 15 93 (H) 08/30/2020 2136       Meds:    Current Facility-Administered Medications:     acetaminophen (TYLENOL) tablet 650 mg, 650 mg, Oral, Q6H [address], Barron Velez MD    ceFAZolin (ANCEF) IVPB (premix) 1,000 mg 50 mL, 1,000 mg, Intravenous, On Call To OR, Margaret Roach MD    chlorhexidine (PERIDEX) 0 12 % oral rinse 15 mL, 15 mL, Swish & Arnold, Q12H [address], Jarrett Ayoub PA-C, 15 mL at 08/31/20 1114    HYDROmorphone (DILAUDID) injection 1 mg, 1 mg, Intravenous, Q3H PRN, Margaret Roach MD, 1 mg at 08/31/20 0234    levETIRAcetam (KEPPRA) 500 mg in sodium chloride 0 9 % 100 mL IVPB, 500 mg, Intravenous, BID, Sanchez Tyson PA-C, Stopped at 08/31/20 0000    lidocaine (PF) (XYLOCAINE-MPF) 1 % injection 30 mL, 30 mL, Infiltration, Once, Jarrett Ayoub PA-C    lidocaine (PF) (XYLOCAINE-MPF) 1 % injection 30 mL, 30 mL, Infiltration, Once, Jarrett Ayoub PA-C    methocarbamol (ROBAXIN) tablet 750 mg, 750 mg, Oral, Q6H PRN, Barron Velez MD    multi-electrolyte (PLASMALYTE-A/ISOLYTE-S PH 7 4) IV solution, 125 mL/hr, Intravenous, Continuous, Jarrett Ayoub PA-C, Last Rate: 125 mL/hr at 08/30/20 2240, 125 mL/hr at 08/30/20 2240    ondansetron (ZOFRAN) injection 4 mg, 4 mg, Intravenous, Q6H PRN, Sanchez Tyson PA-C, 4 mg at 08/31/20 0239    oxyCODONE (ROXICODONE) immediate release tablet 10 mg, 10 mg, Oral, Q4H PRN, Barron Velez, MD    oxyCODONE (ROXICODONE) IR tablet 5 mg, 5 mg, Oral, Q4H PRN, Florentnio Blanton MD    [facility]) tablet 17 2 mg, 2 tablet, Oral, Daily, Jarrett Ayoub PA-C    Blood Culture:   No results found for: BLOODCX    Wound Culture:   No results found for: WOUNDCULT    Ins and Outs:  I/O last 24 hours: In: 768 8 [I V :668 8; IV Piggyback:100]  Out: 1000 [Urine:1000]          Physical:  Vitals:    08/31/20 0415   BP: 141/78   Pulse: 100   Resp: 14   Temp:    SpO2: 97%     Musculoskeletal: bilateral Upper Extremity  · Dressing c/d/i  · TTP bilateral wrists  · SILT m/r/u  Motor intact ain/pin/m/r/u, 2+ radial pulse     Assessment:    30 y o male with bilateral distal radius fractures  Plan for operative fixation today of bilateral distal radii  Plan:  · NWB B/L UE in sugartong splints  · Continue to monitor for acute blood loss anemia    · PT/OT  · Pain control  · DVT ppx on hold for OR  · Dispo: Ortho will follow    Vin Neff MD
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
## 2020-08-31 NOTE — TRAUMA DOCUMENTATION
Ortho at bedside to reduce bilateral wrists  Per provider, established PIV access has to be removed so splints can be placed  Spoke with trauma, EJ access not possible secondary to c-collar  Lower extremity appropriate for temporary PIV access per trauma team, to enter order confirming  Heat pack in place on foot, will attempt PIV access shortly  Pt and family at bedside aware
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
## 2020-08-31 NOTE — ASSESSMENT & PLAN NOTE
Right lateral mass C1 fracture, type 2 dens fracture  · C/o neck pain, no true radicular symptoms at this time    Imaging:  · CT cervical spine w/o, 8/30/2020: Nondisplaced type II dens fracture  Nondisplaced fracture at the inferior margin of the right lateral mass of C1   · MRI cervical spine w/o, 8/31/2020: Findings consistent with C1 and C2 fracture with a small epidural hematoma extending from the posterior fossa to the inferior C3 level  There is midline fissuring along the ligamentum flavum throughout the cervical spine without subluxation      Plan:  · Continue VISTA collar at all times, [place] to shower  · Upright cervical xrays in collar  · Frequent neuro checks  · PT/OT evaluation - please keep patient in collar  · Cervical spine precautions  · Medical management and pain control per primary team  · No neurosurgical intervention is anticipated  · CTA head and neck tomorrow after OR  · Start on heparin gtt tomorrow after OR

## 2020-08-31 NOTE — ASSESSMENT & PLAN NOTE
- Nondisplaced C2, type II dens fracture  Patient was neurological intact and moving all four extremities  - Neurosurgery consult for evaluation and recommendations  Anticipate non operative management  - CTA of the head and neck without evidence of vascular injury   - Maintain cervical collar at all times in order Vista collar  - Obtain upright cervical spine x-rays once Vista collar placed  - Monitor neurologic exam   - Initially multimodal analgesic regimen   - PT and OT evaluation extremities indicated

## 2020-08-31 NOTE — PLAN OF CARE
Problem: Prexisting or High Potential for Compromised Skin Integrity  Goal: Skin integrity is maintained or improved  Description: INTERVENTIONS:  - Identify patients at risk for skin breakdown  - Assess and monitor skin integrity  - Assess and monitor nutrition and hydration status  - Monitor labs   - Assess for incontinence   - Turn and reposition patient  - Assist with mobility/ambulation  - Relieve pressure over bony prominences  - Avoid friction and shearing  - Provide appropriate hygiene as needed including keeping skin clean and dry  - Evaluate need for skin moisturizer/barrier cream  - Collaborate with interdisciplinary team   - Patient/family teaching  - Consider wound care consult   Outcome: Progressing     Problem: Potential for Falls  Goal: Patient will remain free of falls  Description: INTERVENTIONS:  - Assess patient frequently for physical needs  -  Identify cognitive and physical deficits and behaviors that affect risk of falls    -  [place] fall precautions as indicated by assessment   - Educate patient/family on patient safety including physical limitations  - Instruct patient to call for assistance with activity based on assessment  - Modify environment to reduce risk of injury  - Consider OT/PT consult to assist with strengthening/mobility  Outcome: Progressing     Problem: INFECTION - ADULT  Goal: Absence or prevention of progression during hospitalization  Description: INTERVENTIONS:  - Assess and monitor for signs and symptoms of infection  - Monitor lab/diagnostic results  - Monitor all insertion sites, i e  indwelling lines, tubes, and drains  - Monitor endotracheal if appropriate and nasal secretions for changes in amount and color  - [place] appropriate cooling/warming therapies per order  - Administer medications as ordered  - Instruct and encourage patient and family to use good hand hygiene technique  - Identify and instruct in appropriate isolation precautions for identified infection/condition  Outcome: Progressing  Goal: Absence of fever/infection during neutropenic period  Description: INTERVENTIONS:  - Monitor WBC    Outcome: Progressing     Problem: SAFETY ADULT  Goal: Patient will remain free of falls  Description: INTERVENTIONS:  - Assess patient frequently for physical needs  -  Identify cognitive and physical deficits and behaviors that affect risk of falls    -  [place] fall precautions as indicated by assessment   - Educate patient/family on patient safety including physical limitations  - Instruct patient to call for assistance with activity based on assessment  - Modify environment to reduce risk of injury  - Consider OT/PT consult to assist with strengthening/mobility  Outcome: Progressing  Goal: Maintain or return to baseline ADL function  Description: INTERVENTIONS:  -  Assess patient's ability to carry out ADLs; assess patient's baseline for ADL function and identify physical deficits which impact ability to perform ADLs (bathing, care of mouth/teeth, toileting, grooming, dressing, etc )  - Assess/evaluate cause of self-care deficits   - Assess range of motion  - Assess patient's mobility; develop plan if impaired  - Assess patient's need for assistive devices and provide as appropriate  - Encourage maximum independence but intervene and supervise when necessary  - Involve family in performance of ADLs  - Assess for home care needs following discharge   - Consider OT consult to assist with ADL evaluation and planning for discharge  - Provide patient education as appropriate  Outcome: Progressing  Goal: Maintain or return mobility status to optimal level  Description: INTERVENTIONS:  - Assess patient's baseline mobility status (ambulation, transfers, stairs, etc )    - Identify cognitive and physical deficits and behaviors that affect mobility  - Identify mobility aids required to assist with transfers and/or ambulation (gait belt, sit-to-stand, lift, walker, cane, etc )  - [place] fall precautions as indicated by assessment  - Record patient progress and toleration of activity level on Mobility SBAR; progress patient to next Phase/Stage  - Instruct patient to call for assistance with activity based on assessment  - Consider rehabilitation consult to assist with strengthening/weightbearing, etc   Outcome: Progressing     Problem: DISCHARGE PLANNING  Goal: Discharge to home or other facility with appropriate resources  Description: INTERVENTIONS:  - Identify barriers to discharge w/patient and caregiver  - Arrange for needed discharge resources and transportation as appropriate  - Identify discharge learning needs (meds, wound care, etc )  - Arrange for interpretive services to assist at discharge as needed  - Refer to Case Management Department for coordinating discharge planning if the patient needs post-hospital services based on physician/advanced practitioner order or complex needs related to functional status, cognitive ability, or social support system  Outcome: Progressing     Problem: Knowledge Deficit  Goal: Patient/family/caregiver demonstrates understanding of disease process, treatment plan, medications, and discharge instructions  Description: Complete learning assessment and assess knowledge base    Interventions:  - Provide teaching at level of understanding  - Provide teaching via preferred learning methods  Outcome: Progressing     Problem: NEUROSENSORY - ADULT  Goal: Achieves stable or improved neurological status  Description: INTERVENTIONS  - Monitor and report changes in neurological status  - Monitor vital signs such as temperature, blood pressure, glucose, and any other labs ordered   - Initiate measures to prevent increased intracranial pressure  - Monitor for seizure activity and implement precautions if appropriate      Outcome: Progressing  Goal: Remains free of injury related to seizures activity  Description: INTERVENTIONS  - Maintain airway, patient safety  and administer oxygen as ordered  - Monitor patient for seizure activity, document and report duration and description of seizure to physician/advanced practitioner  - If seizure occurs,  ensure patient safety during seizure  - Reorient patient post seizure  - Seizure pads on all 4 side rails  - Instruct patient/family to notify RN of any seizure activity including if an aura is experienced  - Instruct patient/family to call for assistance with activity based on nursing assessment  - Administer anti-seizure medications if ordered    Outcome: Progressing  Goal: Achieves maximal functionality and self care  Description: INTERVENTIONS  - Monitor swallowing and airway patency with patient fatigue and changes in neurological status  - Encourage and assist patient to increase activity and self care     - Encourage visually impaired, hearing impaired and aphasic patients to use assistive/communication devices  Outcome: Progressing     Problem: SKIN/TISSUE INTEGRITY - ADULT  Goal: Skin integrity remains intact  Description: INTERVENTIONS  - Identify patients at risk for skin breakdown  - Assess and monitor skin integrity  - Assess and monitor nutrition and hydration status  - Monitor labs (i e  albumin)  - Assess for incontinence   - Turn and reposition patient  - Assist with mobility/ambulation  - Relieve pressure over bony prominences  - Avoid friction and shearing  - Provide appropriate hygiene as needed including keeping skin clean and dry  - Evaluate need for skin moisturizer/barrier cream  - Collaborate with interdisciplinary team (i e  Nutrition, Rehabilitation, etc )   - Patient/family teaching  Outcome: Progressing  Goal: Incision(s), wounds(s) or drain site(s) healing without S/S of infection  Description: INTERVENTIONS  - Assess and document risk factors for skin impairment   - Assess and document dressing, incision, wound bed, drain sites and surrounding tissue  - Consider nutrition services referral as needed  - Oral mucous membranes remain intact  - Provide patient/ family education  Outcome: Progressing  Goal: Oral mucous membranes remain intact  Description: INTERVENTIONS  - Assess oral mucosa and hygiene practices  - Implement preventative oral hygiene regimen  - Implement oral medicated treatments as ordered  - Initiate Nutrition services referral as needed  Outcome: Progressing
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
## 2020-09-01 ENCOUNTER — APPOINTMENT (INPATIENT)
Dept: RADIOLOGY | Facility: HOSPITAL | Age: 30
DRG: 500 | End: 2020-09-01
Payer: COMMERCIAL

## 2020-09-01 ENCOUNTER — ANESTHESIA (INPATIENT)
Dept: PERIOP | Facility: HOSPITAL | Age: 30
DRG: 500 | End: 2020-09-01
Payer: COMMERCIAL

## 2020-09-01 LAB
ANION GAP SERPL CALCULATED.3IONS-SCNC: 6 MMOL/L (ref 4–13)
ANION GAP SERPL CALCULATED.3IONS-SCNC: 7 MMOL/L (ref 4–13)
BASOPHILS # BLD AUTO: 0 THOUSANDS/ΜL (ref 0–0.1)
BASOPHILS # BLD AUTO: 0 THOUSANDS/ΜL (ref 0–0.1)
BASOPHILS NFR BLD AUTO: 0 % (ref 0–1)
BASOPHILS NFR BLD AUTO: 0 % (ref 0–1)
BUN SERPL-MCNC: 11 MG/DL (ref 5–25)
BUN SERPL-MCNC: 11 MG/DL (ref 5–25)
CALCIUM SERPL-MCNC: 8.3 MG/DL (ref 8.3–10.1)
CALCIUM SERPL-MCNC: 8.6 MG/DL (ref 8.3–10.1)
CHLORIDE SERPL-SCNC: 100 MMOL/L (ref 100–108)
CHLORIDE SERPL-SCNC: 106 MMOL/L (ref 100–108)
CO2 SERPL-SCNC: 26 MMOL/L (ref 21–32)
CO2 SERPL-SCNC: 28 MMOL/L (ref 21–32)
CREAT SERPL-MCNC: 0.95 MG/DL (ref 0.6–1.3)
CREAT SERPL-MCNC: 1.12 MG/DL (ref 0.6–1.3)
EOSINOPHIL # BLD AUTO: 0 THOUSAND/ΜL (ref 0–0.61)
EOSINOPHIL # BLD AUTO: 0 THOUSAND/ΜL (ref 0–0.61)
EOSINOPHIL NFR BLD AUTO: 0 % (ref 0–6)
EOSINOPHIL NFR BLD AUTO: 0 % (ref 0–6)
ERYTHROCYTE [DISTWIDTH] IN BLOOD BY AUTOMATED COUNT: 12 % (ref 11.6–15.1)
ERYTHROCYTE [DISTWIDTH] IN BLOOD BY AUTOMATED COUNT: 12.1 % (ref 11.6–15.1)
GFR SERPL CREATININE-BSD FRML MDRD: 106 ML/MIN/1.73SQ M
GFR SERPL CREATININE-BSD FRML MDRD: 87 ML/MIN/1.73SQ M
GLUCOSE SERPL-MCNC: 110 MG/DL (ref 65–140)
GLUCOSE SERPL-MCNC: 121 MG/DL (ref 65–140)
HCT VFR BLD AUTO: 37.8 % (ref 36.5–49.3)
HCT VFR BLD AUTO: 37.8 % (ref 36.5–49.3)
HGB BLD-MCNC: 13.8 G/DL (ref 12–17)
HGB BLD-MCNC: 13.9 G/DL (ref 12–17)
IMM GRANULOCYTES # BLD AUTO: 0.03 THOUSAND/UL (ref 0–0.2)
IMM GRANULOCYTES # BLD AUTO: 0.04 THOUSAND/UL (ref 0–0.2)
IMM GRANULOCYTES NFR BLD AUTO: 1 % (ref 0–2)
IMM GRANULOCYTES NFR BLD AUTO: 1 % (ref 0–2)
LYMPHOCYTES # BLD AUTO: 0.44 THOUSANDS/ΜL (ref 0.6–4.47)
LYMPHOCYTES # BLD AUTO: 1.23 THOUSANDS/ΜL (ref 0.6–4.47)
LYMPHOCYTES NFR BLD AUTO: 21 % (ref 14–44)
LYMPHOCYTES NFR BLD AUTO: 6 % (ref 14–44)
MCH RBC QN AUTO: 32.2 PG (ref 26.8–34.3)
MCH RBC QN AUTO: 32.3 PG (ref 26.8–34.3)
MCHC RBC AUTO-ENTMCNC: 36.5 G/DL (ref 31.4–37.4)
MCHC RBC AUTO-ENTMCNC: 36.8 G/DL (ref 31.4–37.4)
MCV RBC AUTO: 88 FL (ref 82–98)
MCV RBC AUTO: 88 FL (ref 82–98)
MONOCYTES # BLD AUTO: 0.14 THOUSAND/ΜL (ref 0.17–1.22)
MONOCYTES # BLD AUTO: 0.38 THOUSAND/ΜL (ref 0.17–1.22)
MONOCYTES NFR BLD AUTO: 2 % (ref 4–12)
MONOCYTES NFR BLD AUTO: 7 % (ref 4–12)
NEUTROPHILS # BLD AUTO: 4.19 THOUSANDS/ΜL (ref 1.85–7.62)
NEUTROPHILS # BLD AUTO: 7.33 THOUSANDS/ΜL (ref 1.85–7.62)
NEUTS SEG NFR BLD AUTO: 71 % (ref 43–75)
NEUTS SEG NFR BLD AUTO: 91 % (ref 43–75)
NRBC BLD AUTO-RTO: 0 /100 WBCS
NRBC BLD AUTO-RTO: 0 /100 WBCS
PLATELET # BLD AUTO: 187 THOUSANDS/UL (ref 149–390)
PLATELET # BLD AUTO: 193 THOUSANDS/UL (ref 149–390)
PMV BLD AUTO: 9.7 FL (ref 8.9–12.7)
PMV BLD AUTO: 9.8 FL (ref 8.9–12.7)
POTASSIUM SERPL-SCNC: 3.8 MMOL/L (ref 3.5–5.3)
POTASSIUM SERPL-SCNC: 4.1 MMOL/L (ref 3.5–5.3)
RBC # BLD AUTO: 4.29 MILLION/UL (ref 3.88–5.62)
RBC # BLD AUTO: 4.31 MILLION/UL (ref 3.88–5.62)
SODIUM SERPL-SCNC: 134 MMOL/L (ref 136–145)
SODIUM SERPL-SCNC: 139 MMOL/L (ref 136–145)
WBC # BLD AUTO: 5.83 THOUSAND/UL (ref 4.31–10.16)
WBC # BLD AUTO: 7.95 THOUSAND/UL (ref 4.31–10.16)

## 2020-09-01 PROCEDURE — 80048 BASIC METABOLIC PNL TOTAL CA: CPT | Performed by: EMERGENCY MEDICINE

## 2020-09-01 PROCEDURE — C1713 ANCHOR/SCREW BN/BN,TIS/BN: HCPCS | Performed by: ORTHOPAEDIC SURGERY

## 2020-09-01 PROCEDURE — 80048 BASIC METABOLIC PNL TOTAL CA: CPT | Performed by: ORTHOPAEDIC SURGERY

## 2020-09-01 PROCEDURE — NC001 PR NO CHARGE: Performed by: NEUROLOGICAL SURGERY

## 2020-09-01 PROCEDURE — 73100 X-RAY EXAM OF WRIST: CPT

## 2020-09-01 PROCEDURE — G1004 CDSM NDSC: HCPCS

## 2020-09-01 PROCEDURE — 70498 CT ANGIOGRAPHY NECK: CPT

## 2020-09-01 PROCEDURE — 25608 OPTX DST RD XART FX/EPI SEP2: CPT | Performed by: ORTHOPAEDIC SURGERY

## 2020-09-01 PROCEDURE — 73110 X-RAY EXAM OF WRIST: CPT

## 2020-09-01 PROCEDURE — 99233 SBSQ HOSP IP/OBS HIGH 50: CPT | Performed by: SURGERY

## 2020-09-01 PROCEDURE — 0PSJ04Z REPOSITION LEFT RADIUS WITH INTERNAL FIXATION DEVICE, OPEN APPROACH: ICD-10-PCS | Performed by: ORTHOPAEDIC SURGERY

## 2020-09-01 PROCEDURE — 99232 SBSQ HOSP IP/OBS MODERATE 35: CPT | Performed by: PHYSICIAN ASSISTANT

## 2020-09-01 PROCEDURE — NC001 PR NO CHARGE: Performed by: ORTHOPAEDIC SURGERY

## 2020-09-01 PROCEDURE — 0PSH04Z REPOSITION RIGHT RADIUS WITH INTERNAL FIXATION DEVICE, OPEN APPROACH: ICD-10-PCS | Performed by: ORTHOPAEDIC SURGERY

## 2020-09-01 PROCEDURE — 0LN50ZZ RELEASE RIGHT LOWER ARM AND WRIST TENDON, OPEN APPROACH: ICD-10-PCS | Performed by: ORTHOPAEDIC SURGERY

## 2020-09-01 PROCEDURE — 85025 COMPLETE CBC W/AUTO DIFF WBC: CPT | Performed by: ORTHOPAEDIC SURGERY

## 2020-09-01 PROCEDURE — 70496 CT ANGIOGRAPHY HEAD: CPT

## 2020-09-01 PROCEDURE — 85025 COMPLETE CBC W/AUTO DIFF WBC: CPT | Performed by: EMERGENCY MEDICINE

## 2020-09-01 DEVICE — K-WIRE 1.6 X 100MM: Type: IMPLANTABLE DEVICE | Site: WRIST | Status: FUNCTIONAL

## 2020-09-01 DEVICE — IMPLANTABLE DEVICE: Type: IMPLANTABLE DEVICE | Site: WRIST | Status: FUNCTIONAL

## 2020-09-01 DEVICE — SCREW CORTICAL 2.3 X 16MM: Type: IMPLANTABLE DEVICE | Site: WRIST | Status: FUNCTIONAL

## 2020-09-01 RX ORDER — CEFAZOLIN SODIUM 1 G/50ML
SOLUTION INTRAVENOUS AS NEEDED
Status: DISCONTINUED | OUTPATIENT
Start: 2020-09-01 | End: 2020-09-01

## 2020-09-01 RX ORDER — FENTANYL CITRATE 50 UG/ML
INJECTION, SOLUTION INTRAMUSCULAR; INTRAVENOUS AS NEEDED
Status: DISCONTINUED | OUTPATIENT
Start: 2020-09-01 | End: 2020-09-01

## 2020-09-01 RX ORDER — METHOCARBAMOL 750 MG/1
750 TABLET, FILM COATED ORAL EVERY 6 HOURS SCHEDULED
Status: DISCONTINUED | OUTPATIENT
Start: 2020-09-01 | End: 2020-09-03 | Stop reason: HOSPADM

## 2020-09-01 RX ORDER — CEFAZOLIN SODIUM 2 G/50ML
2000 SOLUTION INTRAVENOUS EVERY 8 HOURS
Status: DISCONTINUED | OUTPATIENT
Start: 2020-09-01 | End: 2020-09-01 | Stop reason: HOSPADM

## 2020-09-01 RX ORDER — DEXMEDETOMIDINE HYDROCHLORIDE 100 UG/ML
INJECTION, SOLUTION INTRAVENOUS AS NEEDED
Status: DISCONTINUED | OUTPATIENT
Start: 2020-09-01 | End: 2020-09-01

## 2020-09-01 RX ORDER — ACETAMINOPHEN 325 MG/1
975 TABLET ORAL EVERY 8 HOURS SCHEDULED
Status: DISCONTINUED | OUTPATIENT
Start: 2020-09-01 | End: 2020-09-03 | Stop reason: HOSPADM

## 2020-09-01 RX ORDER — ROCURONIUM BROMIDE 10 MG/ML
INJECTION, SOLUTION INTRAVENOUS AS NEEDED
Status: DISCONTINUED | OUTPATIENT
Start: 2020-09-01 | End: 2020-09-01

## 2020-09-01 RX ORDER — PROPOFOL 10 MG/ML
INJECTION, EMULSION INTRAVENOUS AS NEEDED
Status: DISCONTINUED | OUTPATIENT
Start: 2020-09-01 | End: 2020-09-01

## 2020-09-01 RX ORDER — HYDROMORPHONE HCL/PF 1 MG/ML
0.5 SYRINGE (ML) INJECTION
Status: DISCONTINUED | OUTPATIENT
Start: 2020-09-01 | End: 2020-09-01 | Stop reason: HOSPADM

## 2020-09-01 RX ORDER — EPHEDRINE SULFATE 50 MG/ML
INJECTION INTRAVENOUS AS NEEDED
Status: DISCONTINUED | OUTPATIENT
Start: 2020-09-01 | End: 2020-09-01

## 2020-09-01 RX ORDER — HEPARIN SODIUM 5000 [USP'U]/ML
5000 INJECTION, SOLUTION INTRAVENOUS; SUBCUTANEOUS EVERY 8 HOURS SCHEDULED
Status: DISCONTINUED | OUTPATIENT
Start: 2020-09-01 | End: 2020-09-03 | Stop reason: HOSPADM

## 2020-09-01 RX ORDER — CEFAZOLIN SODIUM 2 G/50ML
2000 SOLUTION INTRAVENOUS EVERY 8 HOURS
Status: COMPLETED | OUTPATIENT
Start: 2020-09-01 | End: 2020-09-02

## 2020-09-01 RX ORDER — DOCUSATE SODIUM 100 MG/1
100 CAPSULE, LIQUID FILLED ORAL 2 TIMES DAILY
Status: DISCONTINUED | OUTPATIENT
Start: 2020-09-01 | End: 2020-09-03 | Stop reason: HOSPADM

## 2020-09-01 RX ORDER — SODIUM CHLORIDE 9 MG/ML
INJECTION, SOLUTION INTRAVENOUS CONTINUOUS PRN
Status: DISCONTINUED | OUTPATIENT
Start: 2020-09-01 | End: 2020-09-01

## 2020-09-01 RX ORDER — ONDANSETRON 2 MG/ML
4 INJECTION INTRAMUSCULAR; INTRAVENOUS ONCE AS NEEDED
Status: DISCONTINUED | OUTPATIENT
Start: 2020-09-01 | End: 2020-09-01 | Stop reason: HOSPADM

## 2020-09-01 RX ORDER — MAGNESIUM HYDROXIDE 1200 MG/15ML
LIQUID ORAL AS NEEDED
Status: DISCONTINUED | OUTPATIENT
Start: 2020-09-01 | End: 2020-09-01 | Stop reason: HOSPADM

## 2020-09-01 RX ORDER — KETAMINE HCL IN NACL, ISO-OSM 100MG/10ML
SYRINGE (ML) INJECTION AS NEEDED
Status: DISCONTINUED | OUTPATIENT
Start: 2020-09-01 | End: 2020-09-01

## 2020-09-01 RX ORDER — MIDAZOLAM HYDROCHLORIDE 2 MG/2ML
INJECTION, SOLUTION INTRAMUSCULAR; INTRAVENOUS AS NEEDED
Status: DISCONTINUED | OUTPATIENT
Start: 2020-09-01 | End: 2020-09-01

## 2020-09-01 RX ORDER — SUCCINYLCHOLINE/SOD CL,ISO/PF 100 MG/5ML
SYRINGE (ML) INTRAVENOUS AS NEEDED
Status: DISCONTINUED | OUTPATIENT
Start: 2020-09-01 | End: 2020-09-01

## 2020-09-01 RX ORDER — FENTANYL CITRATE/PF 50 MCG/ML
50 SYRINGE (ML) INJECTION
Status: DISCONTINUED | OUTPATIENT
Start: 2020-09-01 | End: 2020-09-01 | Stop reason: HOSPADM

## 2020-09-01 RX ORDER — ONDANSETRON 2 MG/ML
INJECTION INTRAMUSCULAR; INTRAVENOUS AS NEEDED
Status: DISCONTINUED | OUTPATIENT
Start: 2020-09-01 | End: 2020-09-01

## 2020-09-01 RX ORDER — NEOSTIGMINE METHYLSULFATE 1 MG/ML
INJECTION INTRAVENOUS AS NEEDED
Status: DISCONTINUED | OUTPATIENT
Start: 2020-09-01 | End: 2020-09-01

## 2020-09-01 RX ORDER — DEXAMETHASONE SODIUM PHOSPHATE 10 MG/ML
INJECTION, SOLUTION INTRAMUSCULAR; INTRAVENOUS AS NEEDED
Status: DISCONTINUED | OUTPATIENT
Start: 2020-09-01 | End: 2020-09-01

## 2020-09-01 RX ORDER — SODIUM CHLORIDE, SODIUM LACTATE, POTASSIUM CHLORIDE, CALCIUM CHLORIDE 600; 310; 30; 20 MG/100ML; MG/100ML; MG/100ML; MG/100ML
125 INJECTION, SOLUTION INTRAVENOUS CONTINUOUS
Status: DISCONTINUED | OUTPATIENT
Start: 2020-09-01 | End: 2020-09-03

## 2020-09-01 RX ORDER — SODIUM CHLORIDE, SODIUM LACTATE, POTASSIUM CHLORIDE, CALCIUM CHLORIDE 600; 310; 30; 20 MG/100ML; MG/100ML; MG/100ML; MG/100ML
INJECTION, SOLUTION INTRAVENOUS CONTINUOUS PRN
Status: DISCONTINUED | OUTPATIENT
Start: 2020-09-01 | End: 2020-09-01

## 2020-09-01 RX ORDER — POLYETHYLENE GLYCOL 3350 17 G/17G
17 POWDER, FOR SOLUTION ORAL DAILY PRN
Status: DISCONTINUED | OUTPATIENT
Start: 2020-09-01 | End: 2020-09-03 | Stop reason: HOSPADM

## 2020-09-01 RX ORDER — GLYCOPYRROLATE 0.2 MG/ML
INJECTION INTRAMUSCULAR; INTRAVENOUS AS NEEDED
Status: DISCONTINUED | OUTPATIENT
Start: 2020-09-01 | End: 2020-09-01

## 2020-09-01 RX ORDER — HYDROMORPHONE HCL/PF 1 MG/ML
SYRINGE (ML) INJECTION AS NEEDED
Status: DISCONTINUED | OUTPATIENT
Start: 2020-09-01 | End: 2020-09-01

## 2020-09-01 RX ADMIN — FENTANYL CITRATE 50 MCG: 50 INJECTION, SOLUTION INTRAMUSCULAR; INTRAVENOUS at 14:00

## 2020-09-01 RX ADMIN — ACETAMINOPHEN 975 MG: 325 TABLET, FILM COATED ORAL at 22:09

## 2020-09-01 RX ADMIN — FENTANYL CITRATE 25 MCG: 50 INJECTION, SOLUTION INTRAMUSCULAR; INTRAVENOUS at 16:48

## 2020-09-01 RX ADMIN — HYDROMORPHONE HYDROCHLORIDE 0.5 MG: 1 INJECTION, SOLUTION INTRAMUSCULAR; INTRAVENOUS; SUBCUTANEOUS at 10:29

## 2020-09-01 RX ADMIN — IOHEXOL 85 ML: 350 INJECTION, SOLUTION INTRAVENOUS at 04:55

## 2020-09-01 RX ADMIN — DEXMEDETOMIDINE 12 MCG: 100 INJECTION, SOLUTION, CONCENTRATE INTRAVENOUS at 15:57

## 2020-09-01 RX ADMIN — DEXMEDETOMIDINE 8 MCG: 100 INJECTION, SOLUTION, CONCENTRATE INTRAVENOUS at 16:37

## 2020-09-01 RX ADMIN — PHENYLEPHRINE HYDROCHLORIDE 100 MCG: 10 INJECTION INTRAVENOUS at 14:48

## 2020-09-01 RX ADMIN — SODIUM CHLORIDE, SODIUM LACTATE, POTASSIUM CHLORIDE, AND CALCIUM CHLORIDE 125 ML/HR: .6; .31; .03; .02 INJECTION, SOLUTION INTRAVENOUS at 18:00

## 2020-09-01 RX ADMIN — DEXMEDETOMIDINE 12 MCG: 100 INJECTION, SOLUTION, CONCENTRATE INTRAVENOUS at 15:49

## 2020-09-01 RX ADMIN — HYDROMORPHONE HYDROCHLORIDE 0.5 MG: 1 INJECTION, SOLUTION INTRAMUSCULAR; INTRAVENOUS; SUBCUTANEOUS at 00:37

## 2020-09-01 RX ADMIN — FENTANYL CITRATE 25 MCG: 50 INJECTION, SOLUTION INTRAMUSCULAR; INTRAVENOUS at 15:25

## 2020-09-01 RX ADMIN — NEOMYCIN SULFATE, POLYMYXIN B SULFATE AND DEXAMETHASONE 1 DROP: 3.5; 10000; 1 SUSPENSION OPHTHALMIC at 22:19

## 2020-09-01 RX ADMIN — DILTIAZEM HYDROCHLORIDE 240 MG: 240 CAPSULE, COATED, EXTENDED RELEASE ORAL at 08:42

## 2020-09-01 RX ADMIN — Medication 20 MG: at 15:30

## 2020-09-01 RX ADMIN — ROCURONIUM BROMIDE 20 MG: 10 INJECTION, SOLUTION INTRAVENOUS at 14:20

## 2020-09-01 RX ADMIN — HYDRALAZINE HYDROCHLORIDE 5 MG: 20 INJECTION INTRAMUSCULAR; INTRAVENOUS at 00:38

## 2020-09-01 RX ADMIN — SODIUM CHLORIDE: 0.9 INJECTION, SOLUTION INTRAVENOUS at 14:04

## 2020-09-01 RX ADMIN — DEXMEDETOMIDINE 12 MCG: 100 INJECTION, SOLUTION, CONCENTRATE INTRAVENOUS at 15:45

## 2020-09-01 RX ADMIN — Medication 50 MCG: at 18:29

## 2020-09-01 RX ADMIN — ROCURONIUM BROMIDE 10 MG: 10 INJECTION, SOLUTION INTRAVENOUS at 15:30

## 2020-09-01 RX ADMIN — PROPOFOL 150 MG: 10 INJECTION, EMULSION INTRAVENOUS at 14:00

## 2020-09-01 RX ADMIN — GLYCOPYRROLATE 0.4 MG: 0.2 INJECTION, SOLUTION INTRAMUSCULAR; INTRAVENOUS at 17:06

## 2020-09-01 RX ADMIN — METHOCARBAMOL TABLETS 750 MG: 750 TABLET, COATED ORAL at 00:37

## 2020-09-01 RX ADMIN — METHOCARBAMOL TABLETS 750 MG: 750 TABLET, COATED ORAL at 08:42

## 2020-09-01 RX ADMIN — METHOCARBAMOL TABLETS 750 MG: 750 TABLET, COATED ORAL at 22:10

## 2020-09-01 RX ADMIN — LEVETIRACETAM 500 MG: 100 INJECTION, SOLUTION INTRAVENOUS at 22:08

## 2020-09-01 RX ADMIN — OXYCODONE HYDROCHLORIDE 10 MG: 10 TABLET ORAL at 08:42

## 2020-09-01 RX ADMIN — SODIUM CHLORIDE, SODIUM LACTATE, POTASSIUM CHLORIDE, AND CALCIUM CHLORIDE: .6; .31; .03; .02 INJECTION, SOLUTION INTRAVENOUS at 13:43

## 2020-09-01 RX ADMIN — ROCURONIUM BROMIDE 10 MG: 10 INJECTION, SOLUTION INTRAVENOUS at 14:53

## 2020-09-01 RX ADMIN — LEVETIRACETAM 500 MG: 100 INJECTION, SOLUTION INTRAVENOUS at 08:42

## 2020-09-01 RX ADMIN — NEOSTIGMINE METHYLSULFATE 3 MG: 1 INJECTION, SOLUTION INTRAVENOUS at 17:06

## 2020-09-01 RX ADMIN — Medication 100 MG: at 14:00

## 2020-09-01 RX ADMIN — EPHEDRINE SULFATE 5 MG: 50 INJECTION, SOLUTION INTRAVENOUS at 16:33

## 2020-09-01 RX ADMIN — PHENYLEPHRINE HYDROCHLORIDE 25 MCG/MIN: 10 INJECTION INTRAVENOUS at 15:05

## 2020-09-01 RX ADMIN — CARBAMIDE PEROXIDE 6.5% 5 DROP: 6.5 LIQUID AURICULAR (OTIC) at 08:50

## 2020-09-01 RX ADMIN — HYDROMORPHONE HYDROCHLORIDE 0.5 MG: 1 INJECTION, SOLUTION INTRAMUSCULAR; INTRAVENOUS; SUBCUTANEOUS at 22:10

## 2020-09-01 RX ADMIN — FENTANYL CITRATE 25 MCG: 50 INJECTION, SOLUTION INTRAMUSCULAR; INTRAVENOUS at 15:44

## 2020-09-01 RX ADMIN — ONDANSETRON 4 MG: 2 INJECTION INTRAMUSCULAR; INTRAVENOUS at 16:46

## 2020-09-01 RX ADMIN — HYDROMORPHONE HYDROCHLORIDE 0.5 MG: 1 INJECTION, SOLUTION INTRAMUSCULAR; INTRAVENOUS; SUBCUTANEOUS at 17:28

## 2020-09-01 RX ADMIN — PHENYLEPHRINE HYDROCHLORIDE 100 MCG: 10 INJECTION INTRAVENOUS at 15:13

## 2020-09-01 RX ADMIN — CEFAZOLIN SODIUM 1000 MG: 1 SOLUTION INTRAVENOUS at 15:44

## 2020-09-01 RX ADMIN — DEXMEDETOMIDINE 8 MCG: 100 INJECTION, SOLUTION, CONCENTRATE INTRAVENOUS at 16:20

## 2020-09-01 RX ADMIN — CEFAZOLIN SODIUM 2000 MG: 2 SOLUTION INTRAVENOUS at 14:05

## 2020-09-01 RX ADMIN — FENTANYL CITRATE 50 MCG: 50 INJECTION, SOLUTION INTRAMUSCULAR; INTRAVENOUS at 13:43

## 2020-09-01 RX ADMIN — OXYCODONE HYDROCHLORIDE 10 MG: 10 TABLET ORAL at 20:09

## 2020-09-01 RX ADMIN — FENTANYL CITRATE 25 MCG: 50 INJECTION, SOLUTION INTRAMUSCULAR; INTRAVENOUS at 16:02

## 2020-09-01 RX ADMIN — DEXMEDETOMIDINE 12 MCG: 100 INJECTION, SOLUTION, CONCENTRATE INTRAVENOUS at 15:53

## 2020-09-01 RX ADMIN — Medication 20 MG: at 14:46

## 2020-09-01 RX ADMIN — CEFAZOLIN SODIUM 2000 MG: 2 SOLUTION INTRAVENOUS at 22:09

## 2020-09-01 RX ADMIN — DEXMEDETOMIDINE 12 MCG: 100 INJECTION, SOLUTION, CONCENTRATE INTRAVENOUS at 16:01

## 2020-09-01 RX ADMIN — Medication 10 MG: at 15:56

## 2020-09-01 RX ADMIN — EPHEDRINE SULFATE 5 MG: 50 INJECTION, SOLUTION INTRAVENOUS at 15:08

## 2020-09-01 RX ADMIN — DEXMEDETOMIDINE 12 MCG: 100 INJECTION, SOLUTION, CONCENTRATE INTRAVENOUS at 16:04

## 2020-09-01 RX ADMIN — SODIUM CHLORIDE, SODIUM LACTATE, POTASSIUM CHLORIDE, AND CALCIUM CHLORIDE: .6; .31; .03; .02 INJECTION, SOLUTION INTRAVENOUS at 15:30

## 2020-09-01 RX ADMIN — DEXAMETHASONE SODIUM PHOSPHATE 10 MG: 10 INJECTION, SOLUTION INTRAMUSCULAR; INTRAVENOUS at 16:46

## 2020-09-01 RX ADMIN — ACETAMINOPHEN 650 MG: 325 TABLET, FILM COATED ORAL at 00:37

## 2020-09-01 RX ADMIN — Medication 50 MCG: at 17:42

## 2020-09-01 RX ADMIN — MIDAZOLAM 2 MG: 1 INJECTION INTRAMUSCULAR; INTRAVENOUS at 13:43

## 2020-09-01 RX ADMIN — HYDROMORPHONE HYDROCHLORIDE 0.5 MG: 1 INJECTION, SOLUTION INTRAMUSCULAR; INTRAVENOUS; SUBCUTANEOUS at 03:47

## 2020-09-01 RX ADMIN — METHOCARBAMOL TABLETS 750 MG: 750 TABLET, COATED ORAL at 20:08

## 2020-09-01 RX ADMIN — PHENYLEPHRINE HYDROCHLORIDE 100 MCG: 10 INJECTION INTRAVENOUS at 15:01
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
## 2020-09-01 NOTE — ASSESSMENT & PLAN NOTE
- MRI spine - "Findings consistent with C1 and C2 fracture with a small epidural hematoma extending from the posterior fossa to the inferior C3 level "  - Neurosurgery consult, non-operative management   - Maintain cervical collar and precautions   - Continue neuro checks

## 2020-09-01 NOTE — QUICK NOTE
Post-op check    Pt is s/p:  1  Open reduction internal fixation right two-part intra-articular distal radius fracture  2  Open reduction internal fixation left fracture dislocation distal radius  3  Application bilateral short-arm splints  (Bilateral)    EBL 50cc    On arrival to room, patient is sleeping however awakes to name, he is in acute distress  Will continue with pain management, maintain splints, NWB to b/l UE  Will start subQ heparin for AC  Physical Exam  Vitals signs reviewed  Constitutional:       Appearance: Normal appearance  HENT:      Head: Normocephalic  Eyes:      General: No scleral icterus  Right eye: No discharge  Left eye: No discharge  Cardiovascular:      Rate and Rhythm: Normal rate and regular rhythm  Pulmonary:      Effort: Pulmonary effort is normal  No respiratory distress  Breath sounds: Normal breath sounds  Abdominal:      General: There is no distension  Palpations: Abdomen is soft  Tenderness: There is no abdominal tenderness  There is no guarding or rebound  Musculoskeletal:         General: Tenderness and signs of injury present  Comments: Neck brace in place; b/l splints to UE in place with good cap refill, distal finger color, sensation intact   Skin:     General: Skin is warm  Coloration: Skin is not jaundiced or pale  Neurological:      General: No focal deficit present  Mental Status: He is alert  Mental status is at baseline  Sensory: No sensory deficit

## 2020-09-01 NOTE — ASSESSMENT & PLAN NOTE
- Closed right wrist fracture dislocation status post closed reduction and splinting under conscious sedation in trauma bay    - Maintain splint and nonweightbearing status on right upper extremity   - OR with ortho for ORIF today
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
## 2020-09-01 NOTE — ASSESSMENT & PLAN NOTE
- CTA "Punctate eccentric filling defect in the right internal carotid artery cervical segment at the C2 level, adjacent to fractures, possibly representing focal intimal injury without evidence of dissection  Similar lesion in the cavernous segment of the right ICA, adjacent to the sphenoid sinus fracture, also suggestive of focal intimal injury without dissection  "  - Neurosurgery consulted  - Repeat CTA shows no residual flow limiting stenosis    No need for heparinization post op

## 2020-09-01 NOTE — ASSESSMENT & PLAN NOTE
- Nondisplaced C2, type II dens fracture  Patient neurological intact and moving all four extremities  - Neurosurgery consulted - non operative management  - maintain cervical collar   - Maintain cervical collar at all times in order Vista collar    - Monitor neurologic exam
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
## 2020-09-01 NOTE — OCCUPATIONAL THERAPY NOTE
OCCUPATIONAL THERAPY CANCEL NOTE:    ORDERS RECEIVED  CHART REVIEW COMPLETED  PT PLANNED FOR OR TODAY FOR ORIF OF B/L DISTAL RADIUS FX; WILL FOLLOW POST-OP      Chidorota Armando, MOT, OTR/L

## 2020-09-01 NOTE — ASSESSMENT & PLAN NOTE
- Multiple facial fractures as follows: There is a nondisplaced fracture of the lateral right maxillary wall extending to the right lateral orbital wall  This fracture also extends into the right sphenoid sinus with fluid opacification of the sinus consistent with hematoma  Additionally, the right-sided facial fractures extending to the right frontal bone with sinus involvement    - Optho consulted -  Maxitrol drops x 10 days   - Monitor neurological exam

## 2020-09-01 NOTE — ASSESSMENT & PLAN NOTE
- There is a nondisplaced talar fracture of the right inferior temporal bone  - Non-operative management per OMFS/Optho  - Multimodal analgesic regimen  - Will likely need outpatient ENT follow-up for audiology evaluation
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
## 2020-09-01 NOTE — ANESTHESIA POSTPROCEDURE EVALUATION
Post-Op Assessment Note    CV Status:  Stable    Pain management: adequate     Mental Status:  Lethargic and sleepy   Hydration Status:  Stable   PONV Controlled:  None   Airway Patency:  Patent      Post Op Vitals Reviewed: Yes      Staff: CRNA   Comments: hr 62, O2 sat 100%, bp 129/68, rr 10        No complications documented      BP      Temp      Pulse     Resp      SpO2

## 2020-09-01 NOTE — ASSESSMENT & PLAN NOTE
- Closed left distal radius fracture    - Maintain nonweightbearing status on right upper extremity   - Orthopedic Surgery consult   - OR for ORIF today

## 2020-09-01 NOTE — ASSESSMENT & PLAN NOTE
Right lateral mass C1 fracture, type 2 dens fracture  · C/o neck pain, no true radicular symptoms at this time    Imaging:  · CT cervical spine w/o, 8/30/2020: Nondisplaced type II dens fracture  Nondisplaced fracture at the inferior margin of the right lateral mass of C1   · MRI cervical spine w/o, 8/31/2020: Findings consistent with C1 and C2 fracture with a small epidural hematoma extending from the posterior fossa to the inferior C3 level  There is midline fissuring along the ligamentum flavum throughout the cervical spine without subluxation  · Xray cervical spine, 8/31/2020: Poorly defined C1 and C2 fractures as noted on multiple recent CT and MR examinations  No interval change in alignment      Plan:  · Continue VISTA collar at all times, dominga to shower  · Frequent neuro checks  · PT/OT evaluation - please keep patient in collar  · Cervical spine precautions  · Medical management and pain control per primary team  · No neurosurgical intervention is anticipated

## 2020-09-01 NOTE — ASSESSMENT & PLAN NOTE
- Stable traumatic right-sided SAH   - Neurosurgery Consult for evaluation and recommendations - non-operative management   - Keppra x 7 days for ppx  - q1h neuro checks   - Avoid all antiplatelet/anticoagulant medication   - Repeat CTH/CTA scan s/p OR then start heparin gtt for carotid injury

## 2020-09-01 NOTE — PLAN OF CARE
Problem: Prexisting or High Potential for Compromised Skin Integrity  Goal: Skin integrity is maintained or improved  Description: INTERVENTIONS:  - Identify patients at risk for skin breakdown  - Assess and monitor skin integrity  - Assess and monitor nutrition and hydration status  - Monitor labs   - Assess for incontinence   - Turn and reposition patient  - Assist with mobility/ambulation  - Relieve pressure over bony prominences  - Avoid friction and shearing  - Provide appropriate hygiene as needed including keeping skin clean and dry  - Evaluate need for skin moisturizer/barrier cream  - Collaborate with interdisciplinary team   - Patient/family teaching  - Consider wound care consult   Outcome: Progressing     Problem: Potential for Falls  Goal: Patient will remain free of falls  Description: INTERVENTIONS:  - Assess patient frequently for physical needs  -  Identify cognitive and physical deficits and behaviors that affect risk of falls    -  [place] fall precautions as indicated by assessment   - Educate patient/family on patient safety including physical limitations  - Instruct patient to call for assistance with activity based on assessment  - Modify environment to reduce risk of injury  - Consider OT/PT consult to assist with strengthening/mobility  Outcome: Progressing     Problem: INFECTION - ADULT  Goal: Absence or prevention of progression during hospitalization  Description: INTERVENTIONS:  - Assess and monitor for signs and symptoms of infection  - Monitor lab/diagnostic results  - Monitor all insertion sites, i e  indwelling lines, tubes, and drains  - Monitor endotracheal if appropriate and nasal secretions for changes in amount and color  - [place] appropriate cooling/warming therapies per order  - Administer medications as ordered  - Instruct and encourage patient and family to use good hand hygiene technique  - Identify and instruct in appropriate isolation precautions for identified infection/condition  Outcome: Progressing  Goal: Absence of fever/infection during neutropenic period  Description: INTERVENTIONS:  - Monitor WBC    Outcome: Progressing     Problem: SAFETY ADULT  Goal: Patient will remain free of falls  Description: INTERVENTIONS:  - Assess patient frequently for physical needs  -  Identify cognitive and physical deficits and behaviors that affect risk of falls    -  [place] fall precautions as indicated by assessment   - Educate patient/family on patient safety including physical limitations  - Instruct patient to call for assistance with activity based on assessment  - Modify environment to reduce risk of injury  - Consider OT/PT consult to assist with strengthening/mobility  Outcome: Progressing  Goal: Maintain or return to baseline ADL function  Description: INTERVENTIONS:  -  Assess patient's ability to carry out ADLs; assess patient's baseline for ADL function and identify physical deficits which impact ability to perform ADLs (bathing, care of mouth/teeth, toileting, grooming, dressing, etc )  - Assess/evaluate cause of self-care deficits   - Assess range of motion  - Assess patient's mobility; develop plan if impaired  - Assess patient's need for assistive devices and provide as appropriate  - Encourage maximum independence but intervene and supervise when necessary  - Involve family in performance of ADLs  - Assess for home care needs following discharge   - Consider OT consult to assist with ADL evaluation and planning for discharge  - Provide patient education as appropriate  Outcome: Progressing  Goal: Maintain or return mobility status to optimal level  Description: INTERVENTIONS:  - Assess patient's baseline mobility status (ambulation, transfers, stairs, etc )    - Identify cognitive and physical deficits and behaviors that affect mobility  - Identify mobility aids required to assist with transfers and/or ambulation (gait belt, sit-to-stand, lift, walker, cane, etc )  - [place] fall precautions as indicated by assessment  - Record patient progress and toleration of activity level on Mobility SBAR; progress patient to next Phase/Stage  - Instruct patient to call for assistance with activity based on assessment  - Consider rehabilitation consult to assist with strengthening/weightbearing, etc   Outcome: Progressing     Problem: DISCHARGE PLANNING  Goal: Discharge to home or other facility with appropriate resources  Description: INTERVENTIONS:  - Identify barriers to discharge w/patient and caregiver  - Arrange for needed discharge resources and transportation as appropriate  - Identify discharge learning needs (meds, wound care, etc )  - Arrange for interpretive services to assist at discharge as needed  - Refer to Case Management Department for coordinating discharge planning if the patient needs post-hospital services based on physician/advanced practitioner order or complex needs related to functional status, cognitive ability, or social support system  Outcome: Progressing     Problem: Knowledge Deficit  Goal: Patient/family/caregiver demonstrates understanding of disease process, treatment plan, medications, and discharge instructions  Description: Complete learning assessment and assess knowledge base    Interventions:  - Provide teaching at level of understanding  - Provide teaching via preferred learning methods  Outcome: Progressing     Problem: NEUROSENSORY - ADULT  Goal: Achieves stable or improved neurological status  Description: INTERVENTIONS  - Monitor and report changes in neurological status  - Monitor vital signs such as temperature, blood pressure, glucose, and any other labs ordered   - Initiate measures to prevent increased intracranial pressure  - Monitor for seizure activity and implement precautions if appropriate      Outcome: Progressing  Goal: Remains free of injury related to seizures activity  Description: INTERVENTIONS  - Maintain airway, patient safety  and administer oxygen as ordered  - Monitor patient for seizure activity, document and report duration and description of seizure to physician/advanced practitioner  - If seizure occurs,  ensure patient safety during seizure  - Reorient patient post seizure  - Seizure pads on all 4 side rails  - Instruct patient/family to notify RN of any seizure activity including if an aura is experienced  - Instruct patient/family to call for assistance with activity based on nursing assessment  - Administer anti-seizure medications if ordered    Outcome: Progressing  Goal: Achieves maximal functionality and self care  Description: INTERVENTIONS  - Monitor swallowing and airway patency with patient fatigue and changes in neurological status  - Encourage and assist patient to increase activity and self care     - Encourage visually impaired, hearing impaired and aphasic patients to use assistive/communication devices  Outcome: Progressing     Problem: SKIN/TISSUE INTEGRITY - ADULT  Goal: Skin integrity remains intact  Description: INTERVENTIONS  - Identify patients at risk for skin breakdown  - Assess and monitor skin integrity  - Assess and monitor nutrition and hydration status  - Monitor labs (i e  albumin)  - Assess for incontinence   - Turn and reposition patient  - Assist with mobility/ambulation  - Relieve pressure over bony prominences  - Avoid friction and shearing  - Provide appropriate hygiene as needed including keeping skin clean and dry  - Evaluate need for skin moisturizer/barrier cream  - Collaborate with interdisciplinary team (i e  Nutrition, Rehabilitation, etc )   - Patient/family teaching  Outcome: Progressing  Goal: Incision(s), wounds(s) or drain site(s) healing without S/S of infection  Description: INTERVENTIONS  - Assess and document risk factors for skin impairment   - Assess and document dressing, incision, wound bed, drain sites and surrounding tissue  - Consider nutrition services referral as needed  - Oral mucous membranes remain intact  - Provide patient/ family education  Outcome: Progressing  Goal: Oral mucous membranes remain intact  Description: INTERVENTIONS  - Assess oral mucosa and hygiene practices  - Implement preventative oral hygiene regimen  - Implement oral medicated treatments as ordered  - Initiate Nutrition services referral as needed  Outcome: Progressing

## 2020-09-01 NOTE — ASSESSMENT & PLAN NOTE
- Nondisplaced C2, type II dens fracture  Patient neurological intact and moving all four extremities  - Neurosurgery consulted - non operative management  - maintain cervical collar   - CTA of the head and neck without evidence of vascular injury   - Maintain cervical collar at all times in order [place] collar  - Obtain upright cervical spine x-rays once [place] collar placed  - Monitor neurologic exam   - Initially multimodal analgesic regimen   - PT and OT evaluation extremities indicated

## 2020-09-01 NOTE — ASSESSMENT & PLAN NOTE
- Nondisplaced C1 lateral mass fracture  Patient was neurological intact and moving all four extremities  - Neurosurgery consult for evaluation and recommendations  Anticipate non operative management  - Maintain cervical collar at all times in order [place] collar  - Obtain upright cervical spine x-rays once [place] collar placed
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
## 2020-09-01 NOTE — ASSESSMENT & PLAN NOTE
- Closed, nondisplaced fractures of the right transverse processes of L1, L2 and L3   - Conservative management with bracing as needed for pain

## 2020-09-01 NOTE — ASSESSMENT & PLAN NOTE
- Concussion; unknown if there was loss of concusiousness   - Currently without significant headache  - follow up with trauma as outpatient

## 2020-09-01 NOTE — ASSESSMENT & PLAN NOTE
- CTA "Punctate eccentric filling defect in the right internal carotid artery cervical segment at the C2 level, adjacent to fractures, possibly representing focal intimal injury without evidence of dissection  Similar lesion in the cavernous segment of the right ICA, adjacent to the sphenoid sinus fracture, also suggestive of focal intimal injury without dissection  "  - Neurosurgery consulted  - Start heparin infusion after OR with orthopedics tomorrow   - F/u repeat CTA tomorrow

## 2020-09-02 ENCOUNTER — TELEPHONE (OUTPATIENT)
Dept: NEUROSURGERY | Facility: CLINIC | Age: 30
End: 2020-09-02

## 2020-09-02 LAB
ANION GAP SERPL CALCULATED.3IONS-SCNC: 6 MMOL/L (ref 4–13)
BASOPHILS # BLD AUTO: 0.01 THOUSANDS/ΜL (ref 0–0.1)
BASOPHILS NFR BLD AUTO: 0 % (ref 0–1)
BUN SERPL-MCNC: 10 MG/DL (ref 5–25)
CALCIUM SERPL-MCNC: 8.8 MG/DL (ref 8.3–10.1)
CHLORIDE SERPL-SCNC: 105 MMOL/L (ref 100–108)
CO2 SERPL-SCNC: 28 MMOL/L (ref 21–32)
CREAT SERPL-MCNC: 1.14 MG/DL (ref 0.6–1.3)
EOSINOPHIL # BLD AUTO: 0 THOUSAND/ΜL (ref 0–0.61)
EOSINOPHIL NFR BLD AUTO: 0 % (ref 0–6)
ERYTHROCYTE [DISTWIDTH] IN BLOOD BY AUTOMATED COUNT: 12 % (ref 11.6–15.1)
GFR SERPL CREATININE-BSD FRML MDRD: 85 ML/MIN/1.73SQ M
GLUCOSE SERPL-MCNC: 107 MG/DL (ref 65–140)
GLUCOSE SERPL-MCNC: 130 MG/DL (ref 65–140)
GLUCOSE SERPL-MCNC: 135 MG/DL (ref 65–140)
HCT VFR BLD AUTO: 36.5 % (ref 36.5–49.3)
HGB BLD-MCNC: 13 G/DL (ref 12–17)
IMM GRANULOCYTES # BLD AUTO: 0.02 THOUSAND/UL (ref 0–0.2)
IMM GRANULOCYTES NFR BLD AUTO: 0 % (ref 0–2)
LYMPHOCYTES # BLD AUTO: 0.6 THOUSANDS/ΜL (ref 0.6–4.47)
LYMPHOCYTES NFR BLD AUTO: 7 % (ref 14–44)
MCH RBC QN AUTO: 31.6 PG (ref 26.8–34.3)
MCHC RBC AUTO-ENTMCNC: 35.6 G/DL (ref 31.4–37.4)
MCV RBC AUTO: 89 FL (ref 82–98)
MONOCYTES # BLD AUTO: 0.15 THOUSAND/ΜL (ref 0.17–1.22)
MONOCYTES NFR BLD AUTO: 2 % (ref 4–12)
NEUTROPHILS # BLD AUTO: 7.36 THOUSANDS/ΜL (ref 1.85–7.62)
NEUTS SEG NFR BLD AUTO: 91 % (ref 43–75)
NRBC BLD AUTO-RTO: 0 /100 WBCS
PLATELET # BLD AUTO: 213 THOUSANDS/UL (ref 149–390)
PMV BLD AUTO: 10.3 FL (ref 8.9–12.7)
POTASSIUM SERPL-SCNC: 4.2 MMOL/L (ref 3.5–5.3)
RBC # BLD AUTO: 4.12 MILLION/UL (ref 3.88–5.62)
SODIUM SERPL-SCNC: 139 MMOL/L (ref 136–145)
WBC # BLD AUTO: 8.14 THOUSAND/UL (ref 4.31–10.16)

## 2020-09-02 PROCEDURE — 99233 SBSQ HOSP IP/OBS HIGH 50: CPT | Performed by: SURGERY

## 2020-09-02 PROCEDURE — 82948 REAGENT STRIP/BLOOD GLUCOSE: CPT

## 2020-09-02 PROCEDURE — 85025 COMPLETE CBC W/AUTO DIFF WBC: CPT | Performed by: ORTHOPAEDIC SURGERY

## 2020-09-02 PROCEDURE — 80048 BASIC METABOLIC PNL TOTAL CA: CPT | Performed by: ORTHOPAEDIC SURGERY

## 2020-09-02 PROCEDURE — NC001 PR NO CHARGE: Performed by: ORTHOPAEDIC SURGERY

## 2020-09-02 PROCEDURE — 97167 OT EVAL HIGH COMPLEX 60 MIN: CPT

## 2020-09-02 PROCEDURE — 97163 PT EVAL HIGH COMPLEX 45 MIN: CPT

## 2020-09-02 RX ADMIN — OXYCODONE HYDROCHLORIDE 10 MG: 10 TABLET ORAL at 08:04

## 2020-09-02 RX ADMIN — OXYCODONE HYDROCHLORIDE 10 MG: 10 TABLET ORAL at 16:31

## 2020-09-02 RX ADMIN — HYDROMORPHONE HYDROCHLORIDE 0.5 MG: 1 INJECTION, SOLUTION INTRAMUSCULAR; INTRAVENOUS; SUBCUTANEOUS at 18:07

## 2020-09-02 RX ADMIN — LEVETIRACETAM 500 MG: 100 INJECTION, SOLUTION INTRAVENOUS at 22:17

## 2020-09-02 RX ADMIN — METHOCARBAMOL TABLETS 750 MG: 750 TABLET, COATED ORAL at 18:07

## 2020-09-02 RX ADMIN — SENNOSIDES 17.2 MG: 8.6 TABLET, FILM COATED ORAL at 08:51

## 2020-09-02 RX ADMIN — ACETAMINOPHEN 975 MG: 325 TABLET, FILM COATED ORAL at 22:08

## 2020-09-02 RX ADMIN — OXYCODONE HYDROCHLORIDE 10 MG: 10 TABLET ORAL at 23:05

## 2020-09-02 RX ADMIN — DOCUSATE SODIUM 100 MG: 100 CAPSULE, LIQUID FILLED ORAL at 18:07

## 2020-09-02 RX ADMIN — NEOMYCIN SULFATE, POLYMYXIN B SULFATE AND DEXAMETHASONE 1 DROP: 3.5; 10000; 1 SUSPENSION OPHTHALMIC at 22:10

## 2020-09-02 RX ADMIN — LEVETIRACETAM 500 MG: 100 INJECTION, SOLUTION INTRAVENOUS at 08:53

## 2020-09-02 RX ADMIN — HEPARIN SODIUM 5000 UNITS: 5000 INJECTION INTRAVENOUS; SUBCUTANEOUS at 12:25

## 2020-09-02 RX ADMIN — CARBAMIDE PEROXIDE 6.5% 5 DROP: 6.5 LIQUID AURICULAR (OTIC) at 08:51

## 2020-09-02 RX ADMIN — HEPARIN SODIUM 5000 UNITS: 5000 INJECTION INTRAVENOUS; SUBCUTANEOUS at 05:52

## 2020-09-02 RX ADMIN — NEOMYCIN SULFATE, POLYMYXIN B SULFATE AND DEXAMETHASONE 1 DROP: 3.5; 10000; 1 SUSPENSION OPHTHALMIC at 08:51

## 2020-09-02 RX ADMIN — NEOMYCIN SULFATE, POLYMYXIN B SULFATE AND DEXAMETHASONE 1 DROP: 3.5; 10000; 1 SUSPENSION OPHTHALMIC at 12:27

## 2020-09-02 RX ADMIN — CEFAZOLIN SODIUM 2000 MG: 2 SOLUTION INTRAVENOUS at 05:53

## 2020-09-02 RX ADMIN — OXYCODONE HYDROCHLORIDE 10 MG: 10 TABLET ORAL at 12:26

## 2020-09-02 RX ADMIN — DOCUSATE SODIUM 100 MG: 100 CAPSULE, LIQUID FILLED ORAL at 08:51

## 2020-09-02 RX ADMIN — HEPARIN SODIUM 5000 UNITS: 5000 INJECTION INTRAVENOUS; SUBCUTANEOUS at 22:09

## 2020-09-02 RX ADMIN — METHOCARBAMOL TABLETS 750 MG: 750 TABLET, COATED ORAL at 12:25

## 2020-09-02 RX ADMIN — HYDROMORPHONE HYDROCHLORIDE 0.5 MG: 1 INJECTION, SOLUTION INTRAMUSCULAR; INTRAVENOUS; SUBCUTANEOUS at 05:52

## 2020-09-02 RX ADMIN — HEPARIN SODIUM 5000 UNITS: 5000 INJECTION INTRAVENOUS; SUBCUTANEOUS at 00:10

## 2020-09-02 RX ADMIN — DILTIAZEM HYDROCHLORIDE 240 MG: 240 CAPSULE, COATED, EXTENDED RELEASE ORAL at 08:50

## 2020-09-02 RX ADMIN — ACETAMINOPHEN 975 MG: 325 TABLET, FILM COATED ORAL at 12:25

## 2020-09-02 RX ADMIN — NEOMYCIN SULFATE, POLYMYXIN B SULFATE AND DEXAMETHASONE 1 DROP: 3.5; 10000; 1 SUSPENSION OPHTHALMIC at 18:07

## 2020-09-02 RX ADMIN — CARBAMIDE PEROXIDE 6.5% 5 DROP: 6.5 LIQUID AURICULAR (OTIC) at 18:07

## 2020-09-02 RX ADMIN — ACETAMINOPHEN 975 MG: 325 TABLET, FILM COATED ORAL at 05:52

## 2020-09-02 RX ADMIN — METHOCARBAMOL TABLETS 750 MG: 750 TABLET, COATED ORAL at 05:52

## 2020-09-02 NOTE — ASSESSMENT & PLAN NOTE
- Closed, nondisplaced fractures of the right transverse processes of L1, L2 and L3   - Conservative management with bracing as needed for pain    - pain control

## 2020-09-02 NOTE — ASSESSMENT & PLAN NOTE
- Right facial / periorbital facial contusion  Vision grossly intact with symmetrical pupillary response

## 2020-09-02 NOTE — ASSESSMENT & PLAN NOTE
- Closed left distal radius fracture  - s/p ORIF on 9/1  NWEMILY ALLENE to continue     - pain control  - f/u with orthopedics as outpatient
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
## 2020-09-02 NOTE — ASSESSMENT & PLAN NOTE
- Stable traumatic right-sided SAH   - Neurosurgery Consult for evaluation and recommendations - non-operative management   - GCS 15, non-focal exam  - Keppra x 7 days for ppx  - Avoid all antiplatelet/anticoagulant medication   - f/u with neurosurgery in 2 weeks with repeat CT head at that time

## 2020-09-02 NOTE — ASSESSMENT & PLAN NOTE
- Nondisplaced C1 lateral mass fracture  Patient was neurological intact and moving all four extremities  - Neurosurgery consult for evaluation and recommendations  Anticipate non operative management  - Maintain cervical collar at all times in order [place] collar    - Upright x-rays of cervical spine show stability  - f/u with neurosurgery as an outpatient in 2 weeks with upright cervical spine x-rays
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
## 2020-09-02 NOTE — ASSESSMENT & PLAN NOTE
- Closed right wrist fracture dislocation status post closed reduction and splinting under conscious sedation in trauma bay  - s/p ORIF on 9/1  NWB RUE to continue     - pain control  - f/u with orthopedics as outpatient

## 2020-09-02 NOTE — TELEMEDICINE
Pt s/p orthopedic procedure  Last CT stable  Currently no recommended anticoagulation  Ct was ordered for post op  Patient with reported GCS 15 and good exam  If exam is stable, repeat CT not necessary at this time  This was discussed with Dahlia Kay

## 2020-09-02 NOTE — SOCIAL WORK
CM met with pt and his spouse to discuss the role of CM  Pt lives with his spouse in a 2 story home which has 7STE and 13 steps inside  Pt works, drives, and was fully independent PTA  Pt owns no DME or living will  Pt's pharmacy is [facility] on [address] [facility]  Pt has no hx of mental health, substance abuse, IP rehab, or VNA  Pt is cleared for a home d/c in a day or so  Pt would benefit from another rehab session  Pt will need a BSC upon d/c  Pt would like to use Meds to Beds  CM reviewed d/c planning process including the following: identifying help at home, patient preference for d/c planning needs, Discharge Lounge, [facility] Meds to Bed program, availability of treatment team to discuss questions or concerns patient and/or family may have regarding understanding medications and recognizing signs and symptoms once discharged  CM also encouraged patient to follow up with all recommended appointments after discharge  Patient advised of importance for patient and family to participate in managing patients medical well being

## 2020-09-02 NOTE — ASSESSMENT & PLAN NOTE
- Nondisplaced C2, type II dens fracture  Patient neurological intact and moving all four extremities    - Neurosurgery consulted - non operative management  - maintain cervical collar   - Patient has no neuro deficits  - pain control  - f/u with neurosurgery in 2 weeks with repeat upright cervical spine x-rays at that time

## 2020-09-02 NOTE — PROGRESS NOTES
Subjective: No acute events overnight  No acute distress  Resting comfortably in bed    Objective:  A 10 point ROS was performed; negative except as noted above       Lab Results   Component Value Date/Time    WBC 7 95 09/01/2020 08:14 PM    HGB 13 8 09/01/2020 08:14 PM       Vitals:    09/02/20 0300   BP: 128/65   Pulse: 69   Resp:    Temp:    SpO2: 99%     Bilateral upper extremity  Splint C/D/I  Motor grossly intact to median, radial and ulnar nerve distributions  Sensation intact to light touch in m/r/u/mc/ax distributions  Digits warm and well perfused with brisk capillary refill     Assessment: 31 y o  male POD 1 ORIF BL Distal Radius    Plan:  NWB BL UE in splint  Pain control  DVT ppx: Per primary team  PT/OT  Will continue to assess for acute blood loss anemia  Dispo: Ortho will follow
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
## 2020-09-02 NOTE — PLAN OF CARE
Problem: Prexisting or High Potential for Compromised Skin Integrity  Goal: Skin integrity is maintained or improved  Description: INTERVENTIONS:  - Identify patients at risk for skin breakdown  - Assess and monitor skin integrity  - Assess and monitor nutrition and hydration status  - Monitor labs   - Assess for incontinence   - Turn and reposition patient  - Assist with mobility/ambulation  - Relieve pressure over bony prominences  - Avoid friction and shearing  - Provide appropriate hygiene as needed including keeping skin clean and dry  - Evaluate need for skin moisturizer/barrier cream  - Collaborate with interdisciplinary team   - Patient/family teaching  - Consider wound care consult   Outcome: Progressing     Problem: Potential for Falls  Goal: Patient will remain free of falls  Description: INTERVENTIONS:  - Assess patient frequently for physical needs  -  Identify cognitive and physical deficits and behaviors that affect risk of falls    -  [place] fall precautions as indicated by assessment   - Educate patient/family on patient safety including physical limitations  - Instruct patient to call for assistance with activity based on assessment  - Modify environment to reduce risk of injury  - Consider OT/PT consult to assist with strengthening/mobility  Outcome: Progressing     Problem: INFECTION - ADULT  Goal: Absence or prevention of progression during hospitalization  Description: INTERVENTIONS:  - Assess and monitor for signs and symptoms of infection  - Monitor lab/diagnostic results  - Monitor all insertion sites, i e  indwelling lines, tubes, and drains  - Monitor endotracheal if appropriate and nasal secretions for changes in amount and color  - [place] appropriate cooling/warming therapies per order  - Administer medications as ordered  - Instruct and encourage patient and family to use good hand hygiene technique  - Identify and instruct in appropriate isolation precautions for identified infection/condition  Outcome: Progressing  Goal: Absence of fever/infection during neutropenic period  Description: INTERVENTIONS:  - Monitor WBC    Outcome: Progressing     Problem: SAFETY ADULT  Goal: Patient will remain free of falls  Description: INTERVENTIONS:  - Assess patient frequently for physical needs  -  Identify cognitive and physical deficits and behaviors that affect risk of falls    -  [place] fall precautions as indicated by assessment   - Educate patient/family on patient safety including physical limitations  - Instruct patient to call for assistance with activity based on assessment  - Modify environment to reduce risk of injury  - Consider OT/PT consult to assist with strengthening/mobility  Outcome: Progressing  Goal: Maintain or return to baseline ADL function  Description: INTERVENTIONS:  -  Assess patient's ability to carry out ADLs; assess patient's baseline for ADL function and identify physical deficits which impact ability to perform ADLs (bathing, care of mouth/teeth, toileting, grooming, dressing, etc )  - Assess/evaluate cause of self-care deficits   - Assess range of motion  - Assess patient's mobility; develop plan if impaired  - Assess patient's need for assistive devices and provide as appropriate  - Encourage maximum independence but intervene and supervise when necessary  - Involve family in performance of ADLs  - Assess for home care needs following discharge   - Consider OT consult to assist with ADL evaluation and planning for discharge  - Provide patient education as appropriate  Outcome: Progressing  Goal: Maintain or return mobility status to optimal level  Description: INTERVENTIONS:  - Assess patient's baseline mobility status (ambulation, transfers, stairs, etc )    - Identify cognitive and physical deficits and behaviors that affect mobility  - Identify mobility aids required to assist with transfers and/or ambulation (gait belt, sit-to-stand, lift, walker, cane, etc )  - [place] fall precautions as indicated by assessment  - Record patient progress and toleration of activity level on Mobility SBAR; progress patient to next Phase/Stage  - Instruct patient to call for assistance with activity based on assessment  - Consider rehabilitation consult to assist with strengthening/weightbearing, etc   Outcome: Progressing     Problem: DISCHARGE PLANNING  Goal: Discharge to home or other facility with appropriate resources  Description: INTERVENTIONS:  - Identify barriers to discharge w/patient and caregiver  - Arrange for needed discharge resources and transportation as appropriate  - Identify discharge learning needs (meds, wound care, etc )  - Arrange for interpretive services to assist at discharge as needed  - Refer to Case Management Department for coordinating discharge planning if the patient needs post-hospital services based on physician/advanced practitioner order or complex needs related to functional status, cognitive ability, or social support system  Outcome: Progressing     Problem: Knowledge Deficit  Goal: Patient/family/caregiver demonstrates understanding of disease process, treatment plan, medications, and discharge instructions  Description: Complete learning assessment and assess knowledge base    Interventions:  - Provide teaching at level of understanding  - Provide teaching via preferred learning methods  Outcome: Progressing     Problem: NEUROSENSORY - ADULT  Goal: Achieves stable or improved neurological status  Description: INTERVENTIONS  - Monitor and report changes in neurological status  - Monitor vital signs such as temperature, blood pressure, glucose, and any other labs ordered   - Initiate measures to prevent increased intracranial pressure  - Monitor for seizure activity and implement precautions if appropriate      Outcome: Progressing  Goal: Remains free of injury related to seizures activity  Description: INTERVENTIONS  - Maintain airway, patient safety  and administer oxygen as ordered  - Monitor patient for seizure activity, document and report duration and description of seizure to physician/advanced practitioner  - If seizure occurs,  ensure patient safety during seizure  - Reorient patient post seizure  - Seizure pads on all 4 side rails  - Instruct patient/family to notify RN of any seizure activity including if an aura is experienced  - Instruct patient/family to call for assistance with activity based on nursing assessment  - Administer anti-seizure medications if ordered    Outcome: Progressing  Goal: Achieves maximal functionality and self care  Description: INTERVENTIONS  - Monitor swallowing and airway patency with patient fatigue and changes in neurological status  - Encourage and assist patient to increase activity and self care     - Encourage visually impaired, hearing impaired and aphasic patients to use assistive/communication devices  Outcome: Progressing     Problem: SKIN/TISSUE INTEGRITY - ADULT  Goal: Skin integrity remains intact  Description: INTERVENTIONS  - Identify patients at risk for skin breakdown  - Assess and monitor skin integrity  - Assess and monitor nutrition and hydration status  - Monitor labs (i e  albumin)  - Assess for incontinence   - Turn and reposition patient  - Assist with mobility/ambulation  - Relieve pressure over bony prominences  - Avoid friction and shearing  - Provide appropriate hygiene as needed including keeping skin clean and dry  - Evaluate need for skin moisturizer/barrier cream  - Collaborate with interdisciplinary team (i e  Nutrition, Rehabilitation, etc )   - Patient/family teaching  Outcome: Progressing  Goal: Incision(s), wounds(s) or drain site(s) healing without S/S of infection  Description: INTERVENTIONS  - Assess and document risk factors for skin impairment   - Assess and document dressing, incision, wound bed, drain sites and surrounding tissue  - Consider nutrition services referral as needed  - Oral mucous membranes remain intact  - Provide patient/ family education  Outcome: Progressing  Goal: Oral mucous membranes remain intact  Description: INTERVENTIONS  - Assess oral mucosa and hygiene practices  - Implement preventative oral hygiene regimen  - Implement oral medicated treatments as ordered  - Initiate Nutrition services referral as needed  Outcome: Progressing

## 2020-09-02 NOTE — TELEPHONE ENCOUNTER
09/02/2020-PT STILL IN HOSPITAL  PLEASE SEE DUPLICATE CHART GIANCARLO#[id number]      ----- Message from Romayne Russell, PA-C sent at 9/1/2020 12:47 PM EDT -----  Can you please schedule a 2 week hospital follow up with CT head and xray cervical spine prior? Thanks!

## 2020-09-02 NOTE — ASSESSMENT & PLAN NOTE
- Non displaced right inferior temporal bone  - Non-operative management per OMFS/Optho   - Decreased hearing B/L with visible R hemotympanum   Will consult ENT and continue ear gtts on the R

## 2020-09-02 NOTE — ASSESSMENT & PLAN NOTE
- Multiple facial fractures as follows: There is a nondisplaced fracture of the lateral right maxillary wall extending to the right lateral orbital wall  This fracture also extends into the right sphenoid sinus with fluid opacification of the sinus consistent with hematoma  Additionally, the right-sided facial fractures extending to the right frontal bone with sinus involvement   - OMFS recommends no operative interventions  Continue sinus precautions     - Optho consulted -  Maxitrol drops x 10 days   - Monitor neurological exam
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
## 2020-09-02 NOTE — ASSESSMENT & PLAN NOTE
- CTA "Punctate eccentric filling defect in the right internal carotid artery cervical segment at the C2 level, adjacent to fractures, possibly representing focal intimal injury without evidence of dissection  Similar lesion in the cavernous segment of the right ICA, adjacent to the sphenoid sinus fracture, also suggestive of focal intimal injury without dissection  "  - Neurosurgery consulted  - Repeat CTA shows no residual flow limiting stenosis  No need for heparinization per neurosurgery and no repeat f/u imaging recommended

## 2020-09-03 ENCOUNTER — TELEPHONE (OUTPATIENT)
Dept: OBGYN CLINIC | Facility: HOSPITAL | Age: 30
End: 2020-09-03

## 2020-09-03 VITALS
OXYGEN SATURATION: 93 % | WEIGHT: 156.31 LBS | TEMPERATURE: 99.1 F | SYSTOLIC BLOOD PRESSURE: 141 MMHG | BODY MASS INDEX: 24.53 KG/M2 | DIASTOLIC BLOOD PRESSURE: 80 MMHG | RESPIRATION RATE: 18 BRPM | HEART RATE: 76 BPM | HEIGHT: 67 IN

## 2020-09-03 LAB
ANION GAP SERPL CALCULATED.3IONS-SCNC: 7 MMOL/L (ref 4–13)
BUN SERPL-MCNC: 13 MG/DL (ref 5–25)
CALCIUM SERPL-MCNC: 8.4 MG/DL (ref 8.3–10.1)
CHLORIDE SERPL-SCNC: 95 MMOL/L (ref 100–108)
CO2 SERPL-SCNC: 29 MMOL/L (ref 21–32)
CREAT SERPL-MCNC: 0.95 MG/DL (ref 0.6–1.3)
GFR SERPL CREATININE-BSD FRML MDRD: 106 ML/MIN/1.73SQ M
GLUCOSE SERPL-MCNC: 110 MG/DL (ref 65–140)
POTASSIUM SERPL-SCNC: 3.5 MMOL/L (ref 3.5–5.3)
SODIUM SERPL-SCNC: 131 MMOL/L (ref 136–145)

## 2020-09-03 PROCEDURE — 97530 THERAPEUTIC ACTIVITIES: CPT

## 2020-09-03 PROCEDURE — 80048 BASIC METABOLIC PNL TOTAL CA: CPT | Performed by: ORTHOPAEDIC SURGERY

## 2020-09-03 PROCEDURE — NC001 PR NO CHARGE: Performed by: ORTHOPAEDIC SURGERY

## 2020-09-03 PROCEDURE — 99238 HOSP IP/OBS DSCHRG MGMT 30/<: CPT | Performed by: SURGERY

## 2020-09-03 PROCEDURE — 99223 1ST HOSP IP/OBS HIGH 75: CPT | Performed by: NURSE PRACTITIONER

## 2020-09-03 PROCEDURE — 97116 GAIT TRAINING THERAPY: CPT

## 2020-09-03 RX ORDER — DOCUSATE SODIUM 100 MG/1
100 CAPSULE, LIQUID FILLED ORAL 2 TIMES DAILY
Qty: 10 CAPSULE | Refills: 0 | Status: SHIPPED | OUTPATIENT
Start: 2020-09-03 | End: 2020-09-15

## 2020-09-03 RX ORDER — OXYCODONE HYDROCHLORIDE 5 MG/1
TABLET ORAL
Qty: 30 TABLET | Refills: 0 | Status: SHIPPED | OUTPATIENT
Start: 2020-09-03 | End: 2020-09-28

## 2020-09-03 RX ORDER — METHOCARBAMOL 750 MG/1
750 TABLET, FILM COATED ORAL EVERY 6 HOURS SCHEDULED
Qty: 60 TABLET | Refills: 0 | Status: SHIPPED | OUTPATIENT
Start: 2020-09-03 | End: 2020-09-15

## 2020-09-03 RX ORDER — LEVETIRACETAM 500 MG/1
500 TABLET ORAL EVERY 12 HOURS SCHEDULED
Qty: 10 TABLET | Refills: 0 | Status: SHIPPED | OUTPATIENT
Start: 2020-09-03 | End: 2020-09-15

## 2020-09-03 RX ORDER — ACETAMINOPHEN 325 MG/1
975 TABLET ORAL EVERY 8 HOURS SCHEDULED
Qty: 30 TABLET | Refills: 0 | Status: SHIPPED | OUTPATIENT
Start: 2020-09-03 | End: 2020-09-28

## 2020-09-03 RX ORDER — SENNOSIDES 8.6 MG
2 TABLET ORAL DAILY
Qty: 120 EACH | Refills: 0 | Status: SHIPPED | OUTPATIENT
Start: 2020-09-04 | End: 2020-09-15

## 2020-09-03 RX ORDER — POLYETHYLENE GLYCOL 3350 17 G/17G
17 POWDER, FOR SOLUTION ORAL DAILY PRN
Qty: 14 EACH | Refills: 0 | Status: SHIPPED | OUTPATIENT
Start: 2020-09-03 | End: 2020-09-15

## 2020-09-03 RX ADMIN — OXYCODONE HYDROCHLORIDE 10 MG: 10 TABLET ORAL at 05:25

## 2020-09-03 RX ADMIN — METHOCARBAMOL TABLETS 750 MG: 750 TABLET, COATED ORAL at 13:07

## 2020-09-03 RX ADMIN — METHOCARBAMOL TABLETS 750 MG: 750 TABLET, COATED ORAL at 00:39

## 2020-09-03 RX ADMIN — METHOCARBAMOL TABLETS 750 MG: 750 TABLET, COATED ORAL at 05:22

## 2020-09-03 RX ADMIN — DOCUSATE SODIUM 100 MG: 100 CAPSULE, LIQUID FILLED ORAL at 10:18

## 2020-09-03 RX ADMIN — NEOMYCIN SULFATE, POLYMYXIN B SULFATE AND DEXAMETHASONE 1 DROP: 3.5; 10000; 1 SUSPENSION OPHTHALMIC at 13:06

## 2020-09-03 RX ADMIN — CARBAMIDE PEROXIDE 6.5% 5 DROP: 6.5 LIQUID AURICULAR (OTIC) at 10:19

## 2020-09-03 RX ADMIN — HEPARIN SODIUM 5000 UNITS: 5000 INJECTION INTRAVENOUS; SUBCUTANEOUS at 13:07

## 2020-09-03 RX ADMIN — SENNOSIDES 17.2 MG: 8.6 TABLET, FILM COATED ORAL at 10:19

## 2020-09-03 RX ADMIN — ACETAMINOPHEN 975 MG: 325 TABLET, FILM COATED ORAL at 13:07

## 2020-09-03 RX ADMIN — HEPARIN SODIUM 5000 UNITS: 5000 INJECTION INTRAVENOUS; SUBCUTANEOUS at 05:22

## 2020-09-03 RX ADMIN — NEOMYCIN SULFATE, POLYMYXIN B SULFATE AND DEXAMETHASONE 1 DROP: 3.5; 10000; 1 SUSPENSION OPHTHALMIC at 10:19

## 2020-09-03 RX ADMIN — OXYCODONE HYDROCHLORIDE 10 MG: 10 TABLET ORAL at 10:19

## 2020-09-03 RX ADMIN — ACETAMINOPHEN 975 MG: 325 TABLET, FILM COATED ORAL at 05:22

## 2020-09-03 RX ADMIN — DILTIAZEM HYDROCHLORIDE 240 MG: 240 CAPSULE, COATED, EXTENDED RELEASE ORAL at 10:18

## 2020-09-03 RX ADMIN — LEVETIRACETAM 500 MG: 100 INJECTION, SOLUTION INTRAVENOUS at 10:18

## 2020-09-03 NOTE — PHYSICAL THERAPY NOTE
Physical Therapy Treatment Note     09/03/20 1101   Pain Assessment   Pain Assessment Tool 0-10   Pain Score 8   Pain Location/Orientation Orientation: Bilateral;Location: Neck; Location: Arm;Location: Back   Restrictions/Precautions   Weight Bearing Precautions Per Order Yes   RUE Weight Bearing Per Order NWB   LUE Weight Bearing Per Order NWB   Braces or Orthoses C/S Collar   Other Precautions Fall Risk;Pain;WBS;Spinal precautions;Multiple lines   General   Chart Reviewed Yes   Family/Caregiver Present Yes  (wife present t/o session)   Cognition   Overall Cognitive Status WFL   Subjective   Subjective Pt  semi reclined in bed upon entry  C/S on upon entry  Reported pain however agreeable to particiapte in tehrapy  Reviewed [address] precation with pt  and wife   Bed Mobility   Supine to Sit 5  Supervision   Additional items Assist x 1; Increased time required   Transfers   Sit to Stand 4  Minimal assistance   Additional items Assist x 1; Increased time required;Verbal cues   Stand to Sit 5  Supervision   Additional items Assist x 1; Increased time required   Stand pivot 4  Minimal assistance   Additional items Assist x 1; Increased time required   Ambulation/Elevation   Gait pattern Short stride; Excessively slow; Inconsistent joel;Decreased foot clearance; Improper Weight shift  (B/L knees in flexion)   Gait Assistance 4  Minimal assist   Additional items Assist x 1;Verbal cues   Assistive Device None   Distance 180ft   Stair Management Assistance 5  Supervision   Additional items Assist x 1; Increased time required;Verbal cues   Stair Management Technique Foreward;Nonreciprocal;Alternating pattern; No rails   Number of Stairs 12   Balance   Static Sitting Fair +   Ambulatory Poor +   Endurance Deficit   Endurance Deficit Yes   Endurance Deficit Description Pain   Activity Tolerance   Activity Tolerance Patient limited by pain; Patient tolerated treatment well   Nurse Made Aware yes   Assessment   Prognosis Good   Problem List Decreased strength;Decreased range of motion; Impaired balance;Decreased mobility; Impaired judgement;Pain;Orthopedic restrictions;Decreased skin integrity   Assessment Pt  showed good mobility progress as session progressed  pt  noted with unsteady amb,  initially due to back pain  Cued pt  to improve knee extension during amb  CGA with VCs improved to CS with cues as ambulation distance progressed  pt  maintained NWBing of UEs t/o session  Cues given for LE sequencing to ascend with LLE since pt  reported pain in RLE  Continue to follow pt  during anshul stay to improve safe mobility   Barriers to Discharge None   Goals   Patient Goals I want to get home   STG Expiration Date 09/12/20   PT Treatment Day 1   Plan   Treatment/Interventions Functional transfer training;Elevations; Patient/family training;Bed mobility;Gait training;Spoke to nursing;Family  (PT)   Progress Progressing toward goals   PT Frequency Other (Comment)  (3-6x/week)   Recommendation   PT Discharge Recommendation Other (Comment)  (Home with increased support, home PT )   PT - OK to Discharge Yes         Meghan Delgado, PTA

## 2020-09-03 NOTE — ASSESSMENT & PLAN NOTE
- Non displaced right inferior temporal bone    - Non-operative management per OMFS/Optho   - Decreased hearing B/L with visible R hemotympanum   - ENT evaluated patient and recommends f/u in 4-6 weeks for outpatient audiogram
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
## 2020-09-03 NOTE — PLAN OF CARE
Problem: PHYSICAL THERAPY ADULT  Goal: Performs mobility at highest level of function for planned discharge setting  See evaluation for individualized goals  Description: Treatment/Interventions: Functional transfer training, LE strengthening/ROM, Elevations, Therapeutic exercise, Endurance training, Patient/family training, Equipment eval/education, Bed mobility, Gait training, Spoke to nursing, OT, Family          See flowsheet documentation for full assessment, interventions and recommendations  Outcome: Progressing  Note: Prognosis: Good  Problem List: Decreased strength, Decreased range of motion, Impaired balance, Decreased mobility, Impaired judgement, Pain, Orthopedic restrictions, Decreased skin integrity  Assessment: Pt  showed good mobility progress as session progressed  pt  noted with unsteady amb,  initially due to back pain  Cued pt  to improve knee extension during amb  CGA with VCs improved to CS with cues as ambulation distance progressed  pt  maintained NWBing of UEs t/o session  Cues given for LE sequencing to ascend with LLE since pt  reported pain in RLE  Continue to follow pt  during anshul stay to improve safe mobility  Barriers to Discharge: None     PT Discharge Recommendation: Other (Comment)(Home with increased support, home PT )     PT - OK to Discharge: Yes    See flowsheet documentation for full assessment

## 2020-09-03 NOTE — PLAN OF CARE
Problem: Prexisting or High Potential for Compromised Skin Integrity  Goal: Skin integrity is maintained or improved  Description: INTERVENTIONS:  - Identify patients at risk for skin breakdown  - Assess and monitor skin integrity  - Assess and monitor nutrition and hydration status  - Monitor labs   - Assess for incontinence   - Turn and reposition patient  - Assist with mobility/ambulation  - Relieve pressure over bony prominences  - Avoid friction and shearing  - Provide appropriate hygiene as needed including keeping skin clean and dry  - Evaluate need for skin moisturizer/barrier cream  - Collaborate with interdisciplinary team   - Patient/family teaching  - Consider wound care consult   Outcome: Progressing     Problem: Potential for Falls  Goal: Patient will remain free of falls  Description: INTERVENTIONS:  - Assess patient frequently for physical needs  -  Identify cognitive and physical deficits and behaviors that affect risk of falls    -  [place] fall precautions as indicated by assessment   - Educate patient/family on patient safety including physical limitations  - Instruct patient to call for assistance with activity based on assessment  - Modify environment to reduce risk of injury  - Consider OT/PT consult to assist with strengthening/mobility  Outcome: Progressing     Problem: INFECTION - ADULT  Goal: Absence or prevention of progression during hospitalization  Description: INTERVENTIONS:  - Assess and monitor for signs and symptoms of infection  - Monitor lab/diagnostic results  - Monitor all insertion sites, i e  indwelling lines, tubes, and drains  - Monitor endotracheal if appropriate and nasal secretions for changes in amount and color  - [place] appropriate cooling/warming therapies per order  - Administer medications as ordered  - Instruct and encourage patient and family to use good hand hygiene technique  - Identify and instruct in appropriate isolation precautions for identified infection/condition  Outcome: Progressing  Goal: Absence of fever/infection during neutropenic period  Description: INTERVENTIONS:  - Monitor WBC    Outcome: Progressing     Problem: SAFETY ADULT  Goal: Patient will remain free of falls  Description: INTERVENTIONS:  - Assess patient frequently for physical needs  -  Identify cognitive and physical deficits and behaviors that affect risk of falls    -  [place] fall precautions as indicated by assessment   - Educate patient/family on patient safety including physical limitations  - Instruct patient to call for assistance with activity based on assessment  - Modify environment to reduce risk of injury  - Consider OT/PT consult to assist with strengthening/mobility  Outcome: Progressing  Goal: Maintain or return to baseline ADL function  Description: INTERVENTIONS:  -  Assess patient's ability to carry out ADLs; assess patient's baseline for ADL function and identify physical deficits which impact ability to perform ADLs (bathing, care of mouth/teeth, toileting, grooming, dressing, etc )  - Assess/evaluate cause of self-care deficits   - Assess range of motion  - Assess patient's mobility; develop plan if impaired  - Assess patient's need for assistive devices and provide as appropriate  - Encourage maximum independence but intervene and supervise when necessary  - Involve family in performance of ADLs  - Assess for home care needs following discharge   - Consider OT consult to assist with ADL evaluation and planning for discharge  - Provide patient education as appropriate  Outcome: Progressing  Goal: Maintain or return mobility status to optimal level  Description: INTERVENTIONS:  - Assess patient's baseline mobility status (ambulation, transfers, stairs, etc )    - Identify cognitive and physical deficits and behaviors that affect mobility  - Identify mobility aids required to assist with transfers and/or ambulation (gait belt, sit-to-stand, lift, walker, cane, etc )  - [place] fall precautions as indicated by assessment  - Record patient progress and toleration of activity level on Mobility SBAR; progress patient to next Phase/Stage  - Instruct patient to call for assistance with activity based on assessment  - Consider rehabilitation consult to assist with strengthening/weightbearing, etc   Outcome: Progressing     Problem: DISCHARGE PLANNING  Goal: Discharge to home or other facility with appropriate resources  Description: INTERVENTIONS:  - Identify barriers to discharge w/patient and caregiver  - Arrange for needed discharge resources and transportation as appropriate  - Identify discharge learning needs (meds, wound care, etc )  - Arrange for interpretive services to assist at discharge as needed  - Refer to Case Management Department for coordinating discharge planning if the patient needs post-hospital services based on physician/advanced practitioner order or complex needs related to functional status, cognitive ability, or social support system  Outcome: Progressing     Problem: Knowledge Deficit  Goal: Patient/family/caregiver demonstrates understanding of disease process, treatment plan, medications, and discharge instructions  Description: Complete learning assessment and assess knowledge base    Interventions:  - Provide teaching at level of understanding  - Provide teaching via preferred learning methods  Outcome: Progressing     Problem: NEUROSENSORY - ADULT  Goal: Achieves stable or improved neurological status  Description: INTERVENTIONS  - Monitor and report changes in neurological status  - Monitor vital signs such as temperature, blood pressure, glucose, and any other labs ordered   - Initiate measures to prevent increased intracranial pressure  - Monitor for seizure activity and implement precautions if appropriate      Outcome: Progressing  Goal: Remains free of injury related to seizures activity  Description: INTERVENTIONS  - Maintain airway, patient safety  and administer oxygen as ordered  - Monitor patient for seizure activity, document and report duration and description of seizure to physician/advanced practitioner  - If seizure occurs,  ensure patient safety during seizure  - Reorient patient post seizure  - Seizure pads on all 4 side rails  - Instruct patient/family to notify RN of any seizure activity including if an aura is experienced  - Instruct patient/family to call for assistance with activity based on nursing assessment  - Administer anti-seizure medications if ordered    Outcome: Progressing  Goal: Achieves maximal functionality and self care  Description: INTERVENTIONS  - Monitor swallowing and airway patency with patient fatigue and changes in neurological status  - Encourage and assist patient to increase activity and self care     - Encourage visually impaired, hearing impaired and aphasic patients to use assistive/communication devices  Outcome: Progressing     Problem: SKIN/TISSUE INTEGRITY - ADULT  Goal: Skin integrity remains intact  Description: INTERVENTIONS  - Identify patients at risk for skin breakdown  - Assess and monitor skin integrity  - Assess and monitor nutrition and hydration status  - Monitor labs (i e  albumin)  - Assess for incontinence   - Turn and reposition patient  - Assist with mobility/ambulation  - Relieve pressure over bony prominences  - Avoid friction and shearing  - Provide appropriate hygiene as needed including keeping skin clean and dry  - Evaluate need for skin moisturizer/barrier cream  - Collaborate with interdisciplinary team (i e  Nutrition, Rehabilitation, etc )   - Patient/family teaching  Outcome: Progressing  Goal: Incision(s), wounds(s) or drain site(s) healing without S/S of infection  Description: INTERVENTIONS  - Assess and document risk factors for skin impairment   - Assess and document dressing, incision, wound bed, drain sites and surrounding tissue  - Consider nutrition services referral as needed  - Oral mucous membranes remain intact  - Provide patient/ family education  Outcome: Progressing  Goal: Oral mucous membranes remain intact  Description: INTERVENTIONS  - Assess oral mucosa and hygiene practices  - Implement preventative oral hygiene regimen  - Implement oral medicated treatments as ordered  - Initiate Nutrition services referral as needed  Outcome: Progressing
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
## 2020-09-03 NOTE — SOCIAL WORK
Pt will d/c home today  [facility]  [facility]   provided pt with a commode from [facility]  Pt has no other needs

## 2020-09-03 NOTE — PROGRESS NOTES
Subjective: No acute events overnight  No acute distress  Resting comfortably in bed    Objective:  A 10 point ROS was performed; negative except as noted above       Lab Results   Component Value Date/Time    WBC 8 14 09/02/2020 05:42 AM    HGB 13 0 09/02/2020 05:42 AM       Vitals:    09/03/20 0525   BP:    Pulse:    Resp:    Temp:    SpO2: 93%     Bilateral upper extremity  Splint C/D/I  Motor grossly intact to median, radial and ulnar nerve distributions other than median and radial nerve function in left upper extremity which cannot be properly tested due to splint  Sensation intact to light touch in m/r/u/mc/ax distributions  Digits warm and well perfused with brisk capillary refill     Assessment: 31 y o  male POD 2 ORIF BL Distal Radius    Plan:  NWB BL UE in splints  Pain control  DVT ppx: per primary team  PT/OT  Will continue to assess for acute blood loss anemia  Dispo: Ortho will follow

## 2020-09-04 NOTE — UTILIZATION REVIEW
Notification of Discharge  This is a Notification of Discharge from our facility [address]  Please be advised that this patient has been discharge from our facility  Below you will find the admission and discharge date and time including the patients disposition  PRESENTATION DATE: 8/30/2020  6:40 PM  OBS ADMISSION DATE:   IP ADMISSION DATE: 8/30/20 2001   DISCHARGE DATE: 9/3/2020  3:44 PM  DIS[facility]ITION[facility]   Admission Orders listed below:  Admission Orders (From admission, onward)     Ordered        08/30/20 2029  Inpatient Admission  Once                   Please contact the UR Department if additional information is required to close this patient's authorization/case  [address] Utilization Review Department  Main: [phone] x carefully listen to the prompts  All voicemails are confidential   [email or web address]  Send all requests for admission clinical reviews, approved or denied determinations and any other requests to dedicated fax number below belonging to the campus where the patient is receiving treatment   List of dedicated fax numbers:  [address] DENIALS (Administrative/Medical Necessity) [phone]   [address] (Maternity/NICU/Pediatrics) [phone]   Palmira Lorenzana [phone]   Melvin Roberts [phone]   Nesha Juares [phone]   [address] [phone]   [facility]  [phone]   [address] [facility] [address]  [address] [facility] [address] [facility] [phone]

## 2020-09-05 ENCOUNTER — APPOINTMENT (EMERGENCY)
Dept: CT IMAGING | Facility: HOSPITAL | Age: 30
End: 2020-09-05
Payer: COMMERCIAL

## 2020-09-05 ENCOUNTER — HOSPITAL ENCOUNTER (INPATIENT)
Facility: HOSPITAL | Age: 30
LOS: 7 days | Discharge: HOME HEALTH | DRG: 644 | End: 2020-09-12
Attending: SURGERY | Admitting: SURGERY
Payer: COMMERCIAL

## 2020-09-05 ENCOUNTER — DOCUMENTATION (OUTPATIENT)
Dept: OBGYN CLINIC | Facility: HOSPITAL | Age: 30
End: 2020-09-05

## 2020-09-05 ENCOUNTER — HOSPITAL ENCOUNTER (EMERGENCY)
Facility: HOSPITAL | Age: 30
End: 2020-09-05
Attending: EMERGENCY MEDICINE | Admitting: EMERGENCY MEDICINE
Payer: COMMERCIAL

## 2020-09-05 VITALS
TEMPERATURE: 97.1 F | SYSTOLIC BLOOD PRESSURE: 160 MMHG | HEART RATE: 63 BPM | DIASTOLIC BLOOD PRESSURE: 101 MMHG | OXYGEN SATURATION: 96 % | RESPIRATION RATE: 16 BRPM

## 2020-09-05 DIAGNOSIS — H11.31 SUBCONJUNCTIVAL HEMORRHAGE OF RIGHT EYE: Primary | ICD-10-CM

## 2020-09-05 DIAGNOSIS — S52.501S CLOSED FRACTURE OF DISTAL END OF RIGHT RADIUS, UNSPECIFIED FRACTURE MORPHOLOGY, SEQUELA: ICD-10-CM

## 2020-09-05 DIAGNOSIS — G51.0 FACIAL NERVE PALSY: ICD-10-CM

## 2020-09-05 DIAGNOSIS — E87.1 HYPONATREMIA: Primary | ICD-10-CM

## 2020-09-05 DIAGNOSIS — E87.1 HYPONATREMIA: ICD-10-CM

## 2020-09-05 DIAGNOSIS — E22.2 SIADH (SYNDROME OF INAPPROPRIATE ADH PRODUCTION) (HCC): ICD-10-CM

## 2020-09-05 DIAGNOSIS — S12.041A: ICD-10-CM

## 2020-09-05 DIAGNOSIS — G51.0 BELL PALSY: ICD-10-CM

## 2020-09-05 PROBLEM — R52 PAIN PROVOKED BY TRAUMA: Status: ACTIVE | Noted: 2020-09-05

## 2020-09-05 LAB
ANION GAP SERPL CALCULATED.3IONS-SCNC: 5 MMOL/L (ref 4–13)
ANION GAP SERPL CALCULATED.3IONS-SCNC: 6 MMOL/L (ref 4–13)
ANION GAP SERPL CALCULATED.3IONS-SCNC: 9 MMOL/L (ref 4–13)
ANION GAP SERPL CALCULATED.3IONS-SCNC: 9 MMOL/L (ref 4–13)
BASOPHILS # BLD MANUAL: 0 THOUSAND/UL (ref 0–0.1)
BASOPHILS NFR MAR MANUAL: 0 % (ref 0–1)
BUN SERPL-MCNC: 13 MG/DL (ref 5–25)
CALCIUM SERPL-MCNC: 8.5 MG/DL (ref 8.3–10.1)
CALCIUM SERPL-MCNC: 8.7 MG/DL (ref 8.3–10.1)
CALCIUM SERPL-MCNC: 9.1 MG/DL (ref 8.3–10.1)
CALCIUM SERPL-MCNC: 9.1 MG/DL (ref 8.3–10.1)
CHLORIDE SERPL-SCNC: 85 MMOL/L (ref 100–108)
CHLORIDE SERPL-SCNC: 85 MMOL/L (ref 100–108)
CHLORIDE SERPL-SCNC: 88 MMOL/L (ref 100–108)
CHLORIDE SERPL-SCNC: 89 MMOL/L (ref 100–108)
CO2 SERPL-SCNC: 23 MMOL/L (ref 21–32)
CO2 SERPL-SCNC: 25 MMOL/L (ref 21–32)
CO2 SERPL-SCNC: 26 MMOL/L (ref 21–32)
CO2 SERPL-SCNC: 27 MMOL/L (ref 21–32)
CREAT SERPL-MCNC: 0.88 MG/DL (ref 0.6–1.3)
CREAT SERPL-MCNC: 0.89 MG/DL (ref 0.6–1.3)
CREAT SERPL-MCNC: 0.95 MG/DL (ref 0.6–1.3)
CREAT SERPL-MCNC: 0.96 MG/DL (ref 0.6–1.3)
EOSINOPHIL # BLD MANUAL: 0 THOUSAND/UL (ref 0–0.4)
EOSINOPHIL NFR BLD MANUAL: 0 % (ref 0–6)
ERYTHROCYTE [DISTWIDTH] IN BLOOD BY AUTOMATED COUNT: 11.4 % (ref 11.6–15.1)
GFR SERPL CREATININE-BSD FRML MDRD: 105 ML/MIN/1.73SQ M
GFR SERPL CREATININE-BSD FRML MDRD: 106 ML/MIN/1.73SQ M
GFR SERPL CREATININE-BSD FRML MDRD: 114 ML/MIN/1.73SQ M
GFR SERPL CREATININE-BSD FRML MDRD: 114 ML/MIN/1.73SQ M
GLUCOSE SERPL-MCNC: 109 MG/DL (ref 65–140)
GLUCOSE SERPL-MCNC: 110 MG/DL (ref 65–140)
GLUCOSE SERPL-MCNC: 95 MG/DL (ref 65–140)
GLUCOSE SERPL-MCNC: 99 MG/DL (ref 65–140)
HCT VFR BLD AUTO: 39.3 % (ref 36.5–49.3)
HGB BLD-MCNC: 14.7 G/DL (ref 12–17)
LYMPHOCYTES # BLD AUTO: 1.44 THOUSAND/UL (ref 0.6–4.47)
LYMPHOCYTES # BLD AUTO: 22 % (ref 14–44)
MCH RBC QN AUTO: 31.6 PG (ref 26.8–34.3)
MCHC RBC AUTO-ENTMCNC: 37.4 G/DL (ref 31.4–37.4)
MCV RBC AUTO: 85 FL (ref 82–98)
MONOCYTES # BLD AUTO: 0.65 THOUSAND/UL (ref 0–1.22)
MONOCYTES NFR BLD: 10 % (ref 4–12)
NEUTROPHILS # BLD MANUAL: 4.44 THOUSAND/UL (ref 1.85–7.62)
NEUTS BAND NFR BLD MANUAL: 2 % (ref 0–8)
NEUTS SEG NFR BLD AUTO: 66 % (ref 43–75)
NRBC BLD AUTO-RTO: 0 /100 WBCS
OSMOLALITY UR/SERPL-RTO: 259 MMOL/KG (ref 282–298)
OSMOLALITY UR: 396 MMOL/KG
PLATELET # BLD AUTO: 292 THOUSANDS/UL (ref 149–390)
PLATELET BLD QL SMEAR: ADEQUATE
PMV BLD AUTO: 9.1 FL (ref 8.9–12.7)
POLYCHROMASIA BLD QL SMEAR: PRESENT
POTASSIUM SERPL-SCNC: 4.1 MMOL/L (ref 3.5–5.3)
POTASSIUM SERPL-SCNC: 4.1 MMOL/L (ref 3.5–5.3)
POTASSIUM SERPL-SCNC: 4.2 MMOL/L (ref 3.5–5.3)
POTASSIUM SERPL-SCNC: 4.8 MMOL/L (ref 3.5–5.3)
RBC # BLD AUTO: 4.65 MILLION/UL (ref 3.88–5.62)
SODIUM 24H UR-SCNC: 90 MOL/L
SODIUM SERPL-SCNC: 117 MMOL/L (ref 136–145)
SODIUM SERPL-SCNC: 119 MMOL/L (ref 136–145)
SODIUM SERPL-SCNC: 120 MMOL/L (ref 136–145)
SODIUM SERPL-SCNC: 121 MMOL/L (ref 136–145)
TOTAL CELLS COUNTED SPEC: 100
WBC # BLD AUTO: 6.53 THOUSAND/UL (ref 4.31–10.16)

## 2020-09-05 PROCEDURE — 70496 CT ANGIOGRAPHY HEAD: CPT

## 2020-09-05 PROCEDURE — 99285 EMERGENCY DEPT VISIT HI MDM: CPT

## 2020-09-05 PROCEDURE — 80048 BASIC METABOLIC PNL TOTAL CA: CPT | Performed by: EMERGENCY MEDICINE

## 2020-09-05 PROCEDURE — 70498 CT ANGIOGRAPHY NECK: CPT

## 2020-09-05 PROCEDURE — 83930 ASSAY OF BLOOD OSMOLALITY: CPT | Performed by: EMERGENCY MEDICINE

## 2020-09-05 PROCEDURE — 83935 ASSAY OF URINE OSMOLALITY: CPT | Performed by: EMERGENCY MEDICINE

## 2020-09-05 PROCEDURE — 96375 TX/PRO/DX INJ NEW DRUG ADDON: CPT

## 2020-09-05 PROCEDURE — 85027 COMPLETE CBC AUTOMATED: CPT | Performed by: PHYSICIAN ASSISTANT

## 2020-09-05 PROCEDURE — 36415 COLL VENOUS BLD VENIPUNCTURE: CPT | Performed by: PHYSICIAN ASSISTANT

## 2020-09-05 PROCEDURE — 84300 ASSAY OF URINE SODIUM: CPT | Performed by: EMERGENCY MEDICINE

## 2020-09-05 PROCEDURE — 99222 1ST HOSP IP/OBS MODERATE 55: CPT | Performed by: SURGERY

## 2020-09-05 PROCEDURE — 99285 EMERGENCY DEPT VISIT HI MDM: CPT | Performed by: EMERGENCY MEDICINE

## 2020-09-05 PROCEDURE — 96374 THER/PROPH/DIAG INJ IV PUSH: CPT

## 2020-09-05 PROCEDURE — 80048 BASIC METABOLIC PNL TOTAL CA: CPT | Performed by: PHYSICIAN ASSISTANT

## 2020-09-05 PROCEDURE — 85007 BL SMEAR W/DIFF WBC COUNT: CPT | Performed by: PHYSICIAN ASSISTANT

## 2020-09-05 PROCEDURE — G1004 CDSM NDSC: HCPCS

## 2020-09-05 RX ORDER — SODIUM CHLORIDE 9 MG/ML
150 INJECTION, SOLUTION INTRAVENOUS CONTINUOUS
Status: DISCONTINUED | OUTPATIENT
Start: 2020-09-05 | End: 2020-09-06

## 2020-09-05 RX ORDER — LANOLIN ALCOHOL/MO/W.PET/CERES
6 CREAM (GRAM) TOPICAL
Status: DISCONTINUED | OUTPATIENT
Start: 2020-09-05 | End: 2020-09-12 | Stop reason: HOSPADM

## 2020-09-05 RX ORDER — OXYCODONE HYDROCHLORIDE 5 MG/1
5 TABLET ORAL EVERY 4 HOURS PRN
COMMUNITY
End: 2020-09-28

## 2020-09-05 RX ORDER — MORPHINE SULFATE 4 MG/ML
4 INJECTION, SOLUTION INTRAMUSCULAR; INTRAVENOUS ONCE
Status: COMPLETED | OUTPATIENT
Start: 2020-09-05 | End: 2020-09-05

## 2020-09-05 RX ORDER — DOCUSATE SODIUM 100 MG/1
100 CAPSULE, LIQUID FILLED ORAL 2 TIMES DAILY
COMMUNITY
End: 2020-09-28

## 2020-09-05 RX ORDER — SENNA PLUS 8.6 MG/1
2 TABLET ORAL DAILY
COMMUNITY
End: 2020-09-28

## 2020-09-05 RX ORDER — SENNOSIDES 8.6 MG
2 TABLET ORAL DAILY
Status: DISCONTINUED | OUTPATIENT
Start: 2020-09-05 | End: 2020-09-08

## 2020-09-05 RX ORDER — HYDROMORPHONE HCL/PF 1 MG/ML
0.5 SYRINGE (ML) INJECTION
Status: DISCONTINUED | OUTPATIENT
Start: 2020-09-05 | End: 2020-09-11

## 2020-09-05 RX ORDER — OXYCODONE HYDROCHLORIDE 5 MG/1
5 TABLET ORAL EVERY 4 HOURS PRN
Status: DISCONTINUED | OUTPATIENT
Start: 2020-09-05 | End: 2020-09-12 | Stop reason: HOSPADM

## 2020-09-05 RX ORDER — ACETAMINOPHEN 325 MG/1
975 TABLET ORAL EVERY 8 HOURS PRN
COMMUNITY
End: 2020-10-27 | Stop reason: ALTCHOICE

## 2020-09-05 RX ORDER — POLYETHYLENE GLYCOL 3350 17 G/17G
17 POWDER, FOR SOLUTION ORAL DAILY PRN
Status: DISCONTINUED | OUTPATIENT
Start: 2020-09-05 | End: 2020-09-08

## 2020-09-05 RX ORDER — DILTIAZEM HYDROCHLORIDE 240 MG/1
240 CAPSULE, COATED, EXTENDED RELEASE ORAL DAILY
Status: DISCONTINUED | OUTPATIENT
Start: 2020-09-05 | End: 2020-09-12 | Stop reason: HOSPADM

## 2020-09-05 RX ORDER — OXYCODONE HYDROCHLORIDE 10 MG/1
10 TABLET ORAL EVERY 4 HOURS PRN
Status: DISCONTINUED | OUTPATIENT
Start: 2020-09-05 | End: 2020-09-11

## 2020-09-05 RX ORDER — LEVETIRACETAM 500 MG/1
500 TABLET ORAL EVERY 12 HOURS SCHEDULED
COMMUNITY
End: 2020-09-12 | Stop reason: HOSPADM

## 2020-09-05 RX ORDER — METHOCARBAMOL 750 MG/1
750 TABLET, FILM COATED ORAL EVERY 6 HOURS PRN
COMMUNITY
End: 2020-09-28

## 2020-09-05 RX ORDER — POLYETHYLENE GLYCOL 3350 17 G/17G
17 POWDER, FOR SOLUTION ORAL
COMMUNITY
End: 2020-09-28

## 2020-09-05 RX ORDER — DOCUSATE SODIUM 100 MG/1
100 CAPSULE, LIQUID FILLED ORAL 2 TIMES DAILY
Status: DISCONTINUED | OUTPATIENT
Start: 2020-09-05 | End: 2020-09-12 | Stop reason: HOSPADM

## 2020-09-05 RX ORDER — METHOCARBAMOL 750 MG/1
750 TABLET, FILM COATED ORAL EVERY 6 HOURS PRN
Status: DISCONTINUED | OUTPATIENT
Start: 2020-09-05 | End: 2020-09-11

## 2020-09-05 RX ORDER — LEVETIRACETAM 500 MG/1
500 TABLET ORAL EVERY 12 HOURS SCHEDULED
Status: DISCONTINUED | OUTPATIENT
Start: 2020-09-05 | End: 2020-09-11

## 2020-09-05 RX ORDER — ACETAMINOPHEN 325 MG/1
975 TABLET ORAL EVERY 6 HOURS SCHEDULED
Status: DISCONTINUED | OUTPATIENT
Start: 2020-09-05 | End: 2020-09-12 | Stop reason: HOSPADM

## 2020-09-05 RX ADMIN — METHOCARBAMOL TABLETS 750 MG: 750 TABLET, COATED ORAL at 13:09

## 2020-09-05 RX ADMIN — ACETAMINOPHEN 975 MG: 325 TABLET, FILM COATED ORAL at 18:14

## 2020-09-05 RX ADMIN — CARBAMIDE PEROXIDE 6.5% 5 DROP: 6.5 LIQUID AURICULAR (OTIC) at 23:41

## 2020-09-05 RX ADMIN — MORPHINE SULFATE 2 MG: 2 INJECTION, SOLUTION INTRAMUSCULAR; INTRAVENOUS at 08:13

## 2020-09-05 RX ADMIN — LEVETIRACETAM 500 MG: 500 TABLET, FILM COATED ORAL at 15:09

## 2020-09-05 RX ADMIN — METHOCARBAMOL TABLETS 750 MG: 750 TABLET, COATED ORAL at 23:37

## 2020-09-05 RX ADMIN — SODIUM CHLORIDE 100 ML/HR: 0.9 INJECTION, SOLUTION INTRAVENOUS at 10:55

## 2020-09-05 RX ADMIN — MORPHINE SULFATE 4 MG: 4 INJECTION INTRAVENOUS at 06:54

## 2020-09-05 RX ADMIN — SODIUM CHLORIDE 100 ML/HR: 0.9 INJECTION, SOLUTION INTRAVENOUS at 20:58

## 2020-09-05 RX ADMIN — LEVETIRACETAM 500 MG: 500 TABLET, FILM COATED ORAL at 21:03

## 2020-09-05 RX ADMIN — HYDROMORPHONE HYDROCHLORIDE 0.5 MG: 1 INJECTION, SOLUTION INTRAMUSCULAR; INTRAVENOUS; SUBCUTANEOUS at 20:59

## 2020-09-05 RX ADMIN — OXYCODONE HYDROCHLORIDE 10 MG: 10 TABLET ORAL at 23:38

## 2020-09-05 RX ADMIN — OXYCODONE HYDROCHLORIDE 10 MG: 10 TABLET ORAL at 18:17

## 2020-09-05 RX ADMIN — IOHEXOL 100 ML: 350 INJECTION, SOLUTION INTRAVENOUS at 08:10

## 2020-09-05 RX ADMIN — ENOXAPARIN SODIUM 30 MG: 30 INJECTION SUBCUTANEOUS at 18:15

## 2020-09-05 RX ADMIN — ACETAMINOPHEN 975 MG: 325 TABLET, FILM COATED ORAL at 23:38

## 2020-09-05 RX ADMIN — DOCUSATE SODIUM 100 MG: 100 CAPSULE, LIQUID FILLED ORAL at 18:15

## 2020-09-05 RX ADMIN — MELATONIN 6 MG: at 23:37

## 2020-09-05 RX ADMIN — SENNOSIDES 17.2 MG: 8.6 TABLET, FILM COATED ORAL at 18:15

## 2020-09-05 RX ADMIN — OXYCODONE HYDROCHLORIDE 5 MG: 5 TABLET ORAL at 13:09

## 2020-09-05 NOTE — PLAN OF CARE
Problem: Potential for Falls  Goal: Patient will remain free of falls  Description: INTERVENTIONS:  - Assess patient frequently for physical needs  -  Identify cognitive and physical deficits and behaviors that affect risk of falls    -  [place] fall precautions as indicated by assessment   - Educate patient/family on patient safety including physical limitations  - Instruct patient to call for assistance with activity based on assessment  - Modify environment to reduce risk of injury  - Consider OT/PT consult to assist with strengthening/mobility  Outcome: Progressing     Problem: METABOLIC, FLUID AND ELECTROLYTES - ADULT  Goal: Electrolytes maintained within normal limits  Description: INTERVENTIONS:  - Monitor labs and assess patient for signs and symptoms of electrolyte imbalances  - Administer electrolyte replacement as ordered  - Monitor response to electrolyte replacements, including repeat lab results as appropriate  - Instruct patient on fluid and nutrition as appropriate  Outcome: Progressing  Goal: Fluid balance maintained  Description: INTERVENTIONS:  - Monitor labs   - Monitor I/O and WT  - Instruct patient on fluid and nutrition as appropriate  - Assess for signs & symptoms of volume excess or deficit  Outcome: Progressing     Problem: SKIN/TISSUE INTEGRITY - ADULT  Goal: Incision(s), wounds(s) or drain site(s) healing without S/S of infection  Description: INTERVENTIONS  - Assess and document risk factors for skin impairment   - Assess and document dressing, incision, wound bed, drain sites and surrounding tissue  - Consider nutrition services referral as needed  - Provide patient/ family education  Outcome: Progressing     Problem: MUSCULOSKELETAL - ADULT  Goal: Maintain or return mobility to safest level of function  Description: INTERVENTIONS:  - Assess patient's ability to carry out ADLs; assess patient's baseline for ADL function and identify physical deficits which impact ability to perform ADLs (bathing, care of mouth/teeth, toileting, grooming, dressing, etc )  - Assess/evaluate cause of self-care deficits   - Assess range of motion  - Assess patient's mobility  - Assess patient's need for assistive devices and provide as appropriate  - Encourage maximum independence but intervene and supervise when necessary  - Involve family in performance of ADLs  - Assess for home care needs following discharge   - Consider OT consult to assist with ADL evaluation and planning for discharge  - Provide patient education as appropriate  Outcome: Progressing  Goal: Maintain proper alignment of affected body part  Description: INTERVENTIONS:  - Support, maintain and protect limb and body alignment  - Provide patient/ family with appropriate education  Outcome: Progressing     Problem: PAIN - ADULT  Goal: Verbalizes/displays adequate comfort level or baseline comfort level  Description: Interventions:  - Encourage patient to monitor pain and request assistance  - Assess pain using appropriate pain scale  - Administer analgesics based on type and severity of pain and evaluate response  - Implement non-pharmacological measures as appropriate and evaluate response  - Consider cultural and social influences on pain and pain management  - Notify physician/advanced practitioner if interventions unsuccessful or patient reports new pain  Outcome: Progressing     Problem: SAFETY ADULT  Goal: Patient will remain free of falls  Description: INTERVENTIONS:  - Assess patient frequently for physical needs  -  Identify cognitive and physical deficits and behaviors that affect risk of falls    -  [place] fall precautions as indicated by assessment   - Educate patient/family on patient safety including physical limitations  - Instruct patient to call for assistance with activity based on assessment  - Modify environment to reduce risk of injury  - Consider OT/PT consult to assist with strengthening/mobility  Outcome: Progressing  Goal: Maintain or return to baseline ADL function  Description: INTERVENTIONS:  -  Assess patient's ability to carry out ADLs; assess patient's baseline for ADL function and identify physical deficits which impact ability to perform ADLs (bathing, care of mouth/teeth, toileting, grooming, dressing, etc )  - Assess/evaluate cause of self-care deficits   - Assess range of motion  - Assess patient's mobility; develop plan if impaired  - Assess patient's need for assistive devices and provide as appropriate  - Encourage maximum independence but intervene and supervise when necessary  - Involve family in performance of ADLs  - Assess for home care needs following discharge   - Consider OT consult to assist with ADL evaluation and planning for discharge  - Provide patient education as appropriate  Outcome: Progressing  Goal: Maintain or return mobility status to optimal level  Description: INTERVENTIONS:  - Assess patient's baseline mobility status (ambulation, transfers, stairs, etc )    - Identify cognitive and physical deficits and behaviors that affect mobility  - Identify mobility aids required to assist with transfers and/or ambulation (gait belt, sit-to-stand, lift, walker, cane, etc )  - [place] fall precautions as indicated by assessment  - Record patient progress and toleration of activity level on Mobility SBAR; progress patient to next Phase/Stage  - Instruct patient to call for assistance with activity based on assessment  - Consider rehabilitation consult to assist with strengthening/weightbearing, etc   Outcome: Progressing     Problem: DISCHARGE PLANNING  Goal: Discharge to home or other facility with appropriate resources  Description: INTERVENTIONS:  - Identify barriers to discharge w/patient and caregiver  - Arrange for needed discharge resources and transportation as appropriate  - Identify discharge learning needs (meds, wound care, etc )  - Arrange for interpretive services to assist at discharge as needed  - Refer to Case Management Department for coordinating discharge planning if the patient needs post-hospital services based on physician/advanced practitioner order or complex needs related to functional status, cognitive ability, or social support system  Outcome: Progressing     Problem: Knowledge Deficit  Goal: Patient/family/caregiver demonstrates understanding of disease process, treatment plan, medications, and discharge instructions  Description: Complete learning assessment and assess knowledge base    Interventions:  - Provide teaching at level of understanding  - Provide teaching via preferred learning methods  Outcome: Progressing
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
## 2020-09-05 NOTE — EMTALA/ACUTE CARE TRANSFER
[facility]  [address] [facility] [address]  Dept: [phone]      EMTALA TRANSFER CONSENT    NAME Racheal iLzama                                         1990                              MRN [id number]    I have been informed of my rights regarding examination, treatment, and transfer   by Dr Johnna Armas, *    Benefits: Specialized equipment and/or services available at the receiving facility (Include comment)________________________, Continuity of care(trauma)    Risks: Potential for delay in receiving treatment, Potential deterioration of medical condition, Loss of IV, Increased discomfort during transfer, Possible worsening of condition or death during transfer      Consent for Transfer:  I acknowledge that my medical condition has been evaluated and explained to me by the emergency department physician or other qualified medical person and/or my attending physician, who has recommended that I be transferred to the service of  Accepting Physician: Dr Kari Mcconnell at [address] [facility] Name, [address] : [facility]  The above potential benefits of such transfer, the potential risks associated with such transfer, and the probable risks of not being transferred have been explained to me, and I fully understand them  The doctor has explained that, in my case, the benefits of transfer outweigh the risks  I agree to be transferred  I authorize the performance of emergency medical procedures and treatments upon me in both transit and upon arrival at the receiving facility  Additionally, I authorize the release of any and all medical records to the receiving facility and request they be transported with me, if possible  I understand that the safest mode of transportation during a medical emergency is an ambulance and that the Hospital advocates the use of this mode of transport   Risks of traveling to the receiving facility by car, including absence of medical control, life sustaining equipment, such as oxygen, and medical personnel has been explained to me and I fully understand them  (LUKASZ CORRECT BOX BELOW)  [ x ]  I consent to the stated transfer and to be transported by ambulance/helicopter  [  ]  I consent to the stated transfer, but refuse transportation by ambulance and accept full responsibility for my transportation by car  I understand the risks of non-ambulance transfers and I exonerate the Hospital and its staff from any deterioration in my condition that results from this refusal     X___________________________________________    DATE  20  TIME________  Signature of patient or legally responsible individual signing on patient behalf           RELATIONSHIP TO PATIENT_________________________          Provider Certification    NAME Edvin Anaya                                        [facility] 1990                              MRN [id number]    A medical screening exam was performed on the above named patient  Based on the examination:    Condition Necessitating Transfer The primary encounter diagnosis was Hyponatremia  A diagnosis of SIADH (syndrome of inappropriate ADH production) ([facility] [address] ) was also pertinent to this visit      Patient Condition: The patient has been stabilized such that within reasonable medical probability, no material deterioration of the patient condition or the condition of the unborn child(meg) is likely to result from the transfer    Reason for Transfer: Level of Care needed not available at this facility    Transfer Requirements: [facility]   · Space available and qualified personnel available for treatment as acknowledged by  PACS  · Agreed to accept transfer and to provide appropriate medical treatment as acknowledged by       Dr Cindy Izaguirre  · Appropriate medical records of the examination and treatment of the patient are provided at the time of transfer   [address] [facility] COMPLETED _______  · Transfer will be performed by qualified personnel from    and appropriate transfer equipment as required, including the use of necessary and appropriate life support measures  Provider Certification: I have examined the patient and explained the following risks and benefits of being transferred/refusing transfer to the patient/family:  General risk, such as traffic hazards, adverse weather conditions, rough terrain or turbulence, possible failure of equipment (including vehicle or aircraft), or consequences of actions of persons outside the control of the transport personnel, Unanticipated needs of medical equipment and personnel during transport, Risk of worsening condition, The possibility of a transport vehicle being unavailable      Based on these reasonable risks and benefits to the patient and/or the unborn child(meg), and based upon the information available at the time of the patients examination, I certify that the medical benefits reasonably to be expected from the provision of appropriate medical treatments at another medical facility outweigh the increasing risks, if any, to the individuals medical condition, and in the case of labor to the unborn child, from effecting the transfer      X____________________________________________ DATE 09/05/20        TIME_______      ORIGINAL - SEND TO MEDICAL RECORDS   COPY - SEND WITH PATIENT DURING TRANSFER
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
## 2020-09-05 NOTE — TRAUMA DOCUMENTATION
Pt states that oral medications will not work for him and that he needs IV medications  Pt told to discuss it with trauma when they come to see him

## 2020-09-05 NOTE — EMTALA/ACUTE CARE TRANSFER
[facility]  [address]  Dept: [phone]      EMTALA TRANSFER CONSENT    NAME Gianna Montes                                         1990                              MRN [id number]    I have been informed of my rights regarding examination, treatment, and transfer   by Dr Danielle Lee, *    Benefits: Specialized equipment and/or services available at the receiving facility (Include comment)________________________, Continuity of care(trauma)    Risks: Potential for delay in receiving treatment, Potential deterioration of medical condition, Loss of IV, Increased discomfort during transfer, Possible worsening of condition or death during transfer      Consent for Transfer:  I acknowledge that my medical condition has been evaluated and explained to me by the emergency department physician or other qualified medical person and/or my attending physician, who has recommended that I be transferred to the service of  Accepting Physician: Dr Amanuel Tloentino at [address] [place] [address] Name, [address] : [facility]  The above potential benefits of such transfer, the potential risks associated with such transfer, and the probable risks of not being transferred have been explained to me, and I fully understand them  The doctor has explained that, in my case, the benefits of transfer outweigh the risks  I agree to be transferred  I authorize the performance of emergency medical procedures and treatments upon me in both transit and upon arrival at the receiving facility  Additionally, I authorize the release of any and all medical records to the receiving facility and request they be transported with me, if possible  I understand that the safest mode of transportation during a medical emergency is an ambulance and that the Hospital advocates the use of this mode of transport   Risks of traveling to the receiving facility by car, including absence of medical control, life sustaining equipment, such as oxygen, and medical personnel has been explained to me and I fully understand them  (LUKASZ CORRECT BOX BELOW)  [  ]  I consent to the stated transfer and to be transported by ambulance/helicopter  [  ]  I consent to the stated transfer, but refuse transportation by ambulance and accept full responsibility for my transportation by car  I understand the risks of non-ambulance transfers and I exonerate the Hospital and its staff from any deterioration in my condition that results from this refusal     X___________________________________________    DATE  20  TIME________  Signature of patient or legally responsible individual signing on patient behalf           RELATIONSHIP TO PATIENT_________________________          Provider Certification    NAME Gianna Montes                                        [facility] 1990                              MRN [id number]    A medical screening exam was performed on the above named patient  Based on the examination:    Condition Necessitating Transfer The primary encounter diagnosis was Hyponatremia  A diagnosis of SIADH (syndrome of inappropriate ADH production) ([facility] [address] ) was also pertinent to this visit      Patient Condition: The patient has been stabilized such that within reasonable medical probability, no material deterioration of the patient condition or the condition of the unborn child(meg) is likely to result from the transfer    Reason for Transfer: Level of Care needed not available at this facility    Transfer Requirements: [facility]   · Space available and qualified personnel available for treatment as acknowledged by    · Agreed to accept transfer and to provide appropriate medical treatment as acknowledged by       Dr Amanuel Tolentino  · Appropriate medical records of the examination and treatment of the patient are provided at the time of transfer   [address] [facility] COMPLETED _______  · Transfer will be performed by qualified personnel from    and appropriate transfer equipment as required, including the use of necessary and appropriate life support measures  Provider Certification: I have examined the patient and explained the following risks and benefits of being transferred/refusing transfer to the patient/family:  General risk, such as traffic hazards, adverse weather conditions, rough terrain or turbulence, possible failure of equipment (including vehicle or aircraft), or consequences of actions of persons outside the control of the transport personnel, Unanticipated needs of medical equipment and personnel during transport, Risk of worsening condition, The possibility of a transport vehicle being unavailable      Based on these reasonable risks and benefits to the patient and/or the unborn child(meg), and based upon the information available at the time of the patients examination, I certify that the medical benefits reasonably to be expected from the provision of appropriate medical treatments at another medical facility outweigh the increasing risks, if any, to the individuals medical condition, and in the case of labor to the unborn child, from effecting the transfer      X____________________________________________ DATE 09/05/20        TIME_______      ORIGINAL - SEND TO MEDICAL RECORDS   COPY - SEND WITH PATIENT DURING TRANSFER

## 2020-09-06 PROBLEM — S69.90XA WRIST INJURY: Status: ACTIVE | Noted: 2020-09-06

## 2020-09-06 PROBLEM — S32.009A FRACTURE OF LUMBAR SPINE (HCC): Status: ACTIVE | Noted: 2020-09-06

## 2020-09-06 PROBLEM — S12.041A CLOSED NONDISPLACED LATERAL MASS FRACTURE OF FIRST CERVICAL VERTEBRA (HCC): Status: ACTIVE | Noted: 2020-09-06

## 2020-09-06 PROBLEM — Z86.79 HISTORY OF SUBARACHNOID HEMORRHAGE: Status: ACTIVE | Noted: 2020-09-06

## 2020-09-06 LAB
ANION GAP SERPL CALCULATED.3IONS-SCNC: 10 MMOL/L (ref 4–13)
ANION GAP SERPL CALCULATED.3IONS-SCNC: 8 MMOL/L (ref 4–13)
ANION GAP SERPL CALCULATED.3IONS-SCNC: 9 MMOL/L (ref 4–13)
ATRIAL RATE: 60 BPM
BUN SERPL-MCNC: 13 MG/DL (ref 5–25)
BUN SERPL-MCNC: 15 MG/DL (ref 5–25)
BUN SERPL-MCNC: 15 MG/DL (ref 5–25)
CALCIUM SERPL-MCNC: 8.9 MG/DL (ref 8.3–10.1)
CALCIUM SERPL-MCNC: 9 MG/DL (ref 8.3–10.1)
CALCIUM SERPL-MCNC: 9.4 MG/DL (ref 8.3–10.1)
CHLORIDE SERPL-SCNC: 84 MMOL/L (ref 100–108)
CHLORIDE SERPL-SCNC: 87 MMOL/L (ref 100–108)
CHLORIDE SERPL-SCNC: 87 MMOL/L (ref 100–108)
CO2 SERPL-SCNC: 22 MMOL/L (ref 21–32)
CO2 SERPL-SCNC: 24 MMOL/L (ref 21–32)
CO2 SERPL-SCNC: 24 MMOL/L (ref 21–32)
CREAT SERPL-MCNC: 0.88 MG/DL (ref 0.6–1.3)
CREAT SERPL-MCNC: 0.89 MG/DL (ref 0.6–1.3)
CREAT SERPL-MCNC: 0.97 MG/DL (ref 0.6–1.3)
ERYTHROCYTE [DISTWIDTH] IN BLOOD BY AUTOMATED COUNT: 11.8 % (ref 11.6–15.1)
GFR SERPL CREATININE-BSD FRML MDRD: 104 ML/MIN/1.73SQ M
GFR SERPL CREATININE-BSD FRML MDRD: 114 ML/MIN/1.73SQ M
GFR SERPL CREATININE-BSD FRML MDRD: 114 ML/MIN/1.73SQ M
GLUCOSE SERPL-MCNC: 104 MG/DL (ref 65–140)
GLUCOSE SERPL-MCNC: 116 MG/DL (ref 65–140)
GLUCOSE SERPL-MCNC: 119 MG/DL (ref 65–140)
HCT VFR BLD AUTO: 39.5 % (ref 36.5–49.3)
HGB BLD-MCNC: 14.7 G/DL (ref 12–17)
MCH RBC QN AUTO: 31.3 PG (ref 26.8–34.3)
MCHC RBC AUTO-ENTMCNC: 37.2 G/DL (ref 31.4–37.4)
MCV RBC AUTO: 84 FL (ref 82–98)
NRBC BLD AUTO-RTO: 0 /100 WBCS
P AXIS: 33 DEGREES
PLATELET # BLD AUTO: 292 THOUSANDS/UL (ref 149–390)
PMV BLD AUTO: 8.9 FL (ref 8.9–12.7)
POTASSIUM SERPL-SCNC: 4 MMOL/L (ref 3.5–5.3)
POTASSIUM SERPL-SCNC: 4.5 MMOL/L (ref 3.5–5.3)
POTASSIUM SERPL-SCNC: 4.6 MMOL/L (ref 3.5–5.3)
PR INTERVAL: 166 MS
QRS AXIS: 40 DEGREES
QRSD INTERVAL: 84 MS
QT INTERVAL: 388 MS
QTC INTERVAL: 388 MS
RBC # BLD AUTO: 4.7 MILLION/UL (ref 3.88–5.62)
SODIUM SERPL-SCNC: 118 MMOL/L (ref 136–145)
SODIUM SERPL-SCNC: 118 MMOL/L (ref 136–145)
SODIUM SERPL-SCNC: 119 MMOL/L (ref 136–145)
T WAVE AXIS: 24 DEGREES
TROPONIN I SERPL-MCNC: <0.02 NG/ML
VENTRICULAR RATE: 60 BPM
WBC # BLD AUTO: 5.5 THOUSAND/UL (ref 4.31–10.16)

## 2020-09-06 PROCEDURE — 80048 BASIC METABOLIC PNL TOTAL CA: CPT | Performed by: EMERGENCY MEDICINE

## 2020-09-06 PROCEDURE — 93010 ELECTROCARDIOGRAM REPORT: CPT | Performed by: INTERNAL MEDICINE

## 2020-09-06 PROCEDURE — 80048 BASIC METABOLIC PNL TOTAL CA: CPT | Performed by: INTERNAL MEDICINE

## 2020-09-06 PROCEDURE — 85027 COMPLETE CBC AUTOMATED: CPT | Performed by: PHYSICIAN ASSISTANT

## 2020-09-06 PROCEDURE — 93005 ELECTROCARDIOGRAM TRACING: CPT

## 2020-09-06 PROCEDURE — 99232 SBSQ HOSP IP/OBS MODERATE 35: CPT | Performed by: PHYSICIAN ASSISTANT

## 2020-09-06 PROCEDURE — 84484 ASSAY OF TROPONIN QUANT: CPT | Performed by: PHYSICIAN ASSISTANT

## 2020-09-06 PROCEDURE — 99223 1ST HOSP IP/OBS HIGH 75: CPT | Performed by: INTERNAL MEDICINE

## 2020-09-06 RX ORDER — MAGNESIUM HYDROXIDE/ALUMINUM HYDROXICE/SIMETHICONE 120; 1200; 1200 MG/30ML; MG/30ML; MG/30ML
30 SUSPENSION ORAL EVERY 4 HOURS PRN
Status: DISCONTINUED | OUTPATIENT
Start: 2020-09-06 | End: 2020-09-12 | Stop reason: HOSPADM

## 2020-09-06 RX ORDER — FUROSEMIDE 20 MG/1
10 TABLET ORAL ONCE
Status: COMPLETED | OUTPATIENT
Start: 2020-09-06 | End: 2020-09-06

## 2020-09-06 RX ORDER — PANTOPRAZOLE SODIUM 40 MG/1
40 TABLET, DELAYED RELEASE ORAL
Status: DISCONTINUED | OUTPATIENT
Start: 2020-09-07 | End: 2020-09-12 | Stop reason: HOSPADM

## 2020-09-06 RX ORDER — SODIUM CHLORIDE 1000 MG
1 TABLET, SOLUBLE MISCELLANEOUS
Status: DISCONTINUED | OUTPATIENT
Start: 2020-09-06 | End: 2020-09-06

## 2020-09-06 RX ORDER — CALCIUM CARBONATE 200(500)MG
500 TABLET,CHEWABLE ORAL 3 TIMES DAILY PRN
Status: DISCONTINUED | OUTPATIENT
Start: 2020-09-06 | End: 2020-09-12 | Stop reason: HOSPADM

## 2020-09-06 RX ORDER — SODIUM CHLORIDE 1000 MG
2 TABLET, SOLUBLE MISCELLANEOUS
Status: DISCONTINUED | OUTPATIENT
Start: 2020-09-06 | End: 2020-09-06

## 2020-09-06 RX ORDER — CALCIUM CARBONATE 200(500)MG
500 TABLET,CHEWABLE ORAL DAILY PRN
Status: DISCONTINUED | OUTPATIENT
Start: 2020-09-06 | End: 2020-09-06

## 2020-09-06 RX ADMIN — DILTIAZEM HYDROCHLORIDE 240 MG: 240 CAPSULE, COATED, EXTENDED RELEASE ORAL at 08:09

## 2020-09-06 RX ADMIN — POLYETHYLENE GLYCOL 3350 17 G: 17 POWDER, FOR SOLUTION ORAL at 17:24

## 2020-09-06 RX ADMIN — LEVETIRACETAM 500 MG: 500 TABLET, FILM COATED ORAL at 08:09

## 2020-09-06 RX ADMIN — OXYCODONE HYDROCHLORIDE 10 MG: 10 TABLET ORAL at 20:14

## 2020-09-06 RX ADMIN — CALCIUM CARBONATE (ANTACID) CHEW TAB 500 MG 500 MG: 500 CHEW TAB at 10:22

## 2020-09-06 RX ADMIN — SODIUM CHLORIDE TAB 1 GM 2 G: 1 TAB at 10:22

## 2020-09-06 RX ADMIN — HYDROMORPHONE HYDROCHLORIDE 0.5 MG: 1 INJECTION, SOLUTION INTRAMUSCULAR; INTRAVENOUS; SUBCUTANEOUS at 21:59

## 2020-09-06 RX ADMIN — ENOXAPARIN SODIUM 30 MG: 30 INJECTION SUBCUTANEOUS at 10:22

## 2020-09-06 RX ADMIN — OXYCODONE HYDROCHLORIDE 10 MG: 10 TABLET ORAL at 05:23

## 2020-09-06 RX ADMIN — SENNOSIDES 17.2 MG: 8.6 TABLET, FILM COATED ORAL at 08:09

## 2020-09-06 RX ADMIN — DOCUSATE SODIUM 100 MG: 100 CAPSULE, LIQUID FILLED ORAL at 08:09

## 2020-09-06 RX ADMIN — OXYCODONE HYDROCHLORIDE 10 MG: 10 TABLET ORAL at 23:52

## 2020-09-06 RX ADMIN — DOCUSATE SODIUM 100 MG: 100 CAPSULE, LIQUID FILLED ORAL at 17:24

## 2020-09-06 RX ADMIN — CARBAMIDE PEROXIDE 6.5% 5 DROP: 6.5 LIQUID AURICULAR (OTIC) at 17:24

## 2020-09-06 RX ADMIN — MELATONIN 6 MG: at 21:59

## 2020-09-06 RX ADMIN — ACETAMINOPHEN 975 MG: 325 TABLET, FILM COATED ORAL at 05:23

## 2020-09-06 RX ADMIN — SODIUM CHLORIDE 30 ML/HR: 234 INJECTION INTRAMUSCULAR; INTRAVENOUS; SUBCUTANEOUS at 16:22

## 2020-09-06 RX ADMIN — ENOXAPARIN SODIUM 30 MG: 30 INJECTION SUBCUTANEOUS at 21:59

## 2020-09-06 RX ADMIN — LEVETIRACETAM 500 MG: 500 TABLET, FILM COATED ORAL at 20:13

## 2020-09-06 RX ADMIN — FUROSEMIDE 10 MG: 20 TABLET ORAL at 11:23

## 2020-09-06 RX ADMIN — CALCIUM CARBONATE (ANTACID) CHEW TAB 500 MG 500 MG: 500 CHEW TAB at 16:22

## 2020-09-06 RX ADMIN — ALUMINUM HYDROXIDE, MAGNESIUM HYDROXIDE, AND SIMETHICONE 30 ML: 200; 200; 20 SUSPENSION ORAL at 17:24

## 2020-09-06 RX ADMIN — ACETAMINOPHEN 975 MG: 325 TABLET, FILM COATED ORAL at 11:23

## 2020-09-06 RX ADMIN — METHOCARBAMOL TABLETS 750 MG: 750 TABLET, COATED ORAL at 05:23

## 2020-09-06 NOTE — QUICK NOTE
Called to bedside to assess patient  Patient was noted have some reflux; that is new  Tums were ordered; however secondary to some chest pain will order EKG in follow-up CBC in troponin with repeat BMP at noon  Blood pressure is stable  Vitals are otherwise stable  Will continue close observation at this time  Family updated at bedside
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
## 2020-09-06 NOTE — ASSESSMENT & PLAN NOTE
- hyponatremia in the setting of possible trauma  - nephrology consulted, appreciate input  - fluid restriction of 1500 mL  - sodium tabs 2 mg t i d   - consider the administration Lasix per Nephrology  - will follow up for formal recommendation  - patient currently GCS 15 and answering all questions appropriate

## 2020-09-06 NOTE — ASSESSMENT & PLAN NOTE
- currently in a cervical collar  - no neuro surgical consultation indicated  - will need outpatient follow-up  - CTA completed in stable  - neurovascularly intact

## 2020-09-06 NOTE — PLAN OF CARE
Problem: Potential for Falls  Goal: Patient will remain free of falls  Description: INTERVENTIONS:  - Assess patient frequently for physical needs  -  Identify cognitive and physical deficits and behaviors that affect risk of falls    -  [place] fall precautions as indicated by assessment   - Educate patient/family on patient safety including physical limitations  - Instruct patient to call for assistance with activity based on assessment  - Modify environment to reduce risk of injury  - Consider OT/PT consult to assist with strengthening/mobility  Outcome: Progressing     Problem: METABOLIC, FLUID AND ELECTROLYTES - ADULT  Goal: Electrolytes maintained within normal limits  Description: INTERVENTIONS:  - Monitor labs and assess patient for signs and symptoms of electrolyte imbalances  - Administer electrolyte replacement as ordered  - Monitor response to electrolyte replacements, including repeat lab results as appropriate  - Instruct patient on fluid and nutrition as appropriate  Outcome: Progressing  Goal: Fluid balance maintained  Description: INTERVENTIONS:  - Monitor labs   - Monitor I/O and WT  - Instruct patient on fluid and nutrition as appropriate  - Assess for signs & symptoms of volume excess or deficit  Outcome: Progressing     Problem: SKIN/TISSUE INTEGRITY - ADULT  Goal: Incision(s), wounds(s) or drain site(s) healing without S/S of infection  Description: INTERVENTIONS  - Assess and document risk factors for skin impairment   - Assess and document dressing, incision, wound bed, drain sites and surrounding tissue  - Consider nutrition services referral as needed  - Provide patient/ family education  Outcome: Progressing     Problem: MUSCULOSKELETAL - ADULT  Goal: Maintain or return mobility to safest level of function  Description: INTERVENTIONS:  - Assess patient's ability to carry out ADLs; assess patient's baseline for ADL function and identify physical deficits which impact ability to perform ADLs (bathing, care of mouth/teeth, toileting, grooming, dressing, etc )  - Assess/evaluate cause of self-care deficits   - Assess range of motion  - Assess patient's mobility  - Assess patient's need for assistive devices and provide as appropriate  - Encourage maximum independence but intervene and supervise when necessary  - Involve family in performance of ADLs  - Assess for home care needs following discharge   - Consider OT consult to assist with ADL evaluation and planning for discharge  - Provide patient education as appropriate  Outcome: Progressing  Goal: Maintain proper alignment of affected body part  Description: INTERVENTIONS:  - Support, maintain and protect limb and body alignment  - Provide patient/ family with appropriate education  Outcome: Progressing     Problem: PAIN - ADULT  Goal: Verbalizes/displays adequate comfort level or baseline comfort level  Description: Interventions:  - Encourage patient to monitor pain and request assistance  - Assess pain using appropriate pain scale  - Administer analgesics based on type and severity of pain and evaluate response  - Implement non-pharmacological measures as appropriate and evaluate response  - Consider cultural and social influences on pain and pain management  - Notify physician/advanced practitioner if interventions unsuccessful or patient reports new pain  Outcome: Progressing     Problem: SAFETY ADULT  Goal: Patient will remain free of falls  Description: INTERVENTIONS:  - Assess patient frequently for physical needs  -  Identify cognitive and physical deficits and behaviors that affect risk of falls    -  [place] fall precautions as indicated by assessment   - Educate patient/family on patient safety including physical limitations  - Instruct patient to call for assistance with activity based on assessment  - Modify environment to reduce risk of injury  - Consider OT/PT consult to assist with strengthening/mobility  Outcome: Progressing  Goal: Maintain or return to baseline ADL function  Description: INTERVENTIONS:  -  Assess patient's ability to carry out ADLs; assess patient's baseline for ADL function and identify physical deficits which impact ability to perform ADLs (bathing, care of mouth/teeth, toileting, grooming, dressing, etc )  - Assess/evaluate cause of self-care deficits   - Assess range of motion  - Assess patient's mobility; develop plan if impaired  - Assess patient's need for assistive devices and provide as appropriate  - Encourage maximum independence but intervene and supervise when necessary  - Involve family in performance of ADLs  - Assess for home care needs following discharge   - Consider OT consult to assist with ADL evaluation and planning for discharge  - Provide patient education as appropriate  Outcome: Progressing  Goal: Maintain or return mobility status to optimal level  Description: INTERVENTIONS:  - Assess patient's baseline mobility status (ambulation, transfers, stairs, etc )    - Identify cognitive and physical deficits and behaviors that affect mobility  - Identify mobility aids required to assist with transfers and/or ambulation (gait belt, sit-to-stand, lift, walker, cane, etc )  - [place] fall precautions as indicated by assessment  - Record patient progress and toleration of activity level on Mobility SBAR; progress patient to next Phase/Stage  - Instruct patient to call for assistance with activity based on assessment  - Consider rehabilitation consult to assist with strengthening/weightbearing, etc   Outcome: Progressing     Problem: DISCHARGE PLANNING  Goal: Discharge to home or other facility with appropriate resources  Description: INTERVENTIONS:  - Identify barriers to discharge w/patient and caregiver  - Arrange for needed discharge resources and transportation as appropriate  - Identify discharge learning needs (meds, wound care, etc )  - Arrange for interpretive services to assist at discharge as needed  - Refer to Case Management Department for coordinating discharge planning if the patient needs post-hospital services based on physician/advanced practitioner order or complex needs related to functional status, cognitive ability, or social support system  Outcome: Progressing     Problem: Knowledge Deficit  Goal: Patient/family/caregiver demonstrates understanding of disease process, treatment plan, medications, and discharge instructions  Description: Complete learning assessment and assess knowledge base    Interventions:  - Provide teaching at level of understanding  - Provide teaching via preferred learning methods  Outcome: Progressing

## 2020-09-06 NOTE — ASSESSMENT & PLAN NOTE
- resumed home medications  - confirmed with wife at bedside  - no further workup  - outpatient follow-up with PCP

## 2020-09-06 NOTE — ASSESSMENT & PLAN NOTE
- patient has history of bilateral wrist injuries  - currently splinted  - will require outpatient follow-up with orthopedics  - no other workup at this time  - neurovascularly intact
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
## 2020-09-06 NOTE — TREATMENT PLAN
Renal addendum    Repeat labs reviewed which shows to worsening sodium sodium 118  Will discontinue salt tablet, start patient on 1 8% hypertonic saline at 30 mL/hour  Continue BMP Q six hourly with next BMP at 6:00 p m  Raj Dorsey Decrease fluid restriction to 1 2 L per day

## 2020-09-07 LAB
ANION GAP SERPL CALCULATED.3IONS-SCNC: 10 MMOL/L (ref 4–13)
ANION GAP SERPL CALCULATED.3IONS-SCNC: 7 MMOL/L (ref 4–13)
ANION GAP SERPL CALCULATED.3IONS-SCNC: 8 MMOL/L (ref 4–13)
ANION GAP SERPL CALCULATED.3IONS-SCNC: 8 MMOL/L (ref 4–13)
BUN SERPL-MCNC: 15 MG/DL (ref 5–25)
BUN SERPL-MCNC: 15 MG/DL (ref 5–25)
BUN SERPL-MCNC: 16 MG/DL (ref 5–25)
BUN SERPL-MCNC: 17 MG/DL (ref 5–25)
CALCIUM SERPL-MCNC: 8.7 MG/DL (ref 8.3–10.1)
CALCIUM SERPL-MCNC: 8.8 MG/DL (ref 8.3–10.1)
CALCIUM SERPL-MCNC: 9 MG/DL (ref 8.3–10.1)
CALCIUM SERPL-MCNC: 9.2 MG/DL (ref 8.3–10.1)
CHLORIDE SERPL-SCNC: 86 MMOL/L (ref 100–108)
CHLORIDE SERPL-SCNC: 87 MMOL/L (ref 100–108)
CHLORIDE SERPL-SCNC: 88 MMOL/L (ref 100–108)
CHLORIDE SERPL-SCNC: 88 MMOL/L (ref 100–108)
CO2 SERPL-SCNC: 23 MMOL/L (ref 21–32)
CO2 SERPL-SCNC: 24 MMOL/L (ref 21–32)
CO2 SERPL-SCNC: 25 MMOL/L (ref 21–32)
CO2 SERPL-SCNC: 25 MMOL/L (ref 21–32)
CREAT SERPL-MCNC: 0.91 MG/DL (ref 0.6–1.3)
CREAT SERPL-MCNC: 0.93 MG/DL (ref 0.6–1.3)
CREAT SERPL-MCNC: 0.96 MG/DL (ref 0.6–1.3)
CREAT SERPL-MCNC: 1.01 MG/DL (ref 0.6–1.3)
ERYTHROCYTE [DISTWIDTH] IN BLOOD BY AUTOMATED COUNT: 11.9 % (ref 11.6–15.1)
GFR SERPL CREATININE-BSD FRML MDRD: 105 ML/MIN/1.73SQ M
GFR SERPL CREATININE-BSD FRML MDRD: 109 ML/MIN/1.73SQ M
GFR SERPL CREATININE-BSD FRML MDRD: 112 ML/MIN/1.73SQ M
GFR SERPL CREATININE-BSD FRML MDRD: 99 ML/MIN/1.73SQ M
GLUCOSE SERPL-MCNC: 105 MG/DL (ref 65–140)
GLUCOSE SERPL-MCNC: 107 MG/DL (ref 65–140)
GLUCOSE SERPL-MCNC: 114 MG/DL (ref 65–140)
GLUCOSE SERPL-MCNC: 119 MG/DL (ref 65–140)
GLUCOSE SERPL-MCNC: 124 MG/DL (ref 65–140)
HCT VFR BLD AUTO: 39.4 % (ref 36.5–49.3)
HGB BLD-MCNC: 14.7 G/DL (ref 12–17)
MCH RBC QN AUTO: 31.3 PG (ref 26.8–34.3)
MCHC RBC AUTO-ENTMCNC: 37.3 G/DL (ref 31.4–37.4)
MCV RBC AUTO: 84 FL (ref 82–98)
NRBC BLD AUTO-RTO: 0 /100 WBCS
PLATELET # BLD AUTO: 288 THOUSANDS/UL (ref 149–390)
PMV BLD AUTO: 8.9 FL (ref 8.9–12.7)
POTASSIUM SERPL-SCNC: 4.1 MMOL/L (ref 3.5–5.3)
POTASSIUM SERPL-SCNC: 4.2 MMOL/L (ref 3.5–5.3)
POTASSIUM SERPL-SCNC: 4.6 MMOL/L (ref 3.5–5.3)
POTASSIUM SERPL-SCNC: 4.7 MMOL/L (ref 3.5–5.3)
RBC # BLD AUTO: 4.69 MILLION/UL (ref 3.88–5.62)
SODIUM SERPL-SCNC: 119 MMOL/L (ref 136–145)
SODIUM SERPL-SCNC: 119 MMOL/L (ref 136–145)
SODIUM SERPL-SCNC: 120 MMOL/L (ref 136–145)
SODIUM SERPL-SCNC: 121 MMOL/L (ref 136–145)
WBC # BLD AUTO: 5.92 THOUSAND/UL (ref 4.31–10.16)

## 2020-09-07 PROCEDURE — 99232 SBSQ HOSP IP/OBS MODERATE 35: CPT | Performed by: INTERNAL MEDICINE

## 2020-09-07 PROCEDURE — 82948 REAGENT STRIP/BLOOD GLUCOSE: CPT

## 2020-09-07 PROCEDURE — 99231 SBSQ HOSP IP/OBS SF/LOW 25: CPT | Performed by: SURGERY

## 2020-09-07 PROCEDURE — 85027 COMPLETE CBC AUTOMATED: CPT | Performed by: PHYSICIAN ASSISTANT

## 2020-09-07 PROCEDURE — 80048 BASIC METABOLIC PNL TOTAL CA: CPT | Performed by: INTERNAL MEDICINE

## 2020-09-07 RX ORDER — DEXAMETHASONE SODIUM PHOSPHATE 4 MG/ML
10 INJECTION, SOLUTION INTRA-ARTICULAR; INTRALESIONAL; INTRAMUSCULAR; INTRAVENOUS; SOFT TISSUE EVERY 8 HOURS SCHEDULED
Status: DISCONTINUED | OUTPATIENT
Start: 2020-09-07 | End: 2020-09-11

## 2020-09-07 RX ADMIN — DOCUSATE SODIUM 100 MG: 100 CAPSULE, LIQUID FILLED ORAL at 17:20

## 2020-09-07 RX ADMIN — OXYCODONE HYDROCHLORIDE 10 MG: 10 TABLET ORAL at 05:47

## 2020-09-07 RX ADMIN — ACETAMINOPHEN 975 MG: 325 TABLET, FILM COATED ORAL at 17:20

## 2020-09-07 RX ADMIN — SODIUM CHLORIDE: 234 INJECTION INTRAMUSCULAR; INTRAVENOUS; SUBCUTANEOUS at 21:24

## 2020-09-07 RX ADMIN — ENOXAPARIN SODIUM 30 MG: 30 INJECTION SUBCUTANEOUS at 22:11

## 2020-09-07 RX ADMIN — HYDROMORPHONE HYDROCHLORIDE 0.5 MG: 1 INJECTION, SOLUTION INTRAMUSCULAR; INTRAVENOUS; SUBCUTANEOUS at 16:21

## 2020-09-07 RX ADMIN — HYDROMORPHONE HYDROCHLORIDE 0.5 MG: 1 INJECTION, SOLUTION INTRAMUSCULAR; INTRAVENOUS; SUBCUTANEOUS at 01:15

## 2020-09-07 RX ADMIN — DEXAMETHASONE SODIUM PHOSPHATE 10 MG: 4 INJECTION, SOLUTION INTRAMUSCULAR; INTRAVENOUS at 22:10

## 2020-09-07 RX ADMIN — HYDROMORPHONE HYDROCHLORIDE 0.5 MG: 1 INJECTION, SOLUTION INTRAMUSCULAR; INTRAVENOUS; SUBCUTANEOUS at 08:13

## 2020-09-07 RX ADMIN — CARBAMIDE PEROXIDE 6.5% 5 DROP: 6.5 LIQUID AURICULAR (OTIC) at 08:41

## 2020-09-07 RX ADMIN — OXYCODONE HYDROCHLORIDE 10 MG: 10 TABLET ORAL at 11:56

## 2020-09-07 RX ADMIN — LEVETIRACETAM 500 MG: 500 TABLET, FILM COATED ORAL at 20:26

## 2020-09-07 RX ADMIN — DEXAMETHASONE SODIUM PHOSPHATE 10 MG: 4 INJECTION, SOLUTION INTRAMUSCULAR; INTRAVENOUS at 17:21

## 2020-09-07 RX ADMIN — ENOXAPARIN SODIUM 30 MG: 30 INJECTION SUBCUTANEOUS at 08:36

## 2020-09-07 RX ADMIN — CARBAMIDE PEROXIDE 6.5% 5 DROP: 6.5 LIQUID AURICULAR (OTIC) at 17:21

## 2020-09-07 RX ADMIN — PANTOPRAZOLE SODIUM 40 MG: 40 TABLET, DELAYED RELEASE ORAL at 05:47

## 2020-09-07 RX ADMIN — DILTIAZEM HYDROCHLORIDE 240 MG: 240 CAPSULE, COATED, EXTENDED RELEASE ORAL at 08:35

## 2020-09-07 RX ADMIN — ACETAMINOPHEN 975 MG: 325 TABLET, FILM COATED ORAL at 08:37

## 2020-09-07 RX ADMIN — SENNOSIDES 17.2 MG: 8.6 TABLET, FILM COATED ORAL at 08:35

## 2020-09-07 RX ADMIN — OXYCODONE HYDROCHLORIDE 10 MG: 10 TABLET ORAL at 17:21

## 2020-09-07 RX ADMIN — OXYCODONE HYDROCHLORIDE 10 MG: 10 TABLET ORAL at 22:16

## 2020-09-07 RX ADMIN — HYDROMORPHONE HYDROCHLORIDE 0.5 MG: 1 INJECTION, SOLUTION INTRAMUSCULAR; INTRAVENOUS; SUBCUTANEOUS at 23:17

## 2020-09-07 RX ADMIN — LEVETIRACETAM 500 MG: 500 TABLET, FILM COATED ORAL at 08:35

## 2020-09-07 RX ADMIN — DOCUSATE SODIUM 100 MG: 100 CAPSULE, LIQUID FILLED ORAL at 08:35

## 2020-09-07 RX ADMIN — MELATONIN 6 MG: at 22:11
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
## 2020-09-07 NOTE — TREATMENT PLAN
Renal on call  Sodium slightly improving to 119 meq/L at 1 am labs  Only 1 meq/L improvement since starting 1 8 % saline since 3 pm yesterday  Increased rate to 40 ml/hr   Continue to monitor sodium level
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
## 2020-09-08 ENCOUNTER — APPOINTMENT (INPATIENT)
Dept: RADIOLOGY | Facility: HOSPITAL | Age: 30
DRG: 644 | End: 2020-09-08
Payer: COMMERCIAL

## 2020-09-08 ENCOUNTER — TELEPHONE (OUTPATIENT)
Dept: OBGYN CLINIC | Facility: HOSPITAL | Age: 30
End: 2020-09-08

## 2020-09-08 PROBLEM — G51.0 FACIAL PALSY: Status: ACTIVE | Noted: 2020-09-08

## 2020-09-08 PROBLEM — K59.00 CONSTIPATION: Status: ACTIVE | Noted: 2020-09-08

## 2020-09-08 PROBLEM — K59.03 DRUG-INDUCED CONSTIPATION: Status: ACTIVE | Noted: 2020-09-08

## 2020-09-08 LAB
ANION GAP SERPL CALCULATED.3IONS-SCNC: 11 MMOL/L (ref 4–13)
ANION GAP SERPL CALCULATED.3IONS-SCNC: 11 MMOL/L (ref 4–13)
ANION GAP SERPL CALCULATED.3IONS-SCNC: 7 MMOL/L (ref 4–13)
ANION GAP SERPL CALCULATED.3IONS-SCNC: 8 MMOL/L (ref 4–13)
BUN SERPL-MCNC: 18 MG/DL (ref 5–25)
BUN SERPL-MCNC: 21 MG/DL (ref 5–25)
BUN SERPL-MCNC: 21 MG/DL (ref 5–25)
BUN SERPL-MCNC: 23 MG/DL (ref 5–25)
CALCIUM SERPL-MCNC: 9.2 MG/DL (ref 8.3–10.1)
CALCIUM SERPL-MCNC: 9.3 MG/DL (ref 8.3–10.1)
CALCIUM SERPL-MCNC: 9.6 MG/DL (ref 8.3–10.1)
CALCIUM SERPL-MCNC: 9.9 MG/DL (ref 8.3–10.1)
CHLORIDE SERPL-SCNC: 86 MMOL/L (ref 100–108)
CHLORIDE SERPL-SCNC: 86 MMOL/L (ref 100–108)
CHLORIDE SERPL-SCNC: 87 MMOL/L (ref 100–108)
CHLORIDE SERPL-SCNC: 88 MMOL/L (ref 100–108)
CO2 SERPL-SCNC: 21 MMOL/L (ref 21–32)
CO2 SERPL-SCNC: 22 MMOL/L (ref 21–32)
CO2 SERPL-SCNC: 25 MMOL/L (ref 21–32)
CO2 SERPL-SCNC: 26 MMOL/L (ref 21–32)
CREAT SERPL-MCNC: 0.95 MG/DL (ref 0.6–1.3)
CREAT SERPL-MCNC: 1.04 MG/DL (ref 0.6–1.3)
CREAT SERPL-MCNC: 1.12 MG/DL (ref 0.6–1.3)
CREAT SERPL-MCNC: 1.13 MG/DL (ref 0.6–1.3)
GFR SERPL CREATININE-BSD FRML MDRD: 106 ML/MIN/1.73SQ M
GFR SERPL CREATININE-BSD FRML MDRD: 86 ML/MIN/1.73SQ M
GFR SERPL CREATININE-BSD FRML MDRD: 87 ML/MIN/1.73SQ M
GFR SERPL CREATININE-BSD FRML MDRD: 95 ML/MIN/1.73SQ M
GLUCOSE SERPL-MCNC: 125 MG/DL (ref 65–140)
GLUCOSE SERPL-MCNC: 134 MG/DL (ref 65–140)
GLUCOSE SERPL-MCNC: 147 MG/DL (ref 65–140)
GLUCOSE SERPL-MCNC: 153 MG/DL (ref 65–140)
POTASSIUM SERPL-SCNC: 4.9 MMOL/L (ref 3.5–5.3)
POTASSIUM SERPL-SCNC: 5.5 MMOL/L (ref 3.5–5.3)
SODIUM SERPL-SCNC: 118 MMOL/L (ref 136–145)
SODIUM SERPL-SCNC: 119 MMOL/L (ref 136–145)
SODIUM SERPL-SCNC: 120 MMOL/L (ref 136–145)
SODIUM SERPL-SCNC: 121 MMOL/L (ref 136–145)

## 2020-09-08 PROCEDURE — 99222 1ST HOSP IP/OBS MODERATE 55: CPT | Performed by: OTOLARYNGOLOGY

## 2020-09-08 PROCEDURE — 80048 BASIC METABOLIC PNL TOTAL CA: CPT | Performed by: INTERNAL MEDICINE

## 2020-09-08 PROCEDURE — 99232 SBSQ HOSP IP/OBS MODERATE 35: CPT | Performed by: SURGERY

## 2020-09-08 PROCEDURE — 99232 SBSQ HOSP IP/OBS MODERATE 35: CPT | Performed by: INTERNAL MEDICINE

## 2020-09-08 PROCEDURE — 73110 X-RAY EXAM OF WRIST: CPT

## 2020-09-08 RX ORDER — POLYETHYLENE GLYCOL 3350 17 G/17G
17 POWDER, FOR SOLUTION ORAL DAILY
Status: DISCONTINUED | OUTPATIENT
Start: 2020-09-08 | End: 2020-09-12 | Stop reason: HOSPADM

## 2020-09-08 RX ORDER — AMOXICILLIN 250 MG
2 CAPSULE ORAL
Status: DISCONTINUED | OUTPATIENT
Start: 2020-09-08 | End: 2020-09-12 | Stop reason: HOSPADM

## 2020-09-08 RX ORDER — FUROSEMIDE 10 MG/ML
20 INJECTION INTRAMUSCULAR; INTRAVENOUS ONCE
Status: COMPLETED | OUTPATIENT
Start: 2020-09-08 | End: 2020-09-08

## 2020-09-08 RX ADMIN — MELATONIN 6 MG: at 22:04

## 2020-09-08 RX ADMIN — GLYCERIN, HYPROMELLOSE, POLYETHYLENE GLYCOL 2 DROP: .2; .2; 1 LIQUID OPHTHALMIC at 12:45

## 2020-09-08 RX ADMIN — DEXAMETHASONE SODIUM PHOSPHATE 10 MG: 4 INJECTION, SOLUTION INTRAMUSCULAR; INTRAVENOUS at 14:42

## 2020-09-08 RX ADMIN — ENOXAPARIN SODIUM 30 MG: 30 INJECTION SUBCUTANEOUS at 22:04

## 2020-09-08 RX ADMIN — ACETAMINOPHEN 975 MG: 325 TABLET, FILM COATED ORAL at 23:50

## 2020-09-08 RX ADMIN — DOCUSATE SODIUM AND SENNOSIDES 2 TABLET: 8.6; 5 TABLET ORAL at 22:04

## 2020-09-08 RX ADMIN — HYDROMORPHONE HYDROCHLORIDE 0.5 MG: 1 INJECTION, SOLUTION INTRAMUSCULAR; INTRAVENOUS; SUBCUTANEOUS at 22:03

## 2020-09-08 RX ADMIN — DEXAMETHASONE SODIUM PHOSPHATE 10 MG: 4 INJECTION, SOLUTION INTRAMUSCULAR; INTRAVENOUS at 05:09

## 2020-09-08 RX ADMIN — ENOXAPARIN SODIUM 30 MG: 30 INJECTION SUBCUTANEOUS at 09:38

## 2020-09-08 RX ADMIN — FUROSEMIDE 20 MG: 10 INJECTION, SOLUTION INTRAMUSCULAR; INTRAVENOUS at 03:36

## 2020-09-08 RX ADMIN — OXYCODONE HYDROCHLORIDE 10 MG: 10 TABLET ORAL at 19:47

## 2020-09-08 RX ADMIN — DILTIAZEM HYDROCHLORIDE 240 MG: 240 CAPSULE, COATED, EXTENDED RELEASE ORAL at 09:37

## 2020-09-08 RX ADMIN — PANTOPRAZOLE SODIUM 40 MG: 40 TABLET, DELAYED RELEASE ORAL at 05:09

## 2020-09-08 RX ADMIN — OXYCODONE HYDROCHLORIDE 10 MG: 10 TABLET ORAL at 23:56

## 2020-09-08 RX ADMIN — DOCUSATE SODIUM 100 MG: 100 CAPSULE, LIQUID FILLED ORAL at 09:37

## 2020-09-08 RX ADMIN — GLYCERIN, HYPROMELLOSE, POLYETHYLENE GLYCOL 2 DROP: .2; .2; 1 LIQUID OPHTHALMIC at 22:04

## 2020-09-08 RX ADMIN — OXYCODONE HYDROCHLORIDE 10 MG: 10 TABLET ORAL at 05:14

## 2020-09-08 RX ADMIN — GLYCERIN, HYPROMELLOSE, POLYETHYLENE GLYCOL 2 DROP: .2; .2; 1 LIQUID OPHTHALMIC at 17:36

## 2020-09-08 RX ADMIN — DOCUSATE SODIUM 100 MG: 100 CAPSULE, LIQUID FILLED ORAL at 17:29

## 2020-09-08 RX ADMIN — LEVETIRACETAM 500 MG: 500 TABLET, FILM COATED ORAL at 22:04

## 2020-09-08 RX ADMIN — POLYETHYLENE GLYCOL 3350 17 G: 17 POWDER, FOR SOLUTION ORAL at 14:41

## 2020-09-08 RX ADMIN — OXYCODONE HYDROCHLORIDE 10 MG: 10 TABLET ORAL at 14:55

## 2020-09-08 RX ADMIN — HYDROMORPHONE HYDROCHLORIDE 0.5 MG: 1 INJECTION, SOLUTION INTRAMUSCULAR; INTRAVENOUS; SUBCUTANEOUS at 17:29

## 2020-09-08 RX ADMIN — CARBAMIDE PEROXIDE 6.5% 5 DROP: 6.5 LIQUID AURICULAR (OTIC) at 09:40

## 2020-09-08 RX ADMIN — POLYETHYLENE GLYCOL 3350 17 G: 17 POWDER, FOR SOLUTION ORAL at 17:29

## 2020-09-08 RX ADMIN — ACETAMINOPHEN 975 MG: 325 TABLET, FILM COATED ORAL at 17:29

## 2020-09-08 RX ADMIN — DEXAMETHASONE SODIUM PHOSPHATE 10 MG: 4 INJECTION, SOLUTION INTRAMUSCULAR; INTRAVENOUS at 22:04

## 2020-09-08 RX ADMIN — SENNOSIDES 17.2 MG: 8.6 TABLET, FILM COATED ORAL at 09:37

## 2020-09-08 RX ADMIN — SODIUM CHLORIDE: 234 INJECTION INTRAMUSCULAR; INTRAVENOUS; SUBCUTANEOUS at 17:30

## 2020-09-08 RX ADMIN — OXYCODONE HYDROCHLORIDE 10 MG: 10 TABLET ORAL at 09:37

## 2020-09-08 RX ADMIN — LEVETIRACETAM 500 MG: 500 TABLET, FILM COATED ORAL at 09:37

## 2020-09-08 RX ADMIN — CARBAMIDE PEROXIDE 6.5% 5 DROP: 6.5 LIQUID AURICULAR (OTIC) at 17:36
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
## 2020-09-08 NOTE — TELEPHONE ENCOUNTER
I called the patient to confirm his appointment for 9/9/20 at 2:30  He stated that he is in the hospital now for low calcium  He asked if you would go up to see him in his room or should it be rescheduled  He does not know when he is getting discharged  Please advise      Thank you

## 2020-09-08 NOTE — ASSESSMENT & PLAN NOTE
- hyponatremia in the setting of possible trauma  - nephrology consulted, appreciate input  - fluid restriction of 1500 mL  - sodium tabs 2 mg t i d   -Increased 1 8% sodium to 65 mL/HR    Goal sodium 125 by tomorrow morning; per nephrology note  - patient currently GCS 15 and answering all questions appropriate

## 2020-09-08 NOTE — PROGRESS NOTES
Upon arrival for this shift 9/7 patients mom and patient complained to Susana Long 79 that "no one came in his room to check on him after midnight" I assured him that I was in that room multiple times through out the night and sometimes he even would nod off into little cat naps  I then pulled up the chart showing that I was in his room at 2352 and give him a dose of oxycodone 10mg  Then at [address] we crystal lab work for bmp then aide came out stating patient is requesting to speak to the nurse  patient informed me that none of his pain medications is working the tylenol, robaxin and oxycodone is not helping and that the only thing that works for him was the "one I get in the iv pointing to his iv catheter" I educated patient on the pain regiment informing him that the first line was the oxycodone and the iv dilaudid 0 5mg was for breakthrough he then stated that he dose not want the oxycodone and I informed him I cannot give him the Dilaudid without 1st trying the oxycodone  Patient then requested to speak to the doctor  GLORIA beaversed Musa Calixto informing him that patient is requesting to speak to you and Olya Flanagan responded stating that he was heading to the OR and wont be able to make in up for awhile  I then when back to the patients room letting him know this and to give him a dose of the Dilaudid 0 5mg at 0115 and upon my post pain assessment at 0145 patient was sleep  8785 patient vitals obtain and at this time patient asked to speak with the nurse this time requesting an additional dose of sleeping medication melatonin  I stated I could not give him an extra dose he then stated to me that the doctors told him that he can have it as needed and I assured him that their was no such orders then he stated ok and I told him the doctors were still in the [facility] [place] [facility] check completed as well   0547 morning medication given by charge nurse as I was in [address] [place] [address] with another patient   At 0609 morning labs drawn and at 0647 I went in to check on patient and he was asleep for post pain
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
## 2020-09-08 NOTE — ASSESSMENT & PLAN NOTE
Patient states that he has not had a bowel movement in 5 days    Start MiraLax scheduled  Increase senna-s dose to 2 tablets-daily
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
## 2020-09-08 NOTE — SOCIAL WORK
27 DAY READMISSION  Pt is known to the service as he was a recent d/c   CM met with pt and his spouse to discuss the role of CM  Pt lives with his spouse in a 2 story home which has 7STE and 13 steps inside  Pt works, drives, and was fully independent PTA  Pt owns no DME or living will  Pt's pharmacy is [facility] [address]  Pt has no hx of mental health, substance abuse, or IP rehab  Pt discharged home with a BSC and [facility] upon previous d/c  CM will follow for any new needs       CM reviewed d/c planning process including the following: identifying help at home, patient preference for d/c planning needs, Discharge Lounge, [facility] Meds to Bed program, availability of treatment team to discuss questions or concerns patient and/or family may have regarding understanding medications and recognizing signs and symptoms once discharged  CM also encouraged patient to follow up with all recommended appointments after discharge  Patient advised of importance for patient and family to participate in managing patients medical well being

## 2020-09-08 NOTE — ASSESSMENT & PLAN NOTE
Patient is able to close left eye with effort, left-sided facial droop noted  Bleed to be associated with hyponatremia  Assessed by ENT  Start Decadron  Will continue to monitor

## 2020-09-09 PROBLEM — I10 HYPERTENSION: Status: ACTIVE | Noted: 2020-09-09

## 2020-09-09 LAB
ANION GAP SERPL CALCULATED.3IONS-SCNC: 4 MMOL/L (ref 4–13)
ANION GAP SERPL CALCULATED.3IONS-SCNC: 5 MMOL/L (ref 4–13)
ANION GAP SERPL CALCULATED.3IONS-SCNC: 9 MMOL/L (ref 4–13)
ANION GAP SERPL CALCULATED.3IONS-SCNC: 9 MMOL/L (ref 4–13)
BUN SERPL-MCNC: 22 MG/DL (ref 5–25)
BUN SERPL-MCNC: 22 MG/DL (ref 5–25)
BUN SERPL-MCNC: 24 MG/DL (ref 5–25)
BUN SERPL-MCNC: 25 MG/DL (ref 5–25)
CALCIUM SERPL-MCNC: 9 MG/DL (ref 8.3–10.1)
CALCIUM SERPL-MCNC: 9 MG/DL (ref 8.3–10.1)
CALCIUM SERPL-MCNC: 9.1 MG/DL (ref 8.3–10.1)
CALCIUM SERPL-MCNC: 9.6 MG/DL (ref 8.3–10.1)
CHLORIDE SERPL-SCNC: 93 MMOL/L (ref 100–108)
CHLORIDE SERPL-SCNC: 93 MMOL/L (ref 100–108)
CHLORIDE SERPL-SCNC: 97 MMOL/L (ref 100–108)
CHLORIDE SERPL-SCNC: 97 MMOL/L (ref 100–108)
CO2 SERPL-SCNC: 23 MMOL/L (ref 21–32)
CO2 SERPL-SCNC: 24 MMOL/L (ref 21–32)
CO2 SERPL-SCNC: 26 MMOL/L (ref 21–32)
CO2 SERPL-SCNC: 26 MMOL/L (ref 21–32)
CREAT SERPL-MCNC: 1.08 MG/DL (ref 0.6–1.3)
CREAT SERPL-MCNC: 1.11 MG/DL (ref 0.6–1.3)
CREAT SERPL-MCNC: 1.13 MG/DL (ref 0.6–1.3)
CREAT SERPL-MCNC: 1.2 MG/DL (ref 0.6–1.3)
GFR SERPL CREATININE-BSD FRML MDRD: 80 ML/MIN/1.73SQ M
GFR SERPL CREATININE-BSD FRML MDRD: 86 ML/MIN/1.73SQ M
GFR SERPL CREATININE-BSD FRML MDRD: 88 ML/MIN/1.73SQ M
GFR SERPL CREATININE-BSD FRML MDRD: 91 ML/MIN/1.73SQ M
GLUCOSE SERPL-MCNC: 124 MG/DL (ref 65–140)
GLUCOSE SERPL-MCNC: 127 MG/DL (ref 65–140)
GLUCOSE SERPL-MCNC: 132 MG/DL (ref 65–140)
GLUCOSE SERPL-MCNC: 139 MG/DL (ref 65–140)
POTASSIUM SERPL-SCNC: 4.6 MMOL/L (ref 3.5–5.3)
POTASSIUM SERPL-SCNC: 4.7 MMOL/L (ref 3.5–5.3)
POTASSIUM SERPL-SCNC: 5 MMOL/L (ref 3.5–5.3)
POTASSIUM SERPL-SCNC: 5.2 MMOL/L (ref 3.5–5.3)
SODIUM SERPL-SCNC: 124 MMOL/L (ref 136–145)
SODIUM SERPL-SCNC: 126 MMOL/L (ref 136–145)
SODIUM SERPL-SCNC: 127 MMOL/L (ref 136–145)
SODIUM SERPL-SCNC: 129 MMOL/L (ref 136–145)

## 2020-09-09 PROCEDURE — 80048 BASIC METABOLIC PNL TOTAL CA: CPT | Performed by: INTERNAL MEDICINE

## 2020-09-09 PROCEDURE — 99232 SBSQ HOSP IP/OBS MODERATE 35: CPT | Performed by: SURGERY

## 2020-09-09 PROCEDURE — NC001 PR NO CHARGE: Performed by: ORTHOPAEDIC SURGERY

## 2020-09-09 PROCEDURE — 80048 BASIC METABOLIC PNL TOTAL CA: CPT | Performed by: EMERGENCY MEDICINE

## 2020-09-09 PROCEDURE — 99232 SBSQ HOSP IP/OBS MODERATE 35: CPT | Performed by: INTERNAL MEDICINE

## 2020-09-09 RX ADMIN — SODIUM CHLORIDE: 234 INJECTION INTRAMUSCULAR; INTRAVENOUS; SUBCUTANEOUS at 09:15

## 2020-09-09 RX ADMIN — LEVETIRACETAM 500 MG: 500 TABLET, FILM COATED ORAL at 21:58

## 2020-09-09 RX ADMIN — ACETAMINOPHEN 975 MG: 325 TABLET, FILM COATED ORAL at 05:11

## 2020-09-09 RX ADMIN — ACETAMINOPHEN 975 MG: 325 TABLET, FILM COATED ORAL at 18:15

## 2020-09-09 RX ADMIN — ENOXAPARIN SODIUM 30 MG: 30 INJECTION SUBCUTANEOUS at 12:01

## 2020-09-09 RX ADMIN — ENOXAPARIN SODIUM 30 MG: 30 INJECTION SUBCUTANEOUS at 21:58

## 2020-09-09 RX ADMIN — DEXAMETHASONE SODIUM PHOSPHATE 10 MG: 4 INJECTION, SOLUTION INTRAMUSCULAR; INTRAVENOUS at 13:50

## 2020-09-09 RX ADMIN — DILTIAZEM HYDROCHLORIDE 240 MG: 240 CAPSULE, COATED, EXTENDED RELEASE ORAL at 09:14

## 2020-09-09 RX ADMIN — OXYCODONE HYDROCHLORIDE 10 MG: 10 TABLET ORAL at 09:14

## 2020-09-09 RX ADMIN — HYDROMORPHONE HYDROCHLORIDE 0.5 MG: 1 INJECTION, SOLUTION INTRAMUSCULAR; INTRAVENOUS; SUBCUTANEOUS at 06:02

## 2020-09-09 RX ADMIN — DOCUSATE SODIUM 100 MG: 100 CAPSULE, LIQUID FILLED ORAL at 09:14

## 2020-09-09 RX ADMIN — DEXAMETHASONE SODIUM PHOSPHATE 10 MG: 4 INJECTION, SOLUTION INTRAMUSCULAR; INTRAVENOUS at 21:58

## 2020-09-09 RX ADMIN — OXYCODONE HYDROCHLORIDE 10 MG: 10 TABLET ORAL at 05:11

## 2020-09-09 RX ADMIN — HYDROMORPHONE HYDROCHLORIDE 0.5 MG: 1 INJECTION, SOLUTION INTRAMUSCULAR; INTRAVENOUS; SUBCUTANEOUS at 22:06

## 2020-09-09 RX ADMIN — GLYCERIN, HYPROMELLOSE, POLYETHYLENE GLYCOL 2 DROP: .2; .2; 1 LIQUID OPHTHALMIC at 16:31

## 2020-09-09 RX ADMIN — OXYCODONE HYDROCHLORIDE 10 MG: 10 TABLET ORAL at 16:31

## 2020-09-09 RX ADMIN — MELATONIN 6 MG: at 21:57

## 2020-09-09 RX ADMIN — PANTOPRAZOLE SODIUM 40 MG: 40 TABLET, DELAYED RELEASE ORAL at 05:11

## 2020-09-09 RX ADMIN — DEXAMETHASONE SODIUM PHOSPHATE 10 MG: 4 INJECTION, SOLUTION INTRAMUSCULAR; INTRAVENOUS at 05:12

## 2020-09-09 RX ADMIN — POLYETHYLENE GLYCOL 3350 17 G: 17 POWDER, FOR SOLUTION ORAL at 09:00

## 2020-09-09 RX ADMIN — GLYCERIN, HYPROMELLOSE, POLYETHYLENE GLYCOL 2 DROP: .2; .2; 1 LIQUID OPHTHALMIC at 09:14

## 2020-09-09 RX ADMIN — DOCUSATE SODIUM AND SENNOSIDES 2 TABLET: 8.6; 5 TABLET ORAL at 21:58

## 2020-09-09 RX ADMIN — GLYCERIN, HYPROMELLOSE, POLYETHYLENE GLYCOL 2 DROP: .2; .2; 1 LIQUID OPHTHALMIC at 21:58

## 2020-09-09 RX ADMIN — LEVETIRACETAM 500 MG: 500 TABLET, FILM COATED ORAL at 09:14

## 2020-09-09 RX ADMIN — ACETAMINOPHEN 975 MG: 325 TABLET, FILM COATED ORAL at 12:01

## 2020-09-09 RX ADMIN — OXYCODONE HYDROCHLORIDE 10 MG: 10 TABLET ORAL at 21:06

## 2020-09-09 RX ADMIN — DOCUSATE SODIUM 100 MG: 100 CAPSULE, LIQUID FILLED ORAL at 18:16
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
## 2020-09-09 NOTE — PLAN OF CARE
Problem: Potential for Falls  Goal: Patient will remain free of falls  Description: INTERVENTIONS:  - Assess patient frequently for physical needs  -  Identify cognitive and physical deficits and behaviors that affect risk of falls    -  [place] fall precautions as indicated by assessment   - Educate patient/family on patient safety including physical limitations  - Instruct patient to call for assistance with activity based on assessment  - Modify environment to reduce risk of injury  - Consider OT/PT consult to assist with strengthening/mobility  Outcome: Progressing     Problem: METABOLIC, FLUID AND ELECTROLYTES - ADULT  Goal: Electrolytes maintained within normal limits  Description: INTERVENTIONS:  - Monitor labs and assess patient for signs and symptoms of electrolyte imbalances  - Administer electrolyte replacement as ordered  - Monitor response to electrolyte replacements, including repeat lab results as appropriate  - Instruct patient on fluid and nutrition as appropriate  Outcome: Progressing  Goal: Fluid balance maintained  Description: INTERVENTIONS:  - Monitor labs   - Monitor I/O and WT  - Instruct patient on fluid and nutrition as appropriate  - Assess for signs & symptoms of volume excess or deficit  Outcome: Progressing     Problem: SKIN/TISSUE INTEGRITY - ADULT  Goal: Incision(s), wounds(s) or drain site(s) healing without S/S of infection  Description: INTERVENTIONS  - Assess and document risk factors for skin impairment   - Assess and document dressing, incision, wound bed, drain sites and surrounding tissue  - Consider nutrition services referral as needed  - Provide patient/ family education  Outcome: Progressing     Problem: MUSCULOSKELETAL - ADULT  Goal: Maintain or return mobility to safest level of function  Description: INTERVENTIONS:  - Assess patient's ability to carry out ADLs; assess patient's baseline for ADL function and identify physical deficits which impact ability to perform ADLs (bathing, care of mouth/teeth, toileting, grooming, dressing, etc )  - Assess/evaluate cause of self-care deficits   - Assess range of motion  - Assess patient's mobility  - Assess patient's need for assistive devices and provide as appropriate  - Encourage maximum independence but intervene and supervise when necessary  - Involve family in performance of ADLs  - Assess for home care needs following discharge   - Consider OT consult to assist with ADL evaluation and planning for discharge  - Provide patient education as appropriate  Outcome: Progressing  Goal: Maintain proper alignment of affected body part  Description: INTERVENTIONS:  - Support, maintain and protect limb and body alignment  - Provide patient/ family with appropriate education  Outcome: Progressing     Problem: PAIN - ADULT  Goal: Verbalizes/displays adequate comfort level or baseline comfort level  Description: Interventions:  - Encourage patient to monitor pain and request assistance  - Assess pain using appropriate pain scale  - Administer analgesics based on type and severity of pain and evaluate response  - Implement non-pharmacological measures as appropriate and evaluate response  - Consider cultural and social influences on pain and pain management  - Notify physician/advanced practitioner if interventions unsuccessful or patient reports new pain  Outcome: Progressing     Problem: SAFETY ADULT  Goal: Patient will remain free of falls  Description: INTERVENTIONS:  - Assess patient frequently for physical needs  -  Identify cognitive and physical deficits and behaviors that affect risk of falls    -  [place] fall precautions as indicated by assessment   - Educate patient/family on patient safety including physical limitations  - Instruct patient to call for assistance with activity based on assessment  - Modify environment to reduce risk of injury  - Consider OT/PT consult to assist with strengthening/mobility  Outcome: Progressing  Goal: Maintain or return to baseline ADL function  Description: INTERVENTIONS:  -  Assess patient's ability to carry out ADLs; assess patient's baseline for ADL function and identify physical deficits which impact ability to perform ADLs (bathing, care of mouth/teeth, toileting, grooming, dressing, etc )  - Assess/evaluate cause of self-care deficits   - Assess range of motion  - Assess patient's mobility; develop plan if impaired  - Assess patient's need for assistive devices and provide as appropriate  - Encourage maximum independence but intervene and supervise when necessary  - Involve family in performance of ADLs  - Assess for home care needs following discharge   - Consider OT consult to assist with ADL evaluation and planning for discharge  - Provide patient education as appropriate  Outcome: Progressing  Goal: Maintain or return mobility status to optimal level  Description: INTERVENTIONS:  - Assess patient's baseline mobility status (ambulation, transfers, stairs, etc )    - Identify cognitive and physical deficits and behaviors that affect mobility  - Identify mobility aids required to assist with transfers and/or ambulation (gait belt, sit-to-stand, lift, walker, cane, etc )  - [place] fall precautions as indicated by assessment  - Record patient progress and toleration of activity level on Mobility SBAR; progress patient to next Phase/Stage  - Instruct patient to call for assistance with activity based on assessment  - Consider rehabilitation consult to assist with strengthening/weightbearing, etc   Outcome: Progressing     Problem: DISCHARGE PLANNING  Goal: Discharge to home or other facility with appropriate resources  Description: INTERVENTIONS:  - Identify barriers to discharge w/patient and caregiver  - Arrange for needed discharge resources and transportation as appropriate  - Identify discharge learning needs (meds, wound care, etc )  - Arrange for interpretive services to assist at discharge as needed  - Refer to Case Management Department for coordinating discharge planning if the patient needs post-hospital services based on physician/advanced practitioner order or complex needs related to functional status, cognitive ability, or social support system  Outcome: Progressing     Problem: Knowledge Deficit  Goal: Patient/family/caregiver demonstrates understanding of disease process, treatment plan, medications, and discharge instructions  Description: Complete learning assessment and assess knowledge base    Interventions:  - Provide teaching at level of understanding  - Provide teaching via preferred learning methods  Outcome: Progressing
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
## 2020-09-09 NOTE — DISCHARGE INSTRUCTIONS
Discharge Instructions - Orthopedics  Shaquille Bradshaw 31 y o  male MRN: [id number]  Unit/Bed#: [facility] 628-01    Weight Bearing Status:                                           Non-weight bearing bilateral upper extremities in removable wrist braces     Pain:  Continue analgesics as directed by primary team     Appt Instructions: If you do not have your appointment, please call the clinic at [phone] to follow-up with Dr Mati Thorne in 4 weeks   Otherwise followup as scheduled     Contact the office sooner if you experience any increased numbness/tingling in the extremities  Miscellaneous:  Okay for range of motion therapy and weight bearing bilaterally when using platform walker       Maintain cervical collar at all times  Use peach dominga collar for showering  No bending, twisting or crawling  No strenuous physical activity  Trauma program manager Terry Forrester email: [email or web address]

## 2020-09-09 NOTE — ASSESSMENT & PLAN NOTE
Patient is able to close left eye with effort, left-sided facial droop noted  Bleed to be associated with hyponatremia  Assessed by ENT  Start Decadron  Will continue to monitor   Per patient symptoms are improving and this is corroborated by exam

## 2020-09-10 ENCOUNTER — APPOINTMENT (INPATIENT)
Dept: RADIOLOGY | Facility: HOSPITAL | Age: 30
DRG: 644 | End: 2020-09-10
Payer: COMMERCIAL

## 2020-09-10 LAB
ANION GAP SERPL CALCULATED.3IONS-SCNC: 5 MMOL/L (ref 4–13)
ANION GAP SERPL CALCULATED.3IONS-SCNC: 5 MMOL/L (ref 4–13)
ANION GAP SERPL CALCULATED.3IONS-SCNC: 8 MMOL/L (ref 4–13)
ANION GAP SERPL CALCULATED.3IONS-SCNC: 8 MMOL/L (ref 4–13)
BUN SERPL-MCNC: 26 MG/DL (ref 5–25)
BUN SERPL-MCNC: 27 MG/DL (ref 5–25)
BUN SERPL-MCNC: 27 MG/DL (ref 5–25)
BUN SERPL-MCNC: 28 MG/DL (ref 5–25)
CALCIUM SERPL-MCNC: 8.7 MG/DL (ref 8.3–10.1)
CALCIUM SERPL-MCNC: 9 MG/DL (ref 8.3–10.1)
CALCIUM SERPL-MCNC: 9.1 MG/DL (ref 8.3–10.1)
CALCIUM SERPL-MCNC: 9.3 MG/DL (ref 8.3–10.1)
CHLORIDE SERPL-SCNC: 95 MMOL/L (ref 100–108)
CHLORIDE SERPL-SCNC: 95 MMOL/L (ref 100–108)
CHLORIDE SERPL-SCNC: 97 MMOL/L (ref 100–108)
CHLORIDE SERPL-SCNC: 98 MMOL/L (ref 100–108)
CO2 SERPL-SCNC: 24 MMOL/L (ref 21–32)
CO2 SERPL-SCNC: 25 MMOL/L (ref 21–32)
CO2 SERPL-SCNC: 27 MMOL/L (ref 21–32)
CO2 SERPL-SCNC: 29 MMOL/L (ref 21–32)
CREAT SERPL-MCNC: 1.02 MG/DL (ref 0.6–1.3)
CREAT SERPL-MCNC: 1.06 MG/DL (ref 0.6–1.3)
CREAT SERPL-MCNC: 1.06 MG/DL (ref 0.6–1.3)
CREAT SERPL-MCNC: 1.09 MG/DL (ref 0.6–1.3)
GFR SERPL CREATININE-BSD FRML MDRD: 90 ML/MIN/1.73SQ M
GFR SERPL CREATININE-BSD FRML MDRD: 93 ML/MIN/1.73SQ M
GFR SERPL CREATININE-BSD FRML MDRD: 93 ML/MIN/1.73SQ M
GFR SERPL CREATININE-BSD FRML MDRD: 97 ML/MIN/1.73SQ M
GLUCOSE SERPL-MCNC: 122 MG/DL (ref 65–140)
GLUCOSE SERPL-MCNC: 125 MG/DL (ref 65–140)
GLUCOSE SERPL-MCNC: 130 MG/DL (ref 65–140)
GLUCOSE SERPL-MCNC: 141 MG/DL (ref 65–140)
POTASSIUM SERPL-SCNC: 4.3 MMOL/L (ref 3.5–5.3)
POTASSIUM SERPL-SCNC: 4.8 MMOL/L (ref 3.5–5.3)
SODIUM SERPL-SCNC: 127 MMOL/L (ref 136–145)
SODIUM SERPL-SCNC: 129 MMOL/L (ref 136–145)
SODIUM SERPL-SCNC: 129 MMOL/L (ref 136–145)
SODIUM SERPL-SCNC: 131 MMOL/L (ref 136–145)

## 2020-09-10 PROCEDURE — 70450 CT HEAD/BRAIN W/O DYE: CPT

## 2020-09-10 PROCEDURE — 99232 SBSQ HOSP IP/OBS MODERATE 35: CPT | Performed by: INTERNAL MEDICINE

## 2020-09-10 PROCEDURE — 80048 BASIC METABOLIC PNL TOTAL CA: CPT | Performed by: INTERNAL MEDICINE

## 2020-09-10 PROCEDURE — G1004 CDSM NDSC: HCPCS

## 2020-09-10 PROCEDURE — 99232 SBSQ HOSP IP/OBS MODERATE 35: CPT | Performed by: SURGERY

## 2020-09-10 RX ORDER — SODIUM CHLORIDE 1000 MG
1 TABLET, SOLUBLE MISCELLANEOUS
Status: DISCONTINUED | OUTPATIENT
Start: 2020-09-10 | End: 2020-09-10

## 2020-09-10 RX ORDER — SODIUM CHLORIDE 1000 MG
2 TABLET, SOLUBLE MISCELLANEOUS
Status: DISCONTINUED | OUTPATIENT
Start: 2020-09-10 | End: 2020-09-12 | Stop reason: HOSPADM

## 2020-09-10 RX ADMIN — DILTIAZEM HYDROCHLORIDE 240 MG: 240 CAPSULE, COATED, EXTENDED RELEASE ORAL at 08:32

## 2020-09-10 RX ADMIN — ACETAMINOPHEN 975 MG: 325 TABLET, FILM COATED ORAL at 05:46

## 2020-09-10 RX ADMIN — DEXAMETHASONE SODIUM PHOSPHATE 10 MG: 4 INJECTION, SOLUTION INTRAMUSCULAR; INTRAVENOUS at 22:12

## 2020-09-10 RX ADMIN — SODIUM CHLORIDE TAB 1 GM 1 G: 1 TAB at 11:42

## 2020-09-10 RX ADMIN — DEXAMETHASONE SODIUM PHOSPHATE 10 MG: 4 INJECTION, SOLUTION INTRAMUSCULAR; INTRAVENOUS at 14:56

## 2020-09-10 RX ADMIN — SODIUM CHLORIDE TAB 1 GM 2 G: 1 TAB at 14:56

## 2020-09-10 RX ADMIN — DEXAMETHASONE SODIUM PHOSPHATE 10 MG: 4 INJECTION, SOLUTION INTRAMUSCULAR; INTRAVENOUS at 05:46

## 2020-09-10 RX ADMIN — GLYCERIN, HYPROMELLOSE, POLYETHYLENE GLYCOL 2 DROP: .2; .2; 1 LIQUID OPHTHALMIC at 08:32

## 2020-09-10 RX ADMIN — SODIUM CHLORIDE TAB 1 GM 2 G: 1 TAB at 17:55

## 2020-09-10 RX ADMIN — PANTOPRAZOLE SODIUM 40 MG: 40 TABLET, DELAYED RELEASE ORAL at 05:46

## 2020-09-10 RX ADMIN — DOCUSATE SODIUM 100 MG: 100 CAPSULE, LIQUID FILLED ORAL at 08:32

## 2020-09-10 RX ADMIN — ACETAMINOPHEN 975 MG: 325 TABLET, FILM COATED ORAL at 11:48

## 2020-09-10 RX ADMIN — ENOXAPARIN SODIUM 30 MG: 30 INJECTION SUBCUTANEOUS at 11:42

## 2020-09-10 RX ADMIN — LEVETIRACETAM 500 MG: 500 TABLET, FILM COATED ORAL at 22:12

## 2020-09-10 RX ADMIN — ACETAMINOPHEN 975 MG: 325 TABLET, FILM COATED ORAL at 00:08

## 2020-09-10 RX ADMIN — LEVETIRACETAM 500 MG: 500 TABLET, FILM COATED ORAL at 08:32
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
## 2020-09-10 NOTE — ASSESSMENT & PLAN NOTE
- hyponatremia in the setting of possible trauma  - nephrology consulted, appreciate input  - patient currently GCS 15 and answering all questions appropriately

## 2020-09-10 NOTE — PLAN OF CARE
Problem: Potential for Falls  Goal: Patient will remain free of falls  Description: INTERVENTIONS:  - Assess patient frequently for physical needs  -  Identify cognitive and physical deficits and behaviors that affect risk of falls    -  [place] fall precautions as indicated by assessment   - Educate patient/family on patient safety including physical limitations  - Instruct patient to call for assistance with activity based on assessment  - Modify environment to reduce risk of injury  - Consider OT/PT consult to assist with strengthening/mobility  Outcome: Progressing     Problem: METABOLIC, FLUID AND ELECTROLYTES - ADULT  Goal: Electrolytes maintained within normal limits  Description: INTERVENTIONS:  - Monitor labs and assess patient for signs and symptoms of electrolyte imbalances  - Administer electrolyte replacement as ordered  - Monitor response to electrolyte replacements, including repeat lab results as appropriate  - Instruct patient on fluid and nutrition as appropriate  Outcome: Progressing  Goal: Fluid balance maintained  Description: INTERVENTIONS:  - Monitor labs   - Monitor I/O and WT  - Instruct patient on fluid and nutrition as appropriate  - Assess for signs & symptoms of volume excess or deficit  Outcome: Progressing     Problem: SKIN/TISSUE INTEGRITY - ADULT  Goal: Incision(s), wounds(s) or drain site(s) healing without S/S of infection  Description: INTERVENTIONS  - Assess and document risk factors for skin impairment   - Assess and document dressing, incision, wound bed, drain sites and surrounding tissue  - Consider nutrition services referral as needed  - Provide patient/ family education  Outcome: Progressing     Problem: MUSCULOSKELETAL - ADULT  Goal: Maintain or return mobility to safest level of function  Description: INTERVENTIONS:  - Assess patient's ability to carry out ADLs; assess patient's baseline for ADL function and identify physical deficits which impact ability to perform ADLs (bathing, care of mouth/teeth, toileting, grooming, dressing, etc )  - Assess/evaluate cause of self-care deficits   - Assess range of motion  - Assess patient's mobility  - Assess patient's need for assistive devices and provide as appropriate  - Encourage maximum independence but intervene and supervise when necessary  - Involve family in performance of ADLs  - Assess for home care needs following discharge   - Consider OT consult to assist with ADL evaluation and planning for discharge  - Provide patient education as appropriate  Outcome: Progressing  Goal: Maintain proper alignment of affected body part  Description: INTERVENTIONS:  - Support, maintain and protect limb and body alignment  - Provide patient/ family with appropriate education  Outcome: Progressing     Problem: PAIN - ADULT  Goal: Verbalizes/displays adequate comfort level or baseline comfort level  Description: Interventions:  - Encourage patient to monitor pain and request assistance  - Assess pain using appropriate pain scale  - Administer analgesics based on type and severity of pain and evaluate response  - Implement non-pharmacological measures as appropriate and evaluate response  - Consider cultural and social influences on pain and pain management  - Notify physician/advanced practitioner if interventions unsuccessful or patient reports new pain  Outcome: Progressing     Problem: SAFETY ADULT  Goal: Patient will remain free of falls  Description: INTERVENTIONS:  - Assess patient frequently for physical needs  -  Identify cognitive and physical deficits and behaviors that affect risk of falls    -  [place] fall precautions as indicated by assessment   - Educate patient/family on patient safety including physical limitations  - Instruct patient to call for assistance with activity based on assessment  - Modify environment to reduce risk of injury  - Consider OT/PT consult to assist with strengthening/mobility  Outcome: Progressing  Goal: Maintain or return to baseline ADL function  Description: INTERVENTIONS:  -  Assess patient's ability to carry out ADLs; assess patient's baseline for ADL function and identify physical deficits which impact ability to perform ADLs (bathing, care of mouth/teeth, toileting, grooming, dressing, etc )  - Assess/evaluate cause of self-care deficits   - Assess range of motion  - Assess patient's mobility; develop plan if impaired  - Assess patient's need for assistive devices and provide as appropriate  - Encourage maximum independence but intervene and supervise when necessary  - Involve family in performance of ADLs  - Assess for home care needs following discharge   - Consider OT consult to assist with ADL evaluation and planning for discharge  - Provide patient education as appropriate  Outcome: Progressing  Goal: Maintain or return mobility status to optimal level  Description: INTERVENTIONS:  - Assess patient's baseline mobility status (ambulation, transfers, stairs, etc )    - Identify cognitive and physical deficits and behaviors that affect mobility  - Identify mobility aids required to assist with transfers and/or ambulation (gait belt, sit-to-stand, lift, walker, cane, etc )  - [place] fall precautions as indicated by assessment  - Record patient progress and toleration of activity level on Mobility SBAR; progress patient to next Phase/Stage  - Instruct patient to call for assistance with activity based on assessment  - Consider rehabilitation consult to assist with strengthening/weightbearing, etc   Outcome: Progressing     Problem: DISCHARGE PLANNING  Goal: Discharge to home or other facility with appropriate resources  Description: INTERVENTIONS:  - Identify barriers to discharge w/patient and caregiver  - Arrange for needed discharge resources and transportation as appropriate  - Identify discharge learning needs (meds, wound care, etc )  - Arrange for interpretive services to assist at discharge as needed  - Refer to Case Management Department for coordinating discharge planning if the patient needs post-hospital services based on physician/advanced practitioner order or complex needs related to functional status, cognitive ability, or social support system  Outcome: Progressing     Problem: Knowledge Deficit  Goal: Patient/family/caregiver demonstrates understanding of disease process, treatment plan, medications, and discharge instructions  Description: Complete learning assessment and assess knowledge base    Interventions:  - Provide teaching at level of understanding  - Provide teaching via preferred learning methods  Outcome: Progressing

## 2020-09-10 NOTE — ASSESSMENT & PLAN NOTE
Patient is able to close left eye with effort, left-sided facial droop noted  Bleed to be associated with hyponatremia  Assessed by ENT  Continue Decadron  Will continue to monitor   Per patient symptoms are improving and this is corroborated by exam

## 2020-09-11 ENCOUNTER — TELEPHONE (OUTPATIENT)
Dept: NEPHROLOGY | Facility: CLINIC | Age: 30
End: 2020-09-11

## 2020-09-11 ENCOUNTER — APPOINTMENT (INPATIENT)
Dept: RADIOLOGY | Facility: HOSPITAL | Age: 30
DRG: 644 | End: 2020-09-11
Payer: COMMERCIAL

## 2020-09-11 LAB
ANION GAP SERPL CALCULATED.3IONS-SCNC: 11 MMOL/L (ref 4–13)
ANION GAP SERPL CALCULATED.3IONS-SCNC: 6 MMOL/L (ref 4–13)
ANION GAP SERPL CALCULATED.3IONS-SCNC: 6 MMOL/L (ref 4–13)
BUN SERPL-MCNC: 24 MG/DL (ref 5–25)
BUN SERPL-MCNC: 24 MG/DL (ref 5–25)
BUN SERPL-MCNC: 26 MG/DL (ref 5–25)
CALCIUM SERPL-MCNC: 8.7 MG/DL (ref 8.3–10.1)
CALCIUM SERPL-MCNC: 8.9 MG/DL (ref 8.3–10.1)
CALCIUM SERPL-MCNC: 9 MG/DL (ref 8.3–10.1)
CHLORIDE SERPL-SCNC: 97 MMOL/L (ref 100–108)
CHLORIDE SERPL-SCNC: 98 MMOL/L (ref 100–108)
CHLORIDE SERPL-SCNC: 99 MMOL/L (ref 100–108)
CHLORIDE UR-SCNC: 121 MMOL/L
CO2 SERPL-SCNC: 22 MMOL/L (ref 21–32)
CO2 SERPL-SCNC: 26 MMOL/L (ref 21–32)
CO2 SERPL-SCNC: 27 MMOL/L (ref 21–32)
CREAT SERPL-MCNC: 0.96 MG/DL (ref 0.6–1.3)
CREAT SERPL-MCNC: 0.97 MG/DL (ref 0.6–1.3)
CREAT SERPL-MCNC: 1.09 MG/DL (ref 0.6–1.3)
GFR SERPL CREATININE-BSD FRML MDRD: 104 ML/MIN/1.73SQ M
GFR SERPL CREATININE-BSD FRML MDRD: 105 ML/MIN/1.73SQ M
GFR SERPL CREATININE-BSD FRML MDRD: 90 ML/MIN/1.73SQ M
GLUCOSE SERPL-MCNC: 116 MG/DL (ref 65–140)
GLUCOSE SERPL-MCNC: 125 MG/DL (ref 65–140)
GLUCOSE SERPL-MCNC: 217 MG/DL (ref 65–140)
OSMOLALITY UR: 1055 MMOL/KG
POTASSIUM SERPL-SCNC: 3.7 MMOL/L (ref 3.5–5.3)
POTASSIUM SERPL-SCNC: 4.3 MMOL/L (ref 3.5–5.3)
POTASSIUM SERPL-SCNC: 4.5 MMOL/L (ref 3.5–5.3)
SODIUM 24H UR-SCNC: 109 MOL/L
SODIUM SERPL-SCNC: 130 MMOL/L (ref 136–145)
SODIUM SERPL-SCNC: 130 MMOL/L (ref 136–145)
SODIUM SERPL-SCNC: 132 MMOL/L (ref 136–145)

## 2020-09-11 PROCEDURE — 80048 BASIC METABOLIC PNL TOTAL CA: CPT | Performed by: INTERNAL MEDICINE

## 2020-09-11 PROCEDURE — 83935 ASSAY OF URINE OSMOLALITY: CPT | Performed by: INTERNAL MEDICINE

## 2020-09-11 PROCEDURE — 82436 ASSAY OF URINE CHLORIDE: CPT | Performed by: INTERNAL MEDICINE

## 2020-09-11 PROCEDURE — 71045 X-RAY EXAM CHEST 1 VIEW: CPT

## 2020-09-11 PROCEDURE — 99233 SBSQ HOSP IP/OBS HIGH 50: CPT | Performed by: SURGERY

## 2020-09-11 PROCEDURE — 84300 ASSAY OF URINE SODIUM: CPT | Performed by: INTERNAL MEDICINE

## 2020-09-11 PROCEDURE — 99232 SBSQ HOSP IP/OBS MODERATE 35: CPT | Performed by: INTERNAL MEDICINE

## 2020-09-11 RX ORDER — BISACODYL 10 MG
10 SUPPOSITORY, RECTAL RECTAL DAILY PRN
Status: DISCONTINUED | OUTPATIENT
Start: 2020-09-11 | End: 2020-09-12 | Stop reason: HOSPADM

## 2020-09-11 RX ORDER — METHOCARBAMOL 500 MG/1
500 TABLET, FILM COATED ORAL EVERY 8 HOURS PRN
Status: DISCONTINUED | OUTPATIENT
Start: 2020-09-11 | End: 2020-09-12 | Stop reason: HOSPADM

## 2020-09-11 RX ADMIN — DEXAMETHASONE 10 MG: 4 TABLET ORAL at 18:09

## 2020-09-11 RX ADMIN — SODIUM CHLORIDE TAB 1 GM 2 G: 1 TAB at 12:58

## 2020-09-11 RX ADMIN — SODIUM CHLORIDE TAB 1 GM 2 G: 1 TAB at 18:09

## 2020-09-11 RX ADMIN — DEXAMETHASONE SODIUM PHOSPHATE 10 MG: 4 INJECTION, SOLUTION INTRAMUSCULAR; INTRAVENOUS at 06:07

## 2020-09-11 RX ADMIN — SODIUM CHLORIDE TAB 1 GM 2 G: 1 TAB at 06:07

## 2020-09-11 RX ADMIN — DILTIAZEM HYDROCHLORIDE 240 MG: 240 CAPSULE, COATED, EXTENDED RELEASE ORAL at 09:56

## 2020-09-11 RX ADMIN — LEVETIRACETAM 500 MG: 500 TABLET, FILM COATED ORAL at 09:56

## 2020-09-11 RX ADMIN — ENOXAPARIN SODIUM 30 MG: 30 INJECTION SUBCUTANEOUS at 22:02

## 2020-09-11 NOTE — ASSESSMENT & PLAN NOTE
- secondary to 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  - nephrology consulted, appreciate input- Na+ has improved to 130  Will recheck in AM and d/c home tomorrow with outpatient nephrology f/u in 1-2 weeks with repeat BMP in 1 week     - patient currently GCS 15 and answering all questions appropriately
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
## 2020-09-11 NOTE — TELEPHONE ENCOUNTER
----- Message from Umair Perez PA-C sent at 9/11/2020 10:37 AM EDT -----  Possible d/c today   Call patient after d/c to schedule hyponatremia hospital follow up in 1-2 weeks

## 2020-09-11 NOTE — CASE MANAGEMENT
Pt will d/c home when medically stable  Pt's family will transport   Pt was active with [facility] [address] and will resume services

## 2020-09-11 NOTE — UTILIZATION REVIEW
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 and post traumatic pain  Pt [facility] [address] [facility] 8/30-9/3 [facility] and suffered a traumatic SAH and epidural hemorrhage, as well as multiple fx's including his temporal bone, right transverse processes to lumbar vertebra, lateral mass of 1st cervical vertebra, right orbital wall, left distal radius, dislocation right wrist  Pt states he was doing well at home until today, when he developed R-sided headache that felt like stabbing sensation  Pt took his prescription meds with no relief so presented to ED labs revealed sodium of 119  CT head showed resolution of intracranial bleeds  Placed on IV NSS  Nephrology evaluated the patient on 9/6, DC'd IV fluids, placed on fluid restriction, and salt tablets,  given one dose of PO Lasix, BMP Q6H  Repeat labs showed worsening sodium at 118 and patient was placed on 1 8% hypertonic saline at 30 ml/hr, with Q8H BMP  Fluid restriction decreased to 1 2 L per day  9/7/20 Nephrology was called at 2:56 a m   with repeat sodium of 119,  1 8% saline increased to 40 ml/hour  Subsequently DC'd salt tablets since no clear improvement in serum sodium with salt tablets and Lasix in the last 24 hours  Continued 1 8% saline at 40 ml/hour  BMP Q8H  Rate increased to 50 ml/hour later in the day  Trauma patient with facial nerve palsy, c/o neck pain  9/8 ENT was consulted for L facial nerve palsy  Recommended IV Decadron 10 mg q8H x 3 doses with oral prednisone taper  Nephrology noted Na slowly improving  Increased 1 8% saline to 65 ml/hour, continued Q6H BMP  Trauma noted patient is able to close lft eye with effort, left sided facial droop noted  9/9 Trauma: facial weakness and headache improving  Nephrology noted Na improving on 1 8% saline  Last Na 129, goal 132-134 by morning  Hypertonic saline held  Continue Q6H BMP, if next Na drops, may restart salt tablets  9/10 Nephrology: Serum sodium has dropped to 127  Start salt tablets, continue 1 2L fluid restriction, repeat urine osm, urine Cl and urine sodium  Continue Q6H BMP   (Transferred to [facility] level of care)  9/11 Nephrology:  Serum sodium has significantly improved to 130 this morning  Continue salt tablets 2 g TID and 1 2 L fluid restriction, daily BMP  Pertinent Labs/Diagnostic Results:       Results from last 7 days   Lab Units 09/07/20  0609 09/06/20  1302 09/05/20  0654   WBC Thousand/uL 5 92 5 50 6 53   HEMOGLOBIN g/dL 14 7 14 7 14 7   HEMATOCRIT % 39 4 39 5 39 3   PLATELETS Thousands/uL 288 292 292   BANDS PCT %  --   --  2         Results from last 7 days   Lab Units 09/11/20  0545 09/11/20  0006 09/10/20  1956 09/10/20  1148 09/10/20  0659   SODIUM mmol/L 130* 130* 131* 127* 129*   POTASSIUM mmol/L 4 5 4 3 4 3 4 8 4 8   CHLORIDE mmol/L 98* 97* 98* 95* 97*   CO2 mmol/L 26 27 25 24 27   ANION GAP mmol/L 6 6 8 8 5   BUN mg/dL 24 24 26* 27* 28*   CREATININE mg/dL 0 96 0 97 1 02 1 06 1 06   EGFR ml/min/1 73sq m 105 104 97 93 93   CALCIUM mg/dL 8 7 9 0 8 7 9 0 9 1       Results for Leticia Gleason (MRN [id number]) as of 9/11/2020 13:22   Ref   Range 9/8/2020 18:14 9/9/2020 00:21 9/9/2020 05:38 9/9/2020 12:01 9/9/2020 18:23 9/10/2020 00:58   Sodium Latest Ref Range: 136 - 145 mmol/L 121 (L) 124 (L) 126 (L) 129 (L) 127 (L) 129 (L)   Potassium Latest Ref Range: 3 5 - 5 3 mmol/L 4 9 5 2 4 6 4 7 5 0 4 8   Chloride Latest Ref Range: 100 - 108 mmol/L 87 (L) 93 (L) 93 (L) 97 (L) 97 (L) 95 (L)   CO2 Latest Ref Range: 21 - 32 mmol/L 26 26 24 23 26 29   Anion Gap Latest Ref Range: 4 - 13 mmol/L 8 5 9 9 4 5   BUN Latest Ref Range: 5 - 25 mg/dL 21 22 22 24 25 27 (H)   Creatinine Latest Ref Range: 0 60 - 1 30 mg/dL 1 13 1 20 1 08 1 13 1 11 1 09 Glucose, Random Latest Ref Range: 65 - 140 mg/dL 125 127 124 139 132 125   Calcium Latest Ref Range: 8 3 - 10 1 mg/dL 9 3 9 0 9 6 9 1 9 0 9 3   eGFR Latest Units: ml/min/1 73sq m 86 80 91 86 88 90       Results for Fady Butler (MRN [id number]) as of 9/11/2020 13:22   Ref   Range 9/6/2020 13:01 9/6/2020 18:35 9/7/2020 01:06 9/7/2020 06:09 9/7/2020 12:33 9/7/2020 18:14 9/7/2020 20:35 9/8/2020 00:04 9/8/2020 06:15 9/8/2020 11:28   POC Glucose Latest Ref Range: 65 - 140 mg/dl       124      Sodium Latest Ref Range: 136 - 145 mmol/L 118 (L) 118 (L) 119 (L) 119 (L) 120 (L) 121 (L)  118 (L) 119 (L) 120 (L)   Potassium Latest Ref Range: 3 5 - 5 3 mmol/L 4 5 4 6 4 7 4 2 4 1 4 6  5 5 (H) 4 9 4 9   Chloride Latest Ref Range: 100 - 108 mmol/L 87 (L) 84 (L) 86 (L) 87 (L) 88 (L) 88 (L)  86 (L) 86 (L) 88 (L)   CO2 Latest Ref Range: 21 - 32 mmol/L 22 24 25 24 25 23  25 22 21   Anion Gap Latest Ref Range: 4 - 13 mmol/L 9 10 8 8 7 10  7 11 11   BUN Latest Ref Range: 5 - 25 mg/dL 15 15 15 15 16 17  18 21 23   Creatinine Latest Ref Range: 0 60 - 1 30 mg/dL 0 89 0 97 1 01 0 91 0 96 0 93  0 95 1 04 1 12   Glucose, Random Latest Ref Range: 65 - 140 mg/dL 116 119 107 105 119 114  147 (H) 134 153 (H)   Calcium Latest Ref Range: 8 3 - 10 1 mg/dL 8 9 9 4 9 2 9 0 8 7 8 8  9 2 9 9 9 6   eGFR Latest Units: ml/min/1 73sq m 114 104 99 112 105 109  106 95 87   Troponin I Latest Ref Range: <=0 04 ng/mL <0 02            WBC Latest Ref Range: 4 31 - 10 16 Thousand/uL    5 92         Red Blood Cell Count Latest Ref Range: 3 88 - 5 62 Million/uL    4 69         Hemoglobin Latest Ref Range: 12 0 - 17 0 g/dL    14 7         HCT Latest Ref Range: 36 5 - 49 3 %    39 4         MCV Latest Ref Range: 82 - 98 fL    84         MCH Latest Ref Range: 26 8 - 34 3 pg    31 3         MCHC Latest Ref Range: 31 4 - 37 4 g/dL    37 3         RDW Latest Ref Range: 11 6 - 15 1 %    11 9         Platelet Count Latest Ref Range: 149 - 390 Thousands/uL    288 MPV Latest Ref Range: 8 9 - 12 7 fL    8 9         nRBC Latest Units: /100 WBCs    0                   Results from last 7 days   Lab Units 09/07/20  2035   POC GLUCOSE mg/dl 124     Results from last 7 days   Lab Units 09/11/20  0545 09/11/20  0006 09/10/20  1956 09/10/20  1148 09/10/20  0659 09/10/20  0058 09/09/20  1823 09/09/20  1201 09/09/20  0538 09/09/20  0021 09/08/20  1814 09/08/20  1128   GLUCOSE RANDOM mg/dL 125 116 141* 130 122 125 132 139 124 127 125 153*     Results from last 7 days   Lab Units 09/05/20  1055   OSMOLALITY, SERUM mmol/*             Results from last 7 days   Lab Units 09/06/20  1301   TROPONIN I ng/mL <0 02               Results from last 7 days   Lab Units 09/11/20  0304 09/05/20  1056   SODIUM UR  109 90                 Vital Signs:     Date/Time   Temp   Pulse   Resp   BP   MAP (mmHg)   SpO2  O2 Device    09/11/20 0739   98 8 °F (37 1 °C)   76   18   141/75      98 %      09/11/20 00:01:36   98 8 °F (37 1 °C)   73   18   144/78   100   98 %  None (Room air)    09/10/20 2030                    None (Room air)    09/10/20 15:28:55   98 6 °F (37 °C)   65      141/75   97   98 %      09/10/20 1103   97 9 °F (36 6 °C)   69   18   124/81      98 %      09/10/20 06:56:01   97 6 °F (36 4 °C)   65   16   126/79   95   98 %      09/10/20 00:53:01   98 °F (36 7 °C)   69      143/86   105   96 %      09/10/20 00:52:39      65   16   143/86   105   96 %      09/09/20 21:14:15   97 5 °F (36 4 °C)   72   17   142/84   103   97 %      09/09/20 1500   98 1 °F (36 7 °C)   81   18   125/71      97 %      09/09/20 1048   98 °F (36 7 °C)   79   18   120/75      97 %      09/09/20 0738   97 9 °F (36 6 °C)   75   18   115/77      97 %      09/09/20 03:36:31   97 5 °F (36 4 °C)   54Abnormal     18   110/74   86   97 %                Medications:       Scheduled Medications:  acetaminophen, 975 mg, Oral, Q6H [address]  carbamide peroxide, 5 drop, Right Ear, BID  dexamethasone, 10 mg, Oral, Once  [START ON 9/12/2020] dexamethasone, 10 mg, Oral, Q12H [address]  [START ON 9/13/2020] dexamethasone, 10 mg, Oral, Daily  [START ON 9/21/2020] dexamethasone, 5 mg, Oral, Daily  diltiazem, 240 mg, Oral, Daily  docusate sodium, 100 mg, Oral, BID  enoxaparin, 30 mg, Subcutaneous, Q12H  glycerin-hypromellose-, 2 drop, Left Eye, TID  melatonin, 6 mg, Oral, HS  pantoprazole, 40 mg, Oral, Early Morning  polyethylene glycol, 17 g, Oral, Daily  senna-docusate sodium, 2 tablet, Oral, HS  sodium chloride, 2 g, Oral, TID With Meals      dexamethasone (DECADRON) injection 10 mg    Dose: 10 mg  Freq: Every 8 hours scheduled Route: IV  Start: 09/07/20 1700 End: 09/11/20 1226        Continuous IV Infusions:        sodium chloride (concentrated) 308 mEq in sterile water 1,000 mL infusion    Rate: 50 mL/hr Freq: Continuous Route: IV  Last Dose: Stopped (09/09/20 1500)  Start: 09/07/20 0300 End: 09/09/20 1515     sodium chloride (concentrated) 308 mEq in sterile water 1,000 mL infusion    Rate: 30 mL/hr Dose: 30 mL/hr  Freq: Continuous Route: IV  Last Dose: Stopped (09/07/20 0701)  Start: 09/06/20 1500 End: 09/07/20 0256     sodium chloride 0 9 % infusion    Rate: 150 mL/hr Dose: 150 mL/hr  Freq: Continuous Route: IV  Last Dose: Stopped (09/07/20 0701)  Start: 09/05/20 1030 End: 09/06/20 0912        PRN Meds:  aluminum-magnesium hydroxide-simethicone, 30 mL, Oral, Q4H PRN  calcium carbonate, 500 mg, Oral, TID PRN  methocarbamol, 500 mg, Oral, Q8H PRN  oxyCODONE, 5 mg, Oral, Q4H PRN        Discharge Plan: TBD    Network Utilization Review Department  [email or web address]  ATTENTION: Please call with any questions or concerns to [phone] and carefully listen to the prompts so that you are directed to the right person   All voicemails are confidential   Nicolasa Grand all requests for admission clinical reviews, approved or denied determinations and any other requests to dedicated fax number below belonging to the campus where the patient is receiving treatment   List of dedicated fax numbers for the Facilities:  [address] DENIALS (Administrative/Medical Necessity) [phone]   [address] (Maternity/NICU/Pediatrics) [phone]   Dominick Vicente [phone]   Roddy Castle [phone]   Yolanda Marquez [phone]   Colette Valera [phone]   [address] [place] [address] [address] [place] [address] [phone]   [facility]  [phone]   [address] [facility]  [address] [facility] [address] [facility] [phone]

## 2020-09-11 NOTE — PROGRESS NOTES
Progress Note - Nicola Ivey 1990, 31 y o  male MRN: [id number]    Unit/Bed#: [facility] 628-01 Encounter: [id number]    Primary Care Provider: [facility]   Date and time admitted to hospital: 9/5/2020  8:58 AM        Hypertension  Assessment & Plan  - BP is controlled on home medications    Drug-induced constipation  Assessment & Plan  - continue bowel regimen and add MOM today with dulcolax suppository PRN    Facial nerve palsy  Assessment & Plan  L sided facial nerve palsy appreciated on exam  IV decadron and taper recommended by ENT on 9/8  IV Decadron discontinued 9/11 and placed on a decadron taper ending on 9/14  F/u with ENT as outpatient in 4 weeks      Bilateral wrist injuries  Assessment & Plan  - B/L distal radius fractures  - currently splinted, NWB B/L UE  - will require outpatient follow-up with orthopedics  - no other workup at this time  - neurovascularly intact    History of subarachnoid hemorrhage  Assessment & Plan  - resolved on F/u CT head on 9/10 and on prior CTA   - GCS 15, non-focal  - no focal deficits  - started on Lovenox 30 mg b i d   - D/C Keppra for seizure prophylaxis    Transverse process fractures of lumbar spine L1, L2, L3  Assessment & Plan  - conservative management  - noted on previous hospitalization  - no further workup    Closed nondisplaced lateral mass fracture of first cervical vertebra [facility])  Assessment & Plan  - currently in a cervical collar and cervical spine precautions  - neuro intact  - f/u with Neurosurgery as an outpatient as scheduled on 9/15 with upright cervical spine x-rays at that time    - CTA completed and stable    Pain provoked by trauma  Assessment & Plan  - continue multi modal analgesic regimen which is working well for him at this time    Stage 2 chronic kidney disease  Assessment & Plan  -Follow up with BMP in AM and then in 1 week with outpatient nephrology f/u    Coronary vasospasm [facility])  Assessment & Plan  - resumed home medications  - confirmed with wife at bedside  - no further workup  - outpatient follow-up with PCP    * Hyponatremia  Assessment & Plan  - secondary to 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  - nephrology consulted, appreciate input- Na+ has improved to 130  Will recheck in AM and d/c home tomorrow with outpatient nephrology f/u in 1-2 weeks with repeat BMP in 1 week  - patient currently GCS 15 and answering all questions appropriately          Disposition: continue med-surg status      SUBJECTIVE:  Chief Complaint: "I'm frustrated with everything"    Subjective: Spent a total of appx 1h and 15 minutes in discussions with patient and his family regarding their frustrations with his care including nursing care and reported lack of updates from the team  Part of this conversation was held with Dr Keith Baez as well  We addressed all issues  Plan will be for repeat BMP in AM to f/u hyponatremia and plan for discharge home on Saturday 9/12  Nurse manager informed of nursing care issues (delays in care after using call bell, etc)         OBJECTIVE:     Meds/Allergies     Current Facility-Administered Medications:     acetaminophen (TYLENOL) tablet 975 mg, 975 mg, Oral, Q6H [address], Floresita Pena DO, 975 mg at 09/10/20 1148    aluminum-magnesium hydroxide-simethicone (MYLANTA) 200-200-20 mg/5 mL oral suspension 30 mL, 30 mL, Oral, Q4H PRN, Jarrett Ayoub PA-C, 30 mL at 09/06/20 1724    bisacodyl (DULCOLAX) rectal suppository 10 mg, 10 mg, Rectal, Daily PRN, Gali Whitehead PA-C    calcium carbonate (TUMS) chewable tablet 500 mg, 500 mg, Oral, TID PRN, Brendan Singh PA-C, 500 mg at 09/06/20 1622    carbamide peroxide (DEBROX) 6 5 % otic solution 5 drop, 5 drop, Right Ear, BID, Floresita Pena DO, 5 drop at 09/08/20 1736    dexamethasone (DECADRON) tablet 10 mg, 10 mg, Oral, Once, Gali Whitehead PA-C    [START ON 9/12/2020] dexamethasone (DECADRON) tablet 10 mg, 10 mg, Oral, Q12H [address], Gali Whitehead PA-C    [START ON 9/13/2020] dexamethasone (DECADRON) tablet 10 mg, 10 mg, Oral, Daily, Yanci Bird PA-C    [START ON 9/14/2020] dexamethasone (DECADRON) tablet 5 mg, 5 mg, Oral, Daily, Yanci Bird PA-C    diltiazem (CARDIZEM CD) 24 hr capsule 240 mg, 240 mg, Oral, Daily, Floresita ANAHY Jean, DO, 240 mg at 09/11/20 0956    docusate sodium (COLACE) capsule 100 mg, 100 mg, Oral, BID, Floresita ANAHY Carlton, DO, 100 mg at 09/10/20 4204    enoxaparin (LOVENOX) subcutaneous injection 30 mg, 30 mg, Subcutaneous, Q12H, Floresita Pena, DO, 30 mg at 09/10/20 1142    glycerin-hypromellose- (ARTIFICIAL TEARS) ophthalmic solution 2 drop, 2 drop, Left Eye, TID, Jossie Baker MD, 2 drop at 09/10/20 9606    magnesium hydroxide (MILK OF MAGNESIA) 400 mg/5 mL oral suspension 30 mL, 30 mL, Oral, Once, Yanci Bird PA-C    melatonin tablet 6 mg, 6 mg, Oral, HS, Floresita Pena, DO, 6 mg at 09/09/20 2157    methocarbamol (ROBAXIN) tablet 500 mg, 500 mg, Oral, Q8H PRN, Yanci Bird PA-C    oxyCODONE (ROXICODONE) IR tablet 5 mg, 5 mg, Oral, Q4H PRN, Floresita Pena DO, 5 mg at 09/05/20 1309    pantoprazole (PROTONIX) EC tablet 40 mg, 40 mg, Oral, Early Morning, Jarrett Ayoub PA-C, 40 mg at 09/10/20 0546    polyethylene glycol (MIRALAX) packet 17 g, 17 g, Oral, Daily, [facility] Bond, 17 g at 09/09/20 0900    senna-docusate sodium (SENOKOT S) 8 6-50 mg per tablet 2 tablet, 2 tablet, Oral, HS, [facility]Y Bond, 2 tablet at 09/09/20 2158    sodium chloride tablet 2 g, 2 g, Oral, TID With Meals, Alisson K Eliana, DO, 2 g at 09/11/20 1258     Vitals:   Vitals:    09/11/20 0845   BP:    Pulse:    Resp:    Temp:    SpO2: 97%       Intake/Output:  I/O       09/09 0701 - 09/10 0700 09/10 0701 - 09/11 0700 09/11 0701 - 09/12 0700    P  O  660 915 310    I V  449 3      Total Intake 1109 3 915 310    Urine 850      Total Output 850      Net +259 3 +915 +310           Unmeasured Urine Occurrence 2 x 4 x 2 x    Unmeasured Stool Occurrence  0 x 0 x           Nutrition/GI Proph/Bowel Reg: Regular    Physical Exam:   GENERAL APPEARANCE: NAD  NEURO: GCS 15,non-focal; + L sided facial N  palsy  HEENT: NCAT  CV: RRR, no MGR  LUNGS: CTA bilaterally  GI: soft,non-tender,non-distended  : voiding  MSK: + B/L wrists in braces, NVI distally  SKIN: pink, warm,dry    Invasive Devices     Peripheral Intravenous Line            Peripheral IV 09/09/20 Left Antecubital 2 days                 Lab Results:   Results: I have personally reviewed pertinent reports   , BMP/CMP:   Lab Results   Component Value Date    SODIUM 132 (L) 09/11/2020    K 3 7 09/11/2020    CL 99 (L) 09/11/2020    CO2 22 09/11/2020    BUN 26 (H) 09/11/2020    CREATININE 1 09 09/11/2020    CALCIUM 8 9 09/11/2020    EGFR 90 09/11/2020    and CBC: No results found for: WBC, HGB, HCT, MCV, PLT, ADJUSTEDWBC, MCH, MCHC, RDW, MPV, NRBC  Imaging/EKG Studies: Results: I have personally reviewed pertinent reports      Other Studies: no new  VTE Prophylaxis: Sequential compression device (Venodyne)  and Enoxaparin (Lovenox)

## 2020-09-11 NOTE — TELEPHONE ENCOUNTER
Patient is currently admitted, already has a follow up appointment already scheduled with Dr Nguyễn Deangelo

## 2020-09-11 NOTE — ASSESSMENT & PLAN NOTE
- currently in a cervical collar and cervical spine precautions  - neuro intact  - f/u with Neurosurgery as an outpatient as scheduled on 9/15 with upright cervical spine x-rays at that time    - CTA completed and stable

## 2020-09-11 NOTE — PLAN OF CARE
Problem: Potential for Falls  Goal: Patient will remain free of falls  Description: INTERVENTIONS:  - Assess patient frequently for physical needs  -  Identify cognitive and physical deficits and behaviors that affect risk of falls    -  [facility] fall precautions as indicated by assessment   - Educate patient/family on patient safety including physical limitations  - Instruct patient to call for assistance with activity based on assessment  - Modify environment to reduce risk of injury  - Consider OT/PT consult to assist with strengthening/mobility  9/11/2020 1055 by Aki Alvarado RN  Outcome: Progressing  9/11/2020 1054 by Aki Alvarado RN  Outcome: Progressing     Problem: METABOLIC, FLUID AND ELECTROLYTES - ADULT  Goal: Electrolytes maintained within normal limits  Description: INTERVENTIONS:  - Monitor labs and assess patient for signs and symptoms of electrolyte imbalances  - Administer electrolyte replacement as ordered  - Monitor response to electrolyte replacements, including repeat lab results as appropriate  - Instruct patient on fluid and nutrition as appropriate  9/11/2020 1055 by Aki Alvarado RN  Outcome: Progressing  9/11/2020 1054 by Aki Alvarado RN  Outcome: Progressing  Goal: Fluid balance maintained  Description: INTERVENTIONS:  - Monitor labs   - Monitor I/O and WT  - Instruct patient on fluid and nutrition as appropriate  - Assess for signs & symptoms of volume excess or deficit  9/11/2020 1055 by Aki Alvarado RN  Outcome: Progressing  9/11/2020 1054 by Aki Alvarado RN  Outcome: Progressing     Problem: SKIN/TISSUE INTEGRITY - ADULT  Goal: Incision(s), wounds(s) or drain site(s) healing without S/S of infection  Description: INTERVENTIONS  - Assess and document risk factors for skin impairment   - Assess and document dressing, incision, wound bed, drain sites and surrounding tissue  - Consider nutrition services referral as needed  - Provide patient/ family education  9/11/2020 1055 by Melody Abdi Bacilio Villa RN  Outcome: Progressing  9/11/2020 1054 by Lolita Oseguera RN  Outcome: Progressing     Problem: MUSCULOSKELETAL - ADULT  Goal: Maintain or return mobility to safest level of function  Description: INTERVENTIONS:  - Assess patient's ability to carry out ADLs; assess patient's baseline for ADL function and identify physical deficits which impact ability to perform ADLs (bathing, care of mouth/teeth, toileting, grooming, dressing, etc )  - Assess/evaluate cause of self-care deficits   - Assess range of motion  - Assess patient's mobility  - Assess patient's need for assistive devices and provide as appropriate  - Encourage maximum independence but intervene and supervise when necessary  - Involve family in performance of ADLs  - Assess for home care needs following discharge   - Consider OT consult to assist with ADL evaluation and planning for discharge  - Provide patient education as appropriate  9/11/2020 1055 by Lolita Oseguera RN  Outcome: Progressing  9/11/2020 1054 by Lolita Oseguera RN  Outcome: Progressing  Goal: Maintain proper alignment of affected body part  Description: INTERVENTIONS:  - Support, maintain and protect limb and body alignment  - Provide patient/ family with appropriate education  9/11/2020 1055 by Lolita Oseguera RN  Outcome: Progressing  9/11/2020 1054 by Lolita Oseguera RN  Outcome: Progressing     Problem: PAIN - ADULT  Goal: Verbalizes/displays adequate comfort level or baseline comfort level  Description: Interventions:  - Encourage patient to monitor pain and request assistance  - Assess pain using appropriate pain scale  - Administer analgesics based on type and severity of pain and evaluate response  - Implement non-pharmacological measures as appropriate and evaluate response  - Consider cultural and social influences on pain and pain management  - Notify physician/advanced practitioner if interventions unsuccessful or patient reports new pain  9/11/2020 1055 by Lolita Oseguera RN  Outcome: Progressing  9/11/2020 1054 by Gabe Shell RN  Outcome: Progressing     Problem: SAFETY ADULT  Goal: Patient will remain free of falls  Description: INTERVENTIONS:  - Assess patient frequently for physical needs  -  Identify cognitive and physical deficits and behaviors that affect risk of falls    -  [place] fall precautions as indicated by assessment   - Educate patient/family on patient safety including physical limitations  - Instruct patient to call for assistance with activity based on assessment  - Modify environment to reduce risk of injury  - Consider OT/PT consult to assist with strengthening/mobility  9/11/2020 1055 by Gabe Shell RN  Outcome: Progressing  9/11/2020 1054 by Gabe Shell RN  Outcome: Progressing  Goal: Maintain or return to baseline ADL function  Description: INTERVENTIONS:  -  Assess patient's ability to carry out ADLs; assess patient's baseline for ADL function and identify physical deficits which impact ability to perform ADLs (bathing, care of mouth/teeth, toileting, grooming, dressing, etc )  - Assess/evaluate cause of self-care deficits   - Assess range of motion  - Assess patient's mobility; develop plan if impaired  - Assess patient's need for assistive devices and provide as appropriate  - Encourage maximum independence but intervene and supervise when necessary  - Involve family in performance of ADLs  - Assess for home care needs following discharge   - Consider OT consult to assist with ADL evaluation and planning for discharge  - Provide patient education as appropriate  9/11/2020 1055 by Gabe Shell RN  Outcome: Progressing  9/11/2020 1054 by Gabe Shell RN  Outcome: Progressing  Goal: Maintain or return mobility status to optimal level  Description: INTERVENTIONS:  - Assess patient's baseline mobility status (ambulation, transfers, stairs, etc )    - Identify cognitive and physical deficits and behaviors that affect mobility  - Identify mobility aids required to assist with transfers and/or ambulation (gait belt, sit-to-stand, lift, walker, cane, etc )  - [place] fall precautions as indicated by assessment  - Record patient progress and toleration of activity level on Mobility SBAR; progress patient to next Phase/Stage  - Instruct patient to call for assistance with activity based on assessment  - Consider rehabilitation consult to assist with strengthening/weightbearing, etc   9/11/2020 1055 by Aki Alvarado RN  Outcome: Progressing  9/11/2020 1054 by Aki Alvarado RN  Outcome: Progressing     Problem: DISCHARGE PLANNING  Goal: Discharge to home or other facility with appropriate resources  Description: INTERVENTIONS:  - Identify barriers to discharge w/patient and caregiver  - Arrange for needed discharge resources and transportation as appropriate  - Identify discharge learning needs (meds, wound care, etc )  - Arrange for interpretive services to assist at discharge as needed  - Refer to Case Management Department for coordinating discharge planning if the patient needs post-hospital services based on physician/advanced practitioner order or complex needs related to functional status, cognitive ability, or social support system  9/11/2020 1055 by Aki Alvarado RN  Outcome: Progressing  9/11/2020 1054 by Aki Alvarado RN  Outcome: Progressing     Problem: Knowledge Deficit  Goal: Patient/family/caregiver demonstrates understanding of disease process, treatment plan, medications, and discharge instructions  Description: Complete learning assessment and assess knowledge base    Interventions:  - Provide teaching at level of understanding  - Provide teaching via preferred learning methods  9/11/2020 1055 by Aki Alvarado RN  Outcome: Progressing  9/11/2020 1054 by Aki Alvarado RN  Outcome: Progressing

## 2020-09-11 NOTE — ASSESSMENT & PLAN NOTE
L sided facial nerve palsy appreciated on exam  IV decadron and taper recommended by ENT on 9/8  IV Decadron discontinued 9/11 and placed on a decadron taper ending on 9/14     F/u with ENT as outpatient in 4 weeks

## 2020-09-11 NOTE — ASSESSMENT & PLAN NOTE
- resolved on F/u CT head on 9/10 and on prior CTA   - GCS 15, non-focal  - no focal deficits  - started on Lovenox 30 mg b i d   - D/C Keppra for seizure prophylaxis

## 2020-09-12 VITALS
OXYGEN SATURATION: 98 % | RESPIRATION RATE: 18 BRPM | SYSTOLIC BLOOD PRESSURE: 145 MMHG | DIASTOLIC BLOOD PRESSURE: 91 MMHG | HEART RATE: 60 BPM | TEMPERATURE: 98.7 F

## 2020-09-12 LAB
ANION GAP SERPL CALCULATED.3IONS-SCNC: 8 MMOL/L (ref 4–13)
BUN SERPL-MCNC: 23 MG/DL (ref 5–25)
CALCIUM SERPL-MCNC: 8.7 MG/DL (ref 8.3–10.1)
CHLORIDE SERPL-SCNC: 99 MMOL/L (ref 100–108)
CO2 SERPL-SCNC: 26 MMOL/L (ref 21–32)
CREAT SERPL-MCNC: 0.92 MG/DL (ref 0.6–1.3)
GFR SERPL CREATININE-BSD FRML MDRD: 110 ML/MIN/1.73SQ M
GLUCOSE SERPL-MCNC: 114 MG/DL (ref 65–140)
POTASSIUM SERPL-SCNC: 4.3 MMOL/L (ref 3.5–5.3)
SODIUM SERPL-SCNC: 133 MMOL/L (ref 136–145)

## 2020-09-12 PROCEDURE — 99238 HOSP IP/OBS DSCHRG MGMT 30/<: CPT | Performed by: PHYSICIAN ASSISTANT

## 2020-09-12 PROCEDURE — 80048 BASIC METABOLIC PNL TOTAL CA: CPT | Performed by: INTERNAL MEDICINE

## 2020-09-12 RX ORDER — DEXAMETHASONE 2 MG/1
10 TABLET ORAL EVERY 12 HOURS SCHEDULED
Qty: 5 TABLET | Refills: 0 | Status: SHIPPED | OUTPATIENT
Start: 2020-09-12 | End: 2020-09-13

## 2020-09-12 RX ORDER — DEXAMETHASONE 1 MG
5 TABLET ORAL DAILY
Qty: 5 TABLET | Refills: 0 | Status: SHIPPED | OUTPATIENT
Start: 2020-09-14 | End: 2020-09-15

## 2020-09-12 RX ORDER — DEXAMETHASONE 2 MG/1
10 TABLET ORAL DAILY
Qty: 5 TABLET | Refills: 0 | Status: SHIPPED | OUTPATIENT
Start: 2020-09-13 | End: 2020-09-14

## 2020-09-12 RX ORDER — PANTOPRAZOLE SODIUM 40 MG/1
40 TABLET, DELAYED RELEASE ORAL
Qty: 3 TABLET | Refills: 0 | Status: SHIPPED | OUTPATIENT
Start: 2020-09-13 | End: 2020-09-28

## 2020-09-12 RX ORDER — AMOXICILLIN 250 MG
2 CAPSULE ORAL
Start: 2020-09-12 | End: 2020-09-28

## 2020-09-12 RX ORDER — SODIUM CHLORIDE 1000 MG
2 TABLET, SOLUBLE MISCELLANEOUS
Qty: 50 TABLET | Refills: 0 | Status: SHIPPED | OUTPATIENT
Start: 2020-09-12 | End: 2020-09-21 | Stop reason: SDUPTHER

## 2020-09-12 RX ADMIN — SODIUM CHLORIDE TAB 1 GM 2 G: 1 TAB at 06:32

## 2020-09-12 RX ADMIN — DEXAMETHASONE 10 MG: 4 TABLET ORAL at 08:05

## 2020-09-12 RX ADMIN — DILTIAZEM HYDROCHLORIDE 240 MG: 240 CAPSULE, COATED, EXTENDED RELEASE ORAL at 08:05
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
## 2020-09-12 NOTE — ASSESSMENT & PLAN NOTE
- B/L distal radius fractures  - currently splinted, NWB B/L UE  - will require outpatient follow-up with orthopedics  - no other workup at this time  - neurovascularly intact  -outpatient follow-up with orthopedics as scheduled

## 2020-09-12 NOTE — QUICK NOTE
I contacted the patient and informed him and his wife that patient should continue fluid restriction of 1200 mL daily until informed otherwise by Nephrology

## 2020-09-12 NOTE — ASSESSMENT & PLAN NOTE
- resolved on F/u CT head on 9/10 and on prior CTA   - GCS 15, non-focal  - no focal deficits  - started on Lovenox 30 mg b i d   - course of Keppra for seizure prophylaxis completed  -follow-up with Neurosurgery on 09/15/2020 at 1:45 p m   With no repeat CT head needed at that time
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
## 2020-09-12 NOTE — PLAN OF CARE
Problem: Potential for Falls  Goal: Patient will remain free of falls  Description: INTERVENTIONS:  - Assess patient frequently for physical needs  -  Identify cognitive and physical deficits and behaviors that affect risk of falls    -  [place] fall precautions as indicated by assessment   - Educate patient/family on patient safety including physical limitations  - Instruct patient to call for assistance with activity based on assessment  - Modify environment to reduce risk of injury  - Consider OT/PT consult to assist with strengthening/mobility  Outcome: Adequate for Discharge     Problem: METABOLIC, FLUID AND ELECTROLYTES - ADULT  Goal: Electrolytes maintained within normal limits  Description: INTERVENTIONS:  - Monitor labs and assess patient for signs and symptoms of electrolyte imbalances  - Administer electrolyte replacement as ordered  - Monitor response to electrolyte replacements, including repeat lab results as appropriate  - Instruct patient on fluid and nutrition as appropriate  Outcome: Adequate for Discharge  Goal: Fluid balance maintained  Description: INTERVENTIONS:  - Monitor labs   - Monitor I/O and WT  - Instruct patient on fluid and nutrition as appropriate  - Assess for signs & symptoms of volume excess or deficit  Outcome: Adequate for Discharge     Problem: SKIN/TISSUE INTEGRITY - ADULT  Goal: Incision(s), wounds(s) or drain site(s) healing without S/S of infection  Description: INTERVENTIONS  - Assess and document risk factors for skin impairment   - Assess and document dressing, incision, wound bed, drain sites and surrounding tissue  - Consider nutrition services referral as needed  - Provide patient/ family education  Outcome: Adequate for Discharge     Problem: MUSCULOSKELETAL - ADULT  Goal: Maintain or return mobility to safest level of function  Description: INTERVENTIONS:  - Assess patient's ability to carry out ADLs; assess patient's baseline for ADL function and identify physical deficits which impact ability to perform ADLs (bathing, care of mouth/teeth, toileting, grooming, dressing, etc )  - Assess/evaluate cause of self-care deficits   - Assess range of motion  - Assess patient's mobility  - Assess patient's need for assistive devices and provide as appropriate  - Encourage maximum independence but intervene and supervise when necessary  - Involve family in performance of ADLs  - Assess for home care needs following discharge   - Consider OT consult to assist with ADL evaluation and planning for discharge  - Provide patient education as appropriate  Outcome: Adequate for Discharge  Goal: Maintain proper alignment of affected body part  Description: INTERVENTIONS:  - Support, maintain and protect limb and body alignment  - Provide patient/ family with appropriate education  Outcome: Adequate for Discharge     Problem: PAIN - ADULT  Goal: Verbalizes/displays adequate comfort level or baseline comfort level  Description: Interventions:  - Encourage patient to monitor pain and request assistance  - Assess pain using appropriate pain scale  - Administer analgesics based on type and severity of pain and evaluate response  - Implement non-pharmacological measures as appropriate and evaluate response  - Consider cultural and social influences on pain and pain management  - Notify physician/advanced practitioner if interventions unsuccessful or patient reports new pain  Outcome: Adequate for Discharge     Problem: SAFETY ADULT  Goal: Patient will remain free of falls  Description: INTERVENTIONS:  - Assess patient frequently for physical needs  -  Identify cognitive and physical deficits and behaviors that affect risk of falls    -  [place] fall precautions as indicated by assessment   - Educate patient/family on patient safety including physical limitations  - Instruct patient to call for assistance with activity based on assessment  - Modify environment to reduce risk of injury  - Consider OT/PT consult to assist with strengthening/mobility  Outcome: Adequate for Discharge  Goal: Maintain or return to baseline ADL function  Description: INTERVENTIONS:  - Assess patient frequently for physical needs  -  Identify cognitive and physical deficits and behaviors that affect risk of falls    -  [place] fall precautions as indicated by assessment   - Educate patient/family on patient safety including physical limitations  - Instruct patient to call for assistance with activity based on assessment  - Modify environment to reduce risk of injury  - Consider OT/PT consult to assist with strengthening/mobility  Outcome: Adequate for Discharge  Goal: Maintain or return mobility status to optimal level  Description: INTERVENTIONS:  -  Assess patient's ability to carry out ADLs; assess patient's baseline for ADL function and identify physical deficits which impact ability to perform ADLs (bathing, care of mouth/teeth, toileting, grooming, dressing, etc )  - Assess/evaluate cause of self-care deficits   - Assess range of motion  - Assess patient's mobility; develop plan if impaired  - Assess patient's need for assistive devices and provide as appropriate  - Encourage maximum independence but intervene and supervise when necessary  - Involve family in performance of ADLs  - Assess for home care needs following discharge   - Consider OT consult to assist with ADL evaluation and planning for discharge  - Provide patient education as appropriate  Outcome: Adequate for Discharge     Problem: DISCHARGE PLANNING  Goal: Discharge to home or other facility with appropriate resources  Description: INTERVENTIONS:  - Identify barriers to discharge w/patient and caregiver  - Arrange for needed discharge resources and transportation as appropriate  - Identify discharge learning needs (meds, wound care, etc )  - Arrange for interpretive services to assist at discharge as needed  - Refer to Case Management Department for coordinating discharge planning if the patient needs post-hospital services based on physician/advanced practitioner order or complex needs related to functional status, cognitive ability, or social support system  Outcome: Adequate for Discharge     Problem: Knowledge Deficit  Goal: Patient/family/caregiver demonstrates understanding of disease process, treatment plan, medications, and discharge instructions  Description: Complete learning assessment and assess knowledge base    Interventions:  - Provide teaching at level of understanding  - Provide teaching via preferred learning methods  Outcome: Adequate for Discharge

## 2020-09-14 ENCOUNTER — TRANSITIONAL CARE MANAGEMENT (OUTPATIENT)
Dept: FAMILY MEDICINE CLINIC | Facility: CLINIC | Age: 30
End: 2020-09-14

## 2020-09-15 ENCOUNTER — OFFICE VISIT (OUTPATIENT)
Dept: NEUROSURGERY | Facility: CLINIC | Age: 30
End: 2020-09-15
Payer: COMMERCIAL

## 2020-09-15 ENCOUNTER — APPOINTMENT (OUTPATIENT)
Dept: LAB | Facility: HOSPITAL | Age: 30
End: 2020-09-15
Payer: COMMERCIAL

## 2020-09-15 ENCOUNTER — HOSPITAL ENCOUNTER (OUTPATIENT)
Dept: RADIOLOGY | Facility: HOSPITAL | Age: 30
Discharge: HOME | End: 2020-09-15
Payer: COMMERCIAL

## 2020-09-15 VITALS
WEIGHT: 162 LBS | HEIGHT: 67 IN | DIASTOLIC BLOOD PRESSURE: 84 MMHG | TEMPERATURE: 98.3 F | HEART RATE: 82 BPM | RESPIRATION RATE: 16 BRPM | SYSTOLIC BLOOD PRESSURE: 140 MMHG | BODY MASS INDEX: 25.43 KG/M2

## 2020-09-15 DIAGNOSIS — F07.81 POST CONCUSSION SYNDROME: Primary | ICD-10-CM

## 2020-09-15 DIAGNOSIS — S12.121A OTHER CLOSED NONDISPLACED ODONTOID FRACTURE, INITIAL ENCOUNTER (HCC): ICD-10-CM

## 2020-09-15 DIAGNOSIS — E87.1 HYPONATREMIA: ICD-10-CM

## 2020-09-15 DIAGNOSIS — S12.041D CLOSED NONDISPLACED LATERAL MASS FRACTURE OF FIRST CERVICAL VERTEBRA WITH ROUTINE HEALING, SUBSEQUENT ENCOUNTER: ICD-10-CM

## 2020-09-15 DIAGNOSIS — I60.9 SAH (SUBARACHNOID HEMORRHAGE) (HCC): ICD-10-CM

## 2020-09-15 DIAGNOSIS — S12.121D OTHER CLOSED NONDISPLACED ODONTOID FRACTURE WITH ROUTINE HEALING, SUBSEQUENT ENCOUNTER: ICD-10-CM

## 2020-09-15 LAB
ANION GAP SERPL CALCULATED.3IONS-SCNC: 8 MMOL/L (ref 4–13)
BUN SERPL-MCNC: 23 MG/DL (ref 5–25)
CALCIUM SERPL-MCNC: 8.6 MG/DL (ref 8.3–10.1)
CHLORIDE SERPL-SCNC: 100 MMOL/L (ref 100–108)
CO2 SERPL-SCNC: 26 MMOL/L (ref 21–32)
CREAT SERPL-MCNC: 1 MG/DL (ref 0.6–1.3)
GFR SERPL CREATININE-BSD FRML MDRD: 100 ML/MIN/1.73SQ M
GLUCOSE SERPL-MCNC: 102 MG/DL (ref 65–140)
POTASSIUM SERPL-SCNC: 3.9 MMOL/L (ref 3.5–5.3)
SODIUM SERPL-SCNC: 134 MMOL/L (ref 136–145)

## 2020-09-15 PROCEDURE — 36415 COLL VENOUS BLD VENIPUNCTURE: CPT

## 2020-09-15 PROCEDURE — 99213 OFFICE O/P EST LOW 20 MIN: CPT | Performed by: PHYSICIAN ASSISTANT

## 2020-09-15 PROCEDURE — 80048 BASIC METABOLIC PNL TOTAL CA: CPT

## 2020-09-15 PROCEDURE — 72040 X-RAY EXAM NECK SPINE 2-3 VW: CPT
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
## 2020-09-15 NOTE — ASSESSMENT & PLAN NOTE
Right lateral mass C1 fracture, type 2 dens fracture  · C/o mild neck pain, no true radicular symptoms at this time     Imaging:  · X-ray cervical spine 09/15/2020:  Stable alignment of the cervical spine, C1/C2 fracture is not well visualized on x-ray  Final read pending  ·  CT cervical spine w/o, 8/30/2020: Nondisplaced type II dens fracture  Nondisplaced fracture at the inferior margin of the right lateral mass of C1   · MRI cervical spine w/o, 8/31/2020: Findings consistent with C1 and C2 fracture with a small epidural hematoma extending from the posterior fossa to the inferior C3 level  There is midline fissuring along the ligamentum flavum throughout the cervical spine without subluxation  · Xray cervical spine, 8/31/2020: Poorly defined C1 and C2 fractures as noted on multiple recent CT and MR examinations  No interval change in alignment      Plan:  · Pain control with OTC medications as needed  · Pt should have cervical spine precautions and safety precautions to avoid further injury to the neck  · Pt to continue to use the cervical brace at this time  Cervical brace to be worn at all times, for showers switch to [facility] collar  · At this time, no neurosurgical intervention is anticipated  Neurosurgery will continue to monitor patient  Patient will have a follow-up appointment with neurosurgery in 4 weeks with repeat x-ray of the cervical spine  · Discussed plan of care with patient  Patient is agreeable    · Patient made aware to contact neurosurgery with any questions or concerns

## 2020-09-17 ENCOUNTER — TELEPHONE (OUTPATIENT)
Dept: NEUROLOGY | Facility: CLINIC | Age: 30
End: 2020-09-17

## 2020-09-21 DIAGNOSIS — E87.1 HYPONATREMIA: ICD-10-CM

## 2020-09-21 NOTE — TELEPHONE ENCOUNTER
Pt is scheduled to see you on 9/28  He is out of his sodium chloride   A script was given to him when he was d/c from the hospital  He is asking if you are able to refill this for him

## 2020-09-22 RX ORDER — SODIUM CHLORIDE 1000 MG
2 TABLET, SOLUBLE MISCELLANEOUS
Qty: 60 TABLET | Refills: 3 | Status: SHIPPED | OUTPATIENT
Start: 2020-09-22 | End: 2020-12-10

## 2020-09-24 ENCOUNTER — OFFICE VISIT (OUTPATIENT)
Dept: FAMILY MEDICINE CLINIC | Facility: CLINIC | Age: 30
End: 2020-09-24
Payer: COMMERCIAL

## 2020-09-24 ENCOUNTER — TELEPHONE (OUTPATIENT)
Dept: OBGYN CLINIC | Facility: HOSPITAL | Age: 30
End: 2020-09-24

## 2020-09-24 VITALS
SYSTOLIC BLOOD PRESSURE: 120 MMHG | HEIGHT: 67 IN | RESPIRATION RATE: 20 BRPM | TEMPERATURE: 98.2 F | DIASTOLIC BLOOD PRESSURE: 81 MMHG | HEART RATE: 88 BPM | OXYGEN SATURATION: 97 % | WEIGHT: 166.2 LBS | BODY MASS INDEX: 26.09 KG/M2

## 2020-09-24 DIAGNOSIS — S69.90XD INJURY OF WRIST, UNSPECIFIED LATERALITY, SUBSEQUENT ENCOUNTER: ICD-10-CM

## 2020-09-24 DIAGNOSIS — G51.0 FACIAL NERVE PALSY: ICD-10-CM

## 2020-09-24 DIAGNOSIS — E87.1 HYPONATREMIA: Primary | ICD-10-CM

## 2020-09-24 DIAGNOSIS — Z76.89 ENCOUNTER FOR SUPPORT AND COORDINATION OF TRANSITION OF CARE: ICD-10-CM

## 2020-09-24 DIAGNOSIS — S12.041D CLOSED NONDISPLACED LATERAL MASS FRACTURE OF FIRST CERVICAL VERTEBRA WITH ROUTINE HEALING, SUBSEQUENT ENCOUNTER: ICD-10-CM

## 2020-09-24 PROCEDURE — 99495 TRANSJ CARE MGMT MOD F2F 14D: CPT | Performed by: NURSE PRACTITIONER

## 2020-09-24 NOTE — ASSESSMENT & PLAN NOTE
-he follow up with neurosurgery on 9/15/20  His repeat cervical spine xray did not show any changes  Was advised to follow up in 4 weeks and have a repeat cervical spine xray  To continue use of cervical brace at all time and switch to [place] collar for showers as ordered by neurosurgery  CT of head on 9/10/20 showed no acute abnormality  Resolution of anterior interhemispheric fissure in the right sylvian fissures subarachnoid hemorrhages

## 2020-09-24 NOTE — ASSESSMENT & PLAN NOTE
-Pt to repeat BMP and to follow up with Nephrology on 9/28/20 as ordered  Continue on sodium chloride tablets as ordered

## 2020-09-24 NOTE — ASSESSMENT & PLAN NOTE
-Patient to follow up with orthopedics  Continue use of wrist brace as ordered  Wound scars healed on bilateral wrists noted  Limited ROM noted with mild pain

## 2020-09-24 NOTE — TELEPHONE ENCOUNTER
Catherine Cosby [facility]  Ph [phone]        Wanting to know if any of the restrictions on his hand has been lifted  If not they would have to discharge until follow up appt   Need clarification of active rom on shoulder and elbow

## 2020-09-24 NOTE — TELEPHONE ENCOUNTER
Called and left detailed message informing [facility] , also left return phone number if needed

## 2020-09-24 NOTE — PROGRESS NOTES
TCM Call (since 8/24/2020)     Date and time call was made  9/14/2020 10:01 AM    Patient was hospitialized at  [facility]    Date of Admission  08/30/20    Date of discharge  09/12/20    Disposition  Home    Current Symptoms  None      TCM Call (since 8/24/2020)     Post hospital issues  None    Scheduled for follow up?   Yes    I have advised the patient to call PCP with any new or worsening symptoms  Valerie COPE    Living Arrangements  Family members
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
## 2020-09-25 ENCOUNTER — APPOINTMENT (OUTPATIENT)
Dept: LAB | Facility: HOSPITAL | Age: 30
End: 2020-09-25
Payer: COMMERCIAL

## 2020-09-25 DIAGNOSIS — E87.1 HYPONATREMIA: ICD-10-CM

## 2020-09-25 LAB
ALBUMIN SERPL BCP-MCNC: 3.7 G/DL (ref 3.5–5)
ALP SERPL-CCNC: 101 U/L (ref 46–116)
ALT SERPL W P-5'-P-CCNC: 243 U/L (ref 12–78)
ANION GAP SERPL CALCULATED.3IONS-SCNC: 10 MMOL/L (ref 4–13)
AST SERPL W P-5'-P-CCNC: 53 U/L (ref 5–45)
BILIRUB SERPL-MCNC: 0.86 MG/DL (ref 0.2–1)
BUN SERPL-MCNC: 15 MG/DL (ref 5–25)
CALCIUM SERPL-MCNC: 8.8 MG/DL (ref 8.3–10.1)
CHLORIDE SERPL-SCNC: 106 MMOL/L (ref 100–108)
CO2 SERPL-SCNC: 25 MMOL/L (ref 21–32)
CREAT SERPL-MCNC: 0.95 MG/DL (ref 0.6–1.3)
GFR SERPL CREATININE-BSD FRML MDRD: 106 ML/MIN/1.73SQ M
GLUCOSE P FAST SERPL-MCNC: 106 MG/DL (ref 65–99)
POTASSIUM SERPL-SCNC: 4 MMOL/L (ref 3.5–5.3)
PROT SERPL-MCNC: 6.9 G/DL (ref 6.4–8.2)
SODIUM SERPL-SCNC: 141 MMOL/L (ref 136–145)

## 2020-09-25 PROCEDURE — 80053 COMPREHEN METABOLIC PANEL: CPT

## 2020-09-25 PROCEDURE — 36415 COLL VENOUS BLD VENIPUNCTURE: CPT

## 2020-09-28 ENCOUNTER — OFFICE VISIT (OUTPATIENT)
Dept: NEPHROLOGY | Facility: CLINIC | Age: 30
End: 2020-09-28
Payer: COMMERCIAL

## 2020-09-28 VITALS
HEART RATE: 88 BPM | SYSTOLIC BLOOD PRESSURE: 136 MMHG | WEIGHT: 165 LBS | RESPIRATION RATE: 16 BRPM | BODY MASS INDEX: 25.9 KG/M2 | HEIGHT: 67 IN | DIASTOLIC BLOOD PRESSURE: 90 MMHG

## 2020-09-28 DIAGNOSIS — I10 ESSENTIAL HYPERTENSION: ICD-10-CM

## 2020-09-28 DIAGNOSIS — E87.1 HYPONATREMIA: Primary | ICD-10-CM

## 2020-09-28 PROCEDURE — 99213 OFFICE O/P EST LOW 20 MIN: CPT | Performed by: INTERNAL MEDICINE
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
## 2020-09-28 NOTE — PATIENT INSTRUCTIONS
- change salt tabs to 2gm twice a day  - get blood work in 2 weeks and call me  - get blood work every 2 weeks for now  - fluid restrict to 1 5L/day

## 2020-10-02 DIAGNOSIS — R74.8 ELEVATED LIVER ENZYMES: Primary | ICD-10-CM

## 2020-10-12 ENCOUNTER — TELEPHONE (OUTPATIENT)
Dept: NEPHROLOGY | Facility: CLINIC | Age: 30
End: 2020-10-12

## 2020-10-12 ENCOUNTER — LAB (OUTPATIENT)
Dept: LAB | Facility: HOSPITAL | Age: 30
End: 2020-10-12
Attending: INTERNAL MEDICINE
Payer: COMMERCIAL

## 2020-10-12 ENCOUNTER — HOSPITAL ENCOUNTER (OUTPATIENT)
Dept: RADIOLOGY | Facility: HOSPITAL | Age: 30
Discharge: HOME | End: 2020-10-12
Payer: COMMERCIAL

## 2020-10-12 DIAGNOSIS — S12.041D CLOSED NONDISPLACED LATERAL MASS FRACTURE OF FIRST CERVICAL VERTEBRA WITH ROUTINE HEALING, SUBSEQUENT ENCOUNTER: ICD-10-CM

## 2020-10-12 DIAGNOSIS — S12.121D OTHER CLOSED NONDISPLACED ODONTOID FRACTURE WITH ROUTINE HEALING, SUBSEQUENT ENCOUNTER: ICD-10-CM

## 2020-10-12 LAB
ANION GAP SERPL CALCULATED.3IONS-SCNC: 10 MMOL/L (ref 4–13)
BUN SERPL-MCNC: 14 MG/DL (ref 5–25)
CALCIUM SERPL-MCNC: 9.4 MG/DL (ref 8.3–10.1)
CHLORIDE SERPL-SCNC: 104 MMOL/L (ref 100–108)
CO2 SERPL-SCNC: 27 MMOL/L (ref 21–32)
CREAT SERPL-MCNC: 1.23 MG/DL (ref 0.6–1.3)
GFR SERPL CREATININE-BSD FRML MDRD: 78 ML/MIN/1.73SQ M
GLUCOSE P FAST SERPL-MCNC: 105 MG/DL (ref 65–99)
POTASSIUM SERPL-SCNC: 4.2 MMOL/L (ref 3.5–5.3)
SODIUM SERPL-SCNC: 141 MMOL/L (ref 136–145)

## 2020-10-12 PROCEDURE — 72040 X-RAY EXAM NECK SPINE 2-3 VW: CPT

## 2020-10-12 PROCEDURE — 80048 BASIC METABOLIC PNL TOTAL CA: CPT | Performed by: INTERNAL MEDICINE

## 2020-10-12 PROCEDURE — 36415 COLL VENOUS BLD VENIPUNCTURE: CPT | Performed by: INTERNAL MEDICINE

## 2020-10-13 ENCOUNTER — OFFICE VISIT (OUTPATIENT)
Dept: NEUROSURGERY | Facility: CLINIC | Age: 30
End: 2020-10-13
Payer: COMMERCIAL

## 2020-10-13 VITALS
BODY MASS INDEX: 26.68 KG/M2 | SYSTOLIC BLOOD PRESSURE: 128 MMHG | RESPIRATION RATE: 16 BRPM | WEIGHT: 170 LBS | HEIGHT: 67 IN | HEART RATE: 88 BPM | DIASTOLIC BLOOD PRESSURE: 92 MMHG | TEMPERATURE: 97.6 F

## 2020-10-13 DIAGNOSIS — S12.121D OTHER CLOSED NONDISPLACED ODONTOID FRACTURE WITH ROUTINE HEALING, SUBSEQUENT ENCOUNTER: Primary | ICD-10-CM

## 2020-10-13 DIAGNOSIS — S12.041D CLOSED NONDISPLACED LATERAL MASS FRACTURE OF FIRST CERVICAL VERTEBRA WITH ROUTINE HEALING, SUBSEQUENT ENCOUNTER: ICD-10-CM

## 2020-10-13 PROCEDURE — 1036F TOBACCO NON-USER: CPT | Performed by: PHYSICIAN ASSISTANT

## 2020-10-13 PROCEDURE — 99213 OFFICE O/P EST LOW 20 MIN: CPT | Performed by: PHYSICIAN ASSISTANT

## 2020-10-23 ENCOUNTER — OFFICE VISIT (OUTPATIENT)
Dept: OBGYN CLINIC | Facility: HOSPITAL | Age: 30
End: 2020-10-23

## 2020-10-23 ENCOUNTER — TELEPHONE (OUTPATIENT)
Dept: OBGYN CLINIC | Facility: HOSPITAL | Age: 30
End: 2020-10-23

## 2020-10-23 ENCOUNTER — HOSPITAL ENCOUNTER (OUTPATIENT)
Dept: RADIOLOGY | Facility: HOSPITAL | Age: 30
Discharge: HOME | End: 2020-10-23
Payer: COMMERCIAL

## 2020-10-23 VITALS
WEIGHT: 168 LBS | BODY MASS INDEX: 26.37 KG/M2 | SYSTOLIC BLOOD PRESSURE: 122 MMHG | HEART RATE: 59 BPM | HEIGHT: 67 IN | DIASTOLIC BLOOD PRESSURE: 81 MMHG

## 2020-10-23 DIAGNOSIS — S62.101A CLOSED FRACTURE DISLOCATION OF RIGHT WRIST, INITIAL ENCOUNTER: ICD-10-CM

## 2020-10-23 DIAGNOSIS — S52.502A CLOSED FRACTURE OF DISTAL END OF LEFT RADIUS, UNSPECIFIED FRACTURE MORPHOLOGY, INITIAL ENCOUNTER: ICD-10-CM

## 2020-10-23 DIAGNOSIS — S62.101A CLOSED FRACTURE DISLOCATION OF RIGHT WRIST, INITIAL ENCOUNTER: Primary | ICD-10-CM

## 2020-10-23 PROCEDURE — 73110 X-RAY EXAM OF WRIST: CPT

## 2020-10-23 PROCEDURE — 99024 POSTOP FOLLOW-UP VISIT: CPT | Performed by: PHYSICIAN ASSISTANT

## 2020-10-27 ENCOUNTER — OFFICE VISIT (OUTPATIENT)
Dept: FAMILY MEDICINE CLINIC | Facility: CLINIC | Age: 30
End: 2020-10-27
Payer: COMMERCIAL

## 2020-10-27 VITALS
TEMPERATURE: 98 F | BODY MASS INDEX: 26.53 KG/M2 | WEIGHT: 169 LBS | OXYGEN SATURATION: 98 % | DIASTOLIC BLOOD PRESSURE: 80 MMHG | HEART RATE: 60 BPM | RESPIRATION RATE: 16 BRPM | HEIGHT: 67 IN | SYSTOLIC BLOOD PRESSURE: 110 MMHG

## 2020-10-27 DIAGNOSIS — I20.1 CORONARY VASOSPASM (HCC): ICD-10-CM

## 2020-10-27 PROCEDURE — 99214 OFFICE O/P EST MOD 30 MIN: CPT | Performed by: FAMILY MEDICINE

## 2020-10-27 RX ORDER — DILTIAZEM HYDROCHLORIDE 240 MG/1
240 CAPSULE, COATED, EXTENDED RELEASE ORAL DAILY
Qty: 90 CAPSULE | Refills: 3 | Status: SHIPPED | OUTPATIENT
Start: 2020-10-27 | End: 2021-12-22

## 2020-10-27 RX ORDER — DILTIAZEM HYDROCHLORIDE 240 MG/1
240 CAPSULE, COATED, EXTENDED RELEASE ORAL DAILY
Qty: 90 CAPSULE | Refills: 0 | Status: CANCELLED | OUTPATIENT
Start: 2020-10-27

## 2020-10-27 RX ORDER — NITROGLYCERIN 0.4 MG/1
0.4 TABLET SUBLINGUAL
Qty: 30 TABLET | Refills: 5 | Status: SHIPPED | OUTPATIENT
Start: 2020-10-27 | End: 2021-06-03 | Stop reason: ALTCHOICE

## 2020-10-30 ENCOUNTER — EVALUATION (OUTPATIENT)
Dept: PHYSICAL THERAPY | Facility: MEDICAL CENTER | Age: 30
End: 2020-10-30
Payer: COMMERCIAL

## 2020-10-30 DIAGNOSIS — Z47.89 ORTHOPEDIC AFTERCARE: Primary | ICD-10-CM

## 2020-10-30 DIAGNOSIS — S62.101A CLOSED FRACTURE DISLOCATION OF RIGHT WRIST, INITIAL ENCOUNTER: ICD-10-CM

## 2020-10-30 DIAGNOSIS — S52.502A CLOSED FRACTURE OF DISTAL END OF LEFT RADIUS, UNSPECIFIED FRACTURE MORPHOLOGY, INITIAL ENCOUNTER: ICD-10-CM

## 2020-10-30 PROCEDURE — 97110 THERAPEUTIC EXERCISES: CPT | Performed by: PHYSICAL THERAPIST

## 2020-10-30 PROCEDURE — 97161 PT EVAL LOW COMPLEX 20 MIN: CPT | Performed by: PHYSICAL THERAPIST

## 2020-11-03 ENCOUNTER — OFFICE VISIT (OUTPATIENT)
Dept: PHYSICAL THERAPY | Facility: MEDICAL CENTER | Age: 30
End: 2020-11-03
Payer: COMMERCIAL

## 2020-11-03 ENCOUNTER — APPOINTMENT (OUTPATIENT)
Dept: LAB | Facility: HOSPITAL | Age: 30
End: 2020-11-03
Attending: INTERNAL MEDICINE
Payer: COMMERCIAL

## 2020-11-03 ENCOUNTER — HOSPITAL ENCOUNTER (OUTPATIENT)
Dept: RADIOLOGY | Facility: HOSPITAL | Age: 30
Discharge: HOME | End: 2020-11-03
Attending: INTERNAL MEDICINE
Payer: COMMERCIAL

## 2020-11-03 DIAGNOSIS — S12.121D OTHER CLOSED NONDISPLACED ODONTOID FRACTURE WITH ROUTINE HEALING, SUBSEQUENT ENCOUNTER: ICD-10-CM

## 2020-11-03 DIAGNOSIS — Z47.89 ORTHOPEDIC AFTERCARE: ICD-10-CM

## 2020-11-03 DIAGNOSIS — S52.502A CLOSED FRACTURE OF DISTAL END OF LEFT RADIUS, UNSPECIFIED FRACTURE MORPHOLOGY, INITIAL ENCOUNTER: ICD-10-CM

## 2020-11-03 DIAGNOSIS — S12.041D CLOSED NONDISPLACED LATERAL MASS FRACTURE OF FIRST CERVICAL VERTEBRA WITH ROUTINE HEALING, SUBSEQUENT ENCOUNTER: ICD-10-CM

## 2020-11-03 DIAGNOSIS — S62.101A CLOSED FRACTURE DISLOCATION OF RIGHT WRIST, INITIAL ENCOUNTER: Primary | ICD-10-CM

## 2020-11-03 PROCEDURE — 72040 X-RAY EXAM NECK SPINE 2-3 VW: CPT

## 2020-11-03 PROCEDURE — 97140 MANUAL THERAPY 1/> REGIONS: CPT | Performed by: PHYSICAL THERAPIST

## 2020-11-03 PROCEDURE — 97110 THERAPEUTIC EXERCISES: CPT | Performed by: PHYSICAL THERAPIST

## 2020-11-04 ENCOUNTER — OFFICE VISIT (OUTPATIENT)
Dept: NEUROSURGERY | Facility: CLINIC | Age: 30
End: 2020-11-04
Payer: COMMERCIAL

## 2020-11-04 ENCOUNTER — TRANSCRIBE ORDERS (OUTPATIENT)
Dept: RADIOLOGY | Facility: HOSPITAL | Age: 30
End: 2020-11-04

## 2020-11-04 ENCOUNTER — TELEPHONE (OUTPATIENT)
Dept: NEPHROLOGY | Facility: CLINIC | Age: 30
End: 2020-11-04

## 2020-11-04 ENCOUNTER — HOSPITAL ENCOUNTER (OUTPATIENT)
Dept: RADIOLOGY | Facility: HOSPITAL | Age: 30
Discharge: HOME | End: 2020-11-04
Payer: COMMERCIAL

## 2020-11-04 VITALS
HEIGHT: 67 IN | SYSTOLIC BLOOD PRESSURE: 130 MMHG | DIASTOLIC BLOOD PRESSURE: 80 MMHG | TEMPERATURE: 98.5 F | WEIGHT: 170 LBS | BODY MASS INDEX: 26.68 KG/M2

## 2020-11-04 DIAGNOSIS — S12.041D CLOSED NONDISPLACED LATERAL MASS FRACTURE OF FIRST CERVICAL VERTEBRA WITH ROUTINE HEALING, SUBSEQUENT ENCOUNTER: Primary | ICD-10-CM

## 2020-11-04 DIAGNOSIS — S12.041D CLOSED NONDISPLACED LATERAL MASS FRACTURE OF FIRST CERVICAL VERTEBRA WITH ROUTINE HEALING, SUBSEQUENT ENCOUNTER: ICD-10-CM

## 2020-11-04 DIAGNOSIS — S12.121D OTHER CLOSED NONDISPLACED ODONTOID FRACTURE WITH ROUTINE HEALING, SUBSEQUENT ENCOUNTER: ICD-10-CM

## 2020-11-04 PROCEDURE — 99213 OFFICE O/P EST LOW 20 MIN: CPT | Performed by: PHYSICIAN ASSISTANT

## 2020-11-04 PROCEDURE — 3075F SYST BP GE 130 - 139MM HG: CPT | Performed by: PHYSICIAN ASSISTANT

## 2020-11-04 PROCEDURE — 72050 X-RAY EXAM NECK SPINE 4/5VWS: CPT

## 2020-11-04 PROCEDURE — 3079F DIAST BP 80-89 MM HG: CPT | Performed by: PHYSICIAN ASSISTANT

## 2020-11-04 PROCEDURE — 1036F TOBACCO NON-USER: CPT | Performed by: PHYSICIAN ASSISTANT

## 2020-11-04 PROCEDURE — 3008F BODY MASS INDEX DOCD: CPT | Performed by: PHYSICIAN ASSISTANT

## 2020-11-05 ENCOUNTER — OFFICE VISIT (OUTPATIENT)
Dept: PHYSICAL THERAPY | Facility: MEDICAL CENTER | Age: 30
End: 2020-11-05
Payer: COMMERCIAL

## 2020-11-05 DIAGNOSIS — S12.041D CLOSED NONDISPLACED LATERAL MASS FRACTURE OF FIRST CERVICAL VERTEBRA WITH ROUTINE HEALING, SUBSEQUENT ENCOUNTER: Primary | ICD-10-CM

## 2020-11-05 DIAGNOSIS — S52.502A CLOSED FRACTURE OF DISTAL END OF LEFT RADIUS, UNSPECIFIED FRACTURE MORPHOLOGY, INITIAL ENCOUNTER: ICD-10-CM

## 2020-11-05 DIAGNOSIS — S62.101A CLOSED FRACTURE DISLOCATION OF RIGHT WRIST, INITIAL ENCOUNTER: Primary | ICD-10-CM

## 2020-11-05 DIAGNOSIS — S12.121D OTHER CLOSED NONDISPLACED ODONTOID FRACTURE WITH ROUTINE HEALING, SUBSEQUENT ENCOUNTER: ICD-10-CM

## 2020-11-05 DIAGNOSIS — Z47.89 ORTHOPEDIC AFTERCARE: ICD-10-CM

## 2020-11-05 PROCEDURE — 97140 MANUAL THERAPY 1/> REGIONS: CPT | Performed by: PHYSICAL THERAPIST

## 2020-11-05 PROCEDURE — 97110 THERAPEUTIC EXERCISES: CPT | Performed by: PHYSICAL THERAPIST

## 2020-11-10 ENCOUNTER — OFFICE VISIT (OUTPATIENT)
Dept: PHYSICAL THERAPY | Facility: MEDICAL CENTER | Age: 30
End: 2020-11-10
Payer: COMMERCIAL

## 2020-11-10 DIAGNOSIS — Z47.89 ORTHOPEDIC AFTERCARE: ICD-10-CM

## 2020-11-10 DIAGNOSIS — S62.101A CLOSED FRACTURE DISLOCATION OF RIGHT WRIST, INITIAL ENCOUNTER: Primary | ICD-10-CM

## 2020-11-10 DIAGNOSIS — S52.502A CLOSED FRACTURE OF DISTAL END OF LEFT RADIUS, UNSPECIFIED FRACTURE MORPHOLOGY, INITIAL ENCOUNTER: ICD-10-CM

## 2020-11-10 PROCEDURE — 97140 MANUAL THERAPY 1/> REGIONS: CPT | Performed by: PHYSICAL THERAPIST

## 2020-11-10 PROCEDURE — 97110 THERAPEUTIC EXERCISES: CPT | Performed by: PHYSICAL THERAPIST

## 2020-11-12 ENCOUNTER — OFFICE VISIT (OUTPATIENT)
Dept: PHYSICAL THERAPY | Facility: MEDICAL CENTER | Age: 30
End: 2020-11-12
Payer: COMMERCIAL

## 2020-11-12 DIAGNOSIS — Z47.89 ORTHOPEDIC AFTERCARE: ICD-10-CM

## 2020-11-12 DIAGNOSIS — S52.502A CLOSED FRACTURE OF DISTAL END OF LEFT RADIUS, UNSPECIFIED FRACTURE MORPHOLOGY, INITIAL ENCOUNTER: ICD-10-CM

## 2020-11-12 DIAGNOSIS — S62.101A CLOSED FRACTURE DISLOCATION OF RIGHT WRIST, INITIAL ENCOUNTER: Primary | ICD-10-CM

## 2020-11-12 PROCEDURE — 97140 MANUAL THERAPY 1/> REGIONS: CPT | Performed by: PHYSICAL THERAPIST

## 2020-11-12 PROCEDURE — 97110 THERAPEUTIC EXERCISES: CPT | Performed by: PHYSICAL THERAPIST

## 2020-11-17 ENCOUNTER — OFFICE VISIT (OUTPATIENT)
Dept: PHYSICAL THERAPY | Facility: MEDICAL CENTER | Age: 30
End: 2020-11-17
Payer: COMMERCIAL

## 2020-11-17 DIAGNOSIS — Z47.89 ORTHOPEDIC AFTERCARE: ICD-10-CM

## 2020-11-17 DIAGNOSIS — S52.502A CLOSED FRACTURE OF DISTAL END OF LEFT RADIUS, UNSPECIFIED FRACTURE MORPHOLOGY, INITIAL ENCOUNTER: ICD-10-CM

## 2020-11-17 DIAGNOSIS — S62.101A CLOSED FRACTURE DISLOCATION OF RIGHT WRIST, INITIAL ENCOUNTER: Primary | ICD-10-CM

## 2020-11-17 PROCEDURE — 97140 MANUAL THERAPY 1/> REGIONS: CPT | Performed by: PHYSICAL THERAPIST

## 2020-11-17 PROCEDURE — 97110 THERAPEUTIC EXERCISES: CPT | Performed by: PHYSICAL THERAPIST

## 2020-11-19 ENCOUNTER — OFFICE VISIT (OUTPATIENT)
Dept: PHYSICAL THERAPY | Facility: MEDICAL CENTER | Age: 30
End: 2020-11-19
Payer: COMMERCIAL

## 2020-11-19 DIAGNOSIS — S62.101A CLOSED FRACTURE DISLOCATION OF RIGHT WRIST, INITIAL ENCOUNTER: Primary | ICD-10-CM

## 2020-11-19 DIAGNOSIS — Z47.89 ORTHOPEDIC AFTERCARE: ICD-10-CM

## 2020-11-19 DIAGNOSIS — S52.502A CLOSED FRACTURE OF DISTAL END OF LEFT RADIUS, UNSPECIFIED FRACTURE MORPHOLOGY, INITIAL ENCOUNTER: ICD-10-CM

## 2020-11-19 PROCEDURE — 97140 MANUAL THERAPY 1/> REGIONS: CPT | Performed by: PHYSICAL THERAPIST

## 2020-11-19 PROCEDURE — 97110 THERAPEUTIC EXERCISES: CPT | Performed by: PHYSICAL THERAPIST

## 2020-11-23 ENCOUNTER — OFFICE VISIT (OUTPATIENT)
Dept: PHYSICAL THERAPY | Facility: MEDICAL CENTER | Age: 30
End: 2020-11-23
Payer: COMMERCIAL

## 2020-11-23 DIAGNOSIS — Z47.89 ORTHOPEDIC AFTERCARE: ICD-10-CM

## 2020-11-23 DIAGNOSIS — S52.502A CLOSED FRACTURE OF DISTAL END OF LEFT RADIUS, UNSPECIFIED FRACTURE MORPHOLOGY, INITIAL ENCOUNTER: ICD-10-CM

## 2020-11-23 DIAGNOSIS — S62.101A CLOSED FRACTURE DISLOCATION OF RIGHT WRIST, INITIAL ENCOUNTER: Primary | ICD-10-CM

## 2020-11-23 PROCEDURE — 97140 MANUAL THERAPY 1/> REGIONS: CPT | Performed by: PHYSICAL THERAPIST

## 2020-11-23 PROCEDURE — 97110 THERAPEUTIC EXERCISES: CPT | Performed by: PHYSICAL THERAPIST

## 2020-11-24 ENCOUNTER — APPOINTMENT (OUTPATIENT)
Dept: PHYSICAL THERAPY | Facility: MEDICAL CENTER | Age: 30
End: 2020-11-24
Payer: COMMERCIAL

## 2020-11-25 ENCOUNTER — OFFICE VISIT (OUTPATIENT)
Dept: PHYSICAL THERAPY | Facility: MEDICAL CENTER | Age: 30
End: 2020-11-25
Payer: COMMERCIAL

## 2020-11-25 DIAGNOSIS — S52.502A CLOSED FRACTURE OF DISTAL END OF LEFT RADIUS, UNSPECIFIED FRACTURE MORPHOLOGY, INITIAL ENCOUNTER: ICD-10-CM

## 2020-11-25 DIAGNOSIS — S62.101A CLOSED FRACTURE DISLOCATION OF RIGHT WRIST, INITIAL ENCOUNTER: Primary | ICD-10-CM

## 2020-11-25 DIAGNOSIS — Z47.89 ORTHOPEDIC AFTERCARE: ICD-10-CM

## 2020-11-25 PROCEDURE — 97110 THERAPEUTIC EXERCISES: CPT | Performed by: PHYSICAL THERAPIST

## 2020-11-25 PROCEDURE — 97140 MANUAL THERAPY 1/> REGIONS: CPT | Performed by: PHYSICAL THERAPIST

## 2020-12-03 ENCOUNTER — OFFICE VISIT (OUTPATIENT)
Dept: PHYSICAL THERAPY | Facility: MEDICAL CENTER | Age: 30
End: 2020-12-03
Payer: COMMERCIAL

## 2020-12-03 DIAGNOSIS — Z47.89 ORTHOPEDIC AFTERCARE: ICD-10-CM

## 2020-12-03 DIAGNOSIS — S62.101A CLOSED FRACTURE DISLOCATION OF RIGHT WRIST, INITIAL ENCOUNTER: Primary | ICD-10-CM

## 2020-12-03 DIAGNOSIS — S52.502A CLOSED FRACTURE OF DISTAL END OF LEFT RADIUS, UNSPECIFIED FRACTURE MORPHOLOGY, INITIAL ENCOUNTER: ICD-10-CM

## 2020-12-03 PROCEDURE — 97140 MANUAL THERAPY 1/> REGIONS: CPT | Performed by: PHYSICAL THERAPIST

## 2020-12-03 PROCEDURE — 97110 THERAPEUTIC EXERCISES: CPT | Performed by: PHYSICAL THERAPIST

## 2020-12-04 ENCOUNTER — OFFICE VISIT (OUTPATIENT)
Dept: OBGYN CLINIC | Facility: HOSPITAL | Age: 30
End: 2020-12-04
Payer: COMMERCIAL

## 2020-12-04 ENCOUNTER — HOSPITAL ENCOUNTER (OUTPATIENT)
Dept: RADIOLOGY | Facility: HOSPITAL | Age: 30
Discharge: HOME | End: 2020-12-04
Attending: ORTHOPAEDIC SURGERY
Payer: COMMERCIAL

## 2020-12-04 VITALS
HEART RATE: 74 BPM | WEIGHT: 172 LBS | DIASTOLIC BLOOD PRESSURE: 87 MMHG | BODY MASS INDEX: 27 KG/M2 | HEIGHT: 67 IN | SYSTOLIC BLOOD PRESSURE: 139 MMHG

## 2020-12-04 DIAGNOSIS — S52.502A CLOSED FRACTURE OF DISTAL END OF LEFT RADIUS, UNSPECIFIED FRACTURE MORPHOLOGY, INITIAL ENCOUNTER: ICD-10-CM

## 2020-12-04 DIAGNOSIS — S62.101A CLOSED FRACTURE DISLOCATION OF RIGHT WRIST, INITIAL ENCOUNTER: Primary | ICD-10-CM

## 2020-12-04 DIAGNOSIS — S62.101A CLOSED FRACTURE DISLOCATION OF RIGHT WRIST, INITIAL ENCOUNTER: ICD-10-CM

## 2020-12-04 PROCEDURE — 73110 X-RAY EXAM OF WRIST: CPT

## 2020-12-04 PROCEDURE — 99213 OFFICE O/P EST LOW 20 MIN: CPT | Performed by: ORTHOPAEDIC SURGERY

## 2020-12-04 RX ORDER — ACETAMINOPHEN 325 MG/1
650 TABLET ORAL EVERY 6 HOURS PRN
COMMUNITY
End: 2021-11-10 | Stop reason: ALTCHOICE

## 2020-12-08 ENCOUNTER — OFFICE VISIT (OUTPATIENT)
Dept: PHYSICAL THERAPY | Facility: MEDICAL CENTER | Age: 30
End: 2020-12-08
Payer: COMMERCIAL

## 2020-12-08 DIAGNOSIS — Z47.89 ORTHOPEDIC AFTERCARE: ICD-10-CM

## 2020-12-08 DIAGNOSIS — S52.502A CLOSED FRACTURE OF DISTAL END OF LEFT RADIUS, UNSPECIFIED FRACTURE MORPHOLOGY, INITIAL ENCOUNTER: ICD-10-CM

## 2020-12-08 DIAGNOSIS — S62.101A CLOSED FRACTURE DISLOCATION OF RIGHT WRIST, INITIAL ENCOUNTER: Primary | ICD-10-CM

## 2020-12-08 PROCEDURE — 97110 THERAPEUTIC EXERCISES: CPT | Performed by: PHYSICAL THERAPIST

## 2020-12-08 PROCEDURE — 97140 MANUAL THERAPY 1/> REGIONS: CPT | Performed by: PHYSICAL THERAPIST

## 2020-12-10 ENCOUNTER — OFFICE VISIT (OUTPATIENT)
Dept: PHYSICAL THERAPY | Facility: MEDICAL CENTER | Age: 30
End: 2020-12-10
Payer: COMMERCIAL

## 2020-12-10 ENCOUNTER — OFFICE VISIT (OUTPATIENT)
Dept: NEUROSURGERY | Facility: CLINIC | Age: 30
End: 2020-12-10
Payer: COMMERCIAL

## 2020-12-10 ENCOUNTER — HOSPITAL ENCOUNTER (OUTPATIENT)
Dept: RADIOLOGY | Facility: HOSPITAL | Age: 30
Discharge: HOME | End: 2020-12-10
Payer: COMMERCIAL

## 2020-12-10 VITALS
HEIGHT: 67 IN | SYSTOLIC BLOOD PRESSURE: 142 MMHG | HEART RATE: 78 BPM | WEIGHT: 173 LBS | TEMPERATURE: 98.2 F | BODY MASS INDEX: 27.15 KG/M2 | DIASTOLIC BLOOD PRESSURE: 92 MMHG | RESPIRATION RATE: 16 BRPM

## 2020-12-10 DIAGNOSIS — S12.121D OTHER CLOSED NONDISPLACED ODONTOID FRACTURE WITH ROUTINE HEALING, SUBSEQUENT ENCOUNTER: ICD-10-CM

## 2020-12-10 DIAGNOSIS — S12.041D CLOSED NONDISPLACED LATERAL MASS FRACTURE OF FIRST CERVICAL VERTEBRA WITH ROUTINE HEALING, SUBSEQUENT ENCOUNTER: ICD-10-CM

## 2020-12-10 DIAGNOSIS — S52.502A CLOSED FRACTURE OF DISTAL END OF LEFT RADIUS, UNSPECIFIED FRACTURE MORPHOLOGY, INITIAL ENCOUNTER: ICD-10-CM

## 2020-12-10 DIAGNOSIS — Z47.89 ORTHOPEDIC AFTERCARE: ICD-10-CM

## 2020-12-10 DIAGNOSIS — S12.041D CLOSED NONDISPLACED LATERAL MASS FRACTURE OF FIRST CERVICAL VERTEBRA WITH ROUTINE HEALING, SUBSEQUENT ENCOUNTER: Primary | ICD-10-CM

## 2020-12-10 DIAGNOSIS — S62.101A CLOSED FRACTURE DISLOCATION OF RIGHT WRIST, INITIAL ENCOUNTER: Primary | ICD-10-CM

## 2020-12-10 PROCEDURE — 72050 X-RAY EXAM NECK SPINE 4/5VWS: CPT

## 2020-12-10 PROCEDURE — 97140 MANUAL THERAPY 1/> REGIONS: CPT | Performed by: PHYSICAL THERAPIST

## 2020-12-10 PROCEDURE — 99213 OFFICE O/P EST LOW 20 MIN: CPT | Performed by: PHYSICIAN ASSISTANT

## 2020-12-10 PROCEDURE — 97110 THERAPEUTIC EXERCISES: CPT | Performed by: PHYSICAL THERAPIST

## 2020-12-10 PROCEDURE — 1036F TOBACCO NON-USER: CPT | Performed by: PHYSICIAN ASSISTANT

## 2020-12-15 ENCOUNTER — OFFICE VISIT (OUTPATIENT)
Dept: PHYSICAL THERAPY | Facility: MEDICAL CENTER | Age: 30
End: 2020-12-15
Payer: COMMERCIAL

## 2020-12-15 ENCOUNTER — APPOINTMENT (OUTPATIENT)
Dept: PHYSICAL THERAPY | Facility: MEDICAL CENTER | Age: 30
End: 2020-12-15
Payer: COMMERCIAL

## 2020-12-15 DIAGNOSIS — S52.502A CLOSED FRACTURE OF DISTAL END OF LEFT RADIUS, UNSPECIFIED FRACTURE MORPHOLOGY, INITIAL ENCOUNTER: ICD-10-CM

## 2020-12-15 DIAGNOSIS — Z47.89 ORTHOPEDIC AFTERCARE: ICD-10-CM

## 2020-12-15 DIAGNOSIS — S62.101A CLOSED FRACTURE DISLOCATION OF RIGHT WRIST, INITIAL ENCOUNTER: Primary | ICD-10-CM

## 2020-12-15 PROCEDURE — 97110 THERAPEUTIC EXERCISES: CPT | Performed by: PHYSICAL THERAPIST

## 2020-12-15 PROCEDURE — 97112 NEUROMUSCULAR REEDUCATION: CPT | Performed by: PHYSICAL THERAPIST

## 2020-12-16 ENCOUNTER — HOSPITAL ENCOUNTER (OUTPATIENT)
Dept: CT IMAGING | Facility: HOSPITAL | Age: 30
Discharge: HOME | End: 2020-12-16
Payer: COMMERCIAL

## 2020-12-16 DIAGNOSIS — S12.041D CLOSED NONDISPLACED LATERAL MASS FRACTURE OF FIRST CERVICAL VERTEBRA WITH ROUTINE HEALING, SUBSEQUENT ENCOUNTER: ICD-10-CM

## 2020-12-16 DIAGNOSIS — S12.121D OTHER CLOSED NONDISPLACED ODONTOID FRACTURE WITH ROUTINE HEALING, SUBSEQUENT ENCOUNTER: ICD-10-CM

## 2020-12-16 PROCEDURE — 72125 CT NECK SPINE W/O DYE: CPT

## 2020-12-16 PROCEDURE — G1004 CDSM NDSC: HCPCS

## 2020-12-17 ENCOUNTER — APPOINTMENT (OUTPATIENT)
Dept: PHYSICAL THERAPY | Facility: MEDICAL CENTER | Age: 30
End: 2020-12-17
Payer: COMMERCIAL

## 2020-12-18 ENCOUNTER — OFFICE VISIT (OUTPATIENT)
Dept: NEUROSURGERY | Facility: CLINIC | Age: 30
End: 2020-12-18
Payer: COMMERCIAL

## 2020-12-18 VITALS
TEMPERATURE: 98.6 F | HEIGHT: 67 IN | WEIGHT: 173 LBS | BODY MASS INDEX: 27.15 KG/M2 | DIASTOLIC BLOOD PRESSURE: 80 MMHG | SYSTOLIC BLOOD PRESSURE: 130 MMHG

## 2020-12-18 DIAGNOSIS — S12.041D CLOSED NONDISPLACED LATERAL MASS FRACTURE OF FIRST CERVICAL VERTEBRA WITH ROUTINE HEALING, SUBSEQUENT ENCOUNTER: ICD-10-CM

## 2020-12-18 DIAGNOSIS — S12.100K CLOSED ODONTOID FRACTURE WITH NONUNION: Primary | ICD-10-CM

## 2020-12-18 PROCEDURE — 3008F BODY MASS INDEX DOCD: CPT | Performed by: PHYSICIAN ASSISTANT

## 2020-12-18 PROCEDURE — 99215 OFFICE O/P EST HI 40 MIN: CPT | Performed by: NEUROLOGICAL SURGERY

## 2020-12-22 ENCOUNTER — OFFICE VISIT (OUTPATIENT)
Dept: PHYSICAL THERAPY | Facility: MEDICAL CENTER | Age: 30
End: 2020-12-22
Payer: COMMERCIAL

## 2020-12-22 DIAGNOSIS — Z47.89 ORTHOPEDIC AFTERCARE: ICD-10-CM

## 2020-12-22 DIAGNOSIS — S62.101A CLOSED FRACTURE DISLOCATION OF RIGHT WRIST, INITIAL ENCOUNTER: Primary | ICD-10-CM

## 2020-12-22 DIAGNOSIS — S52.502A CLOSED FRACTURE OF DISTAL END OF LEFT RADIUS, UNSPECIFIED FRACTURE MORPHOLOGY, INITIAL ENCOUNTER: ICD-10-CM

## 2020-12-22 PROCEDURE — 97530 THERAPEUTIC ACTIVITIES: CPT | Performed by: PHYSICAL THERAPIST

## 2020-12-22 PROCEDURE — 97140 MANUAL THERAPY 1/> REGIONS: CPT | Performed by: PHYSICAL THERAPIST

## 2020-12-22 PROCEDURE — 97110 THERAPEUTIC EXERCISES: CPT | Performed by: PHYSICAL THERAPIST

## 2020-12-24 ENCOUNTER — APPOINTMENT (OUTPATIENT)
Dept: PHYSICAL THERAPY | Facility: MEDICAL CENTER | Age: 30
End: 2020-12-24
Payer: COMMERCIAL

## 2020-12-29 ENCOUNTER — OFFICE VISIT (OUTPATIENT)
Dept: PHYSICAL THERAPY | Facility: MEDICAL CENTER | Age: 30
End: 2020-12-29
Payer: COMMERCIAL

## 2020-12-29 DIAGNOSIS — S52.502A CLOSED FRACTURE OF DISTAL END OF LEFT RADIUS, UNSPECIFIED FRACTURE MORPHOLOGY, INITIAL ENCOUNTER: ICD-10-CM

## 2020-12-29 DIAGNOSIS — S62.101A CLOSED FRACTURE DISLOCATION OF RIGHT WRIST, INITIAL ENCOUNTER: Primary | ICD-10-CM

## 2020-12-29 DIAGNOSIS — Z47.89 ORTHOPEDIC AFTERCARE: ICD-10-CM

## 2020-12-29 PROCEDURE — 97530 THERAPEUTIC ACTIVITIES: CPT | Performed by: PHYSICAL THERAPIST

## 2020-12-29 PROCEDURE — 97140 MANUAL THERAPY 1/> REGIONS: CPT | Performed by: PHYSICAL THERAPIST

## 2020-12-29 PROCEDURE — 97110 THERAPEUTIC EXERCISES: CPT | Performed by: PHYSICAL THERAPIST

## 2020-12-31 ENCOUNTER — OFFICE VISIT (OUTPATIENT)
Dept: PHYSICAL THERAPY | Facility: MEDICAL CENTER | Age: 30
End: 2020-12-31
Payer: COMMERCIAL

## 2020-12-31 DIAGNOSIS — S62.101A CLOSED FRACTURE DISLOCATION OF RIGHT WRIST, INITIAL ENCOUNTER: Primary | ICD-10-CM

## 2020-12-31 DIAGNOSIS — Z47.89 ORTHOPEDIC AFTERCARE: ICD-10-CM

## 2020-12-31 DIAGNOSIS — S52.502A CLOSED FRACTURE OF DISTAL END OF LEFT RADIUS, UNSPECIFIED FRACTURE MORPHOLOGY, INITIAL ENCOUNTER: ICD-10-CM

## 2020-12-31 PROCEDURE — 97110 THERAPEUTIC EXERCISES: CPT | Performed by: PHYSICAL THERAPIST

## 2020-12-31 PROCEDURE — 97140 MANUAL THERAPY 1/> REGIONS: CPT | Performed by: PHYSICAL THERAPIST

## 2020-12-31 PROCEDURE — 97530 THERAPEUTIC ACTIVITIES: CPT | Performed by: PHYSICAL THERAPIST

## 2021-01-04 ENCOUNTER — HOSPITAL ENCOUNTER (OUTPATIENT)
Dept: RADIOLOGY | Facility: HOSPITAL | Age: 31
Discharge: HOME | End: 2021-01-04
Attending: NEUROLOGICAL SURGERY
Payer: COMMERCIAL

## 2021-01-04 ENCOUNTER — OFFICE VISIT (OUTPATIENT)
Dept: NEUROSURGERY | Facility: CLINIC | Age: 31
End: 2021-01-04
Payer: COMMERCIAL

## 2021-01-04 VITALS
HEIGHT: 67 IN | SYSTOLIC BLOOD PRESSURE: 149 MMHG | RESPIRATION RATE: 16 BRPM | BODY MASS INDEX: 27.15 KG/M2 | DIASTOLIC BLOOD PRESSURE: 100 MMHG | HEART RATE: 63 BPM | WEIGHT: 173 LBS | TEMPERATURE: 97.2 F

## 2021-01-04 DIAGNOSIS — S12.100K CLOSED ODONTOID FRACTURE WITH NONUNION: ICD-10-CM

## 2021-01-04 DIAGNOSIS — S12.100K CLOSED ODONTOID FRACTURE WITH NONUNION: Primary | ICD-10-CM

## 2021-01-04 DIAGNOSIS — S12.041D CLOSED NONDISPLACED LATERAL MASS FRACTURE OF FIRST CERVICAL VERTEBRA WITH ROUTINE HEALING, SUBSEQUENT ENCOUNTER: ICD-10-CM

## 2021-01-04 PROCEDURE — 72052 X-RAY EXAM NECK SPINE 6/>VWS: CPT

## 2021-01-04 PROCEDURE — 99213 OFFICE O/P EST LOW 20 MIN: CPT | Performed by: NEUROLOGICAL SURGERY

## 2021-01-04 NOTE — PROGRESS NOTES
Neurosurgery Office Note  Javed Coleman 31 y o  male MRN: [id number]      Assessment/Plan      Diagnoses and all orders for this visit:    Closed odontoid fracture with nonunion        Discussion:    69-year-old male now several months status post [facility], with multiple injuries, but presently addressing C1, C2 fractures  He has a C2 dens fracture, type 2, & this did not fully heal on CT scan, showing some range of motion/displacement on flexion-extension films  On coronal CT view, it is also displaced slightly to the right, causing malalignment C1 on C2 by a mm or so  Symptomatically, he mentions some neck pain on head rotation, moderate  He mentions some tingling in his arms, fingers on aggressive neck flexion  No weakness, no leg symptoms, no Lhermitte's pain, no bowel bladder issues  He was seen recently by our AP, as well as Dr Jia Vick, and recommended for surgery because of this instability  I reviewed with him the results of his imaging, including new x-rays, flexion-extension cervical spine, done today  I reviewed the risks and benefits of conservative i e  non operative versus operative care with this patient  His fracture pain is improving, although he does have malalignment, and some instability  Gold standard would be surgical fixation to prevent chronic progressive versus acute catastrophic neurologic compromise  Conversely, conservative care, as he is improving, is not completely unreasonable  The patient is quite firm in his resolution to remain with conservative care, in particular because fixation may limit his head rotation, which he needs for his occupation as a   If the patient is adamant on this course, I recommend he contact us with any progressive or worsening numbness into his arms, legs, gait dysfunction, bowel bladder issues, Lhermitte's shooting pain, etcetera  Otherwise we can see the patient back on an as-needed basis          01/04/21 Metrics: [id number]; VAS 85; ECOG 0, KPS [id number] 17/17      CHIEF COMPLAINT    Chief Complaint   Patient presents with    Follow-up     2ND OPINION FROM DR BELLAMY FOR ODONTOID FX       HISTORY    History of Present Illness     31y o  year old male     HPI    See Discussion    REVIEW OF SYSTEMS    Review of Systems   Constitutional: Negative  HENT: Positive for hearing loss (left ear) and tinnitus (Left ear )  Eyes: Negative  Respiratory: Negative  Cardiovascular: Negative  Gastrointestinal: Negative  Endocrine: Negative  Genitourinary: Negative  Musculoskeletal: Positive for arthralgias (Right shoulder but it depends what side he sleeps on  ), back pain (mid back pain ) and neck pain (Moving neck to the side or looking up and down has pain)  Skin: Negative  Allergic/Immunologic: Negative  Neurological: Negative for weakness and numbness  Tingling in b/l arms/hands   Hematological: Negative  Psychiatric/Behavioral: Negative  All other systems reviewed and are negative  Meds/Allergies     Current Outpatient Medications   Medication Sig Dispense Refill    acetaminophen (TYLENOL) 325 mg tablet Take 650 mg by mouth every 6 (six) hours as needed for mild pain      diltiazem (CARDIZEM CD) 240 mg 24 hr capsule Take 1 capsule (240 mg total) by mouth daily 90 capsule 3    nitroglycerin (NITROSTAT) 0 4 mg SL tablet Place 1 tablet (0 4 mg total) under the tongue every 5 (five) minutes as needed for chest pain 30 tablet 5     No current facility-administered medications for this visit          Allergies   Allergen Reactions    No Known Allergies        PAST HISTORY    Past Medical History:   Diagnosis Date    CKD (chronic kidney disease) stage 2, GFR 60-89 ml/min     History of coronary vasospasm     Hyperglycemia     Hyperlipidemia     Subarachnoid hemorrhage ([facility] [address] ) 2020       Past Surgical History:   Procedure Laterality Date    ORIF WRIST FRACTURE Bilateral 9/1/2020 Procedure: Open reduction internal fixation right two-part intra-articular distal radius fracture  Open reduction internal fixation left fracture dislocation distal radius  Application bilateral short-arm splints ;  Surgeon: Catie Shell MD;  [facility];  Service: Orthopedics       Social History     Tobacco Use    Smoking status: Never Smoker    Smokeless tobacco: Never Used   Substance Use Topics    Alcohol use: Yes     Alcohol/week: 8 0 standard drinks     Types: 1 Glasses of wine, 5 Cans of beer, 2 Shots of liquor per week     Frequency: 2-3 times a week     Drinks per session: 1 or 2     Comment: Drinking casually on weekends; not a daily drinker    Drug use: Never       Family History   Problem Relation Age of Onset    Hypertension Mother     Hyperlipidemia Mother     Hypertension Father     Hyperlipidemia Father          The following portions of the patient's history were reviewed in this encounter and updated as appropriate: Past medical, surgical, family, and social history, as well as medications, allergies, and review of systems  EXAM    Vitals:Blood pressure 149/100, pulse 63, temperature (!) 97 2 °F (36 2 °C), temperature source Tympanic, resp  rate 16, height 5' 7" (1 702 m), weight 78 5 kg (173 lb)  ,Body mass index is 27 1 kg/m²  Physical Exam    Neurologic Exam      MEDICAL DECISION MAKING    Imaging Studies:     Xr Spine Cervical Complete 4 Or 5 Vw Non Injury    Result Date: 12/13/2020  Narrative: CERVICAL SPINE INDICATION:   S12 041D: Nondisplaced lateral mass fracture of first cervical vertebra, subsequent encounter for fracture with routine healing S12 121D: Other nondisplaced dens fracture, subsequent encounter for fracture with routine healing   COMPARISON:  11/4/2020 VIEWS:  XR SPINE CERVICAL COMPLETE 4 OR 5 VW NON INJURY FINDINGS: Similar appearance of C1 right lateral mass fracture with step-off measuring approximately 1 mm and right lateral subluxation relative to C2  Minimal increased sclerosis at the base of the dens consistent with nondisplaced fracture as reported on prior CT examination Normal alignment without subluxation  Mild degenerative disc disease at C4-C5 and C5-C6  The neuroforamina are patent  The prevertebral soft tissues are within normal limits  The lung apices are clear  Impression: C1 right lateral mass fracture unchanged in alignment and appearance comparing to 11/4/2020 with right lateral subluxation of C1 relative to C2  Minimal increased sclerosis at the base of the dens consistent with nondisplaced fracture as reported on prior CT examination Workstation performed: [id number]     Ct Cervical Spine Without Contrast    Result Date: 12/18/2020  Narrative: CT CERVICAL SPINE - WITHOUT CONTRAST INDICATION:   S12 041D: Nondisplaced lateral mass fracture of first cervical vertebra, subsequent encounter for fracture with routine healing S12 121D: Other nondisplaced dens fracture, subsequent encounter for fracture with routine healing  COMPARISON:  CT 8/30/2020 TECHNIQUE:  CT examination of the cervical spine was performed without intravenous contrast   Contiguous axial images were obtained  Sagittal and coronal reconstructions were performed  Radiation dose length product (DLP) for this visit:  800 mGy-cm   This examination, like all CT scans performed in the [facility], was performed utilizing techniques to minimize radiation dose exposure, including the use of iterative reconstruction and automated exposure control  IMAGE QUALITY:  Diagnostic  FINDINGS: ALIGNMENT:  C-spine ascends to the left in a coronal plane  Arch of C1 is intact, minor deformity of the anterior inferior right C1 lateral mass  Slight rightward translation of the lateral masses of C1 referable to C2  The type II odontoid fracture is reidentified without solid bony fusion    Odontoid shifted slightly to the right and 3-4 mm anteriorly referable to the body of C2  VERTEBRAL BODIES:  No acute fracture DEGENERATIVE CHANGES:  No significant cervical degenerative changes are noted  PREVERTEBRAL AND PARASPINAL SOFT TISSUES:  Unremarkable  THORACIC INLET:  Normal      Impression: No bony fusion of the known type II odontoid fracture  The odontoid and ring of C1 are displaced slightly anteriorly, and to the right, in reference to the body of C2  Workstation performed: [id number]     C-spine flexion-extension x-rays 1/4/21 completed but not yet read  I have personally reviewed pertinent reports     and I have personally reviewed pertinent films in PACS

## 2021-01-11 ENCOUNTER — TELEPHONE (OUTPATIENT)
Dept: NEPHROLOGY | Facility: CLINIC | Age: 31
End: 2021-01-11

## 2021-01-11 NOTE — TELEPHONE ENCOUNTER
I spoke to the patient and reminded him of his appointment with Dr Sho Dimas on 1/14/21, and there us a blood test to be done for the appointment  The patient stated he will go to [place] [id number] to have the blood work done

## 2021-01-13 ENCOUNTER — LAB (OUTPATIENT)
Dept: LAB | Facility: HOSPITAL | Age: 31
End: 2021-01-13
Attending: INTERNAL MEDICINE
Payer: COMMERCIAL

## 2021-01-14 ENCOUNTER — OFFICE VISIT (OUTPATIENT)
Dept: NEPHROLOGY | Facility: CLINIC | Age: 31
End: 2021-01-14
Payer: COMMERCIAL

## 2021-01-14 VITALS
WEIGHT: 171 LBS | DIASTOLIC BLOOD PRESSURE: 86 MMHG | TEMPERATURE: 97.8 F | HEART RATE: 65 BPM | BODY MASS INDEX: 26.84 KG/M2 | RESPIRATION RATE: 16 BRPM | HEIGHT: 67 IN | SYSTOLIC BLOOD PRESSURE: 138 MMHG

## 2021-01-14 DIAGNOSIS — I10 ESSENTIAL HYPERTENSION: ICD-10-CM

## 2021-01-14 DIAGNOSIS — E87.1 HYPONATREMIA: Primary | ICD-10-CM

## 2021-01-14 PROBLEM — N18.2 STAGE 2 CHRONIC KIDNEY DISEASE: Status: RESOLVED | Noted: 2019-08-07 | Resolved: 2021-01-14

## 2021-01-14 PROCEDURE — 99213 OFFICE O/P EST LOW 20 MIN: CPT | Performed by: INTERNAL MEDICINE

## 2021-01-14 PROCEDURE — 3008F BODY MASS INDEX DOCD: CPT | Performed by: INTERNAL MEDICINE

## 2021-01-14 PROCEDURE — 3075F SYST BP GE 130 - 139MM HG: CPT | Performed by: INTERNAL MEDICINE

## 2021-01-14 PROCEDURE — 1036F TOBACCO NON-USER: CPT | Performed by: INTERNAL MEDICINE

## 2021-01-14 PROCEDURE — 3079F DIAST BP 80-89 MM HG: CPT | Performed by: INTERNAL MEDICINE

## 2021-01-14 RX ORDER — IBUPROFEN 200 MG
200 TABLET ORAL
COMMUNITY
End: 2021-11-10 | Stop reason: ALTCHOICE
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
## 2021-01-14 NOTE — PATIENT INSTRUCTIONS
- Please call me in 10 days after having your blood work done to review the results if you do not hear back from me or my office, as I may have not received the results  - please remember to perform blood work prior to the next visit  - Please call if the blood pressure top number is greater than 150 or less than 110 consistently  - Please call if you are gaining more than 2lbs in 2 days for adjustment of water pills   ~ Please AVOID the following pain medications  LIST OF NSAIDS (NONSTEROIDAL ANTI-INFLAMMATORY DRUGS) AND RUTLEDGE-2 INHIBITORS    DIFLUNISAL (DOLOBID)  IBUPROFEN (MOTRIN, ADVIL)  FLURBIPROFEN (ANSAID)  KETOPROFEN (ORUDIS, ORUVAIL)  FENOPROFEN (NALFON)  NABUMETONE (RELAFEN)  PIROXICAM (FELDENE)  NAPROXEN (ALEVE, NAPROSYN, NAPRELAN, ANAPROX)  DICLOFENAC (VOLTAREN, CATAFLAM)  INDOMETHACIN (INDOCIN)  SULINDAC (CLINORIL)  TOLMETIN (TOLETIN)  ETODOLAC (LODINE)  MELOXICAM (MOBIC)  KETOROLAC (TORADOL)  OXAPROZIN (DAYPRO)  CELECOXIB (CELEBREX)    Things to do to reduce your blood pressure include working with all your physician to do the following:  ~ stop smoking if you smoke  ~ increase cardiovascular exercise like walking and swimming    ~ modify your diet to decrease fat and salt intake  ~ reduce your weight if you are overweight or obese   ~ increase the consumption of fruits, vegetables and whole grains  ~ decrease alcohol consumption if you consume alcohol    ~ try to minimize stress in your life with lifestyle modifications  ~ be compliant with your anti-hypertensive medications  ~ adjust your medications to help improve your vascular stiffness and decrease risks for heart attacks and strokes

## 2021-04-18 ENCOUNTER — IMMUNIZATIONS (OUTPATIENT)
Dept: FAMILY MEDICINE CLINIC | Facility: HOSPITAL | Age: 31
End: 2021-04-18

## 2021-04-18 DIAGNOSIS — Z23 ENCOUNTER FOR IMMUNIZATION: Primary | ICD-10-CM

## 2021-04-18 PROCEDURE — 0001A SARS-COV-2 / COVID-19 MRNA VACCINE (PFIZER-BIONTECH) 30 MCG: CPT

## 2021-04-18 PROCEDURE — 91300 SARS-COV-2 / COVID-19 MRNA VACCINE (PFIZER-BIONTECH) 30 MCG: CPT

## 2021-04-27 ENCOUNTER — OFFICE VISIT (OUTPATIENT)
Dept: FAMILY MEDICINE CLINIC | Facility: CLINIC | Age: 31
End: 2021-04-27
Payer: COMMERCIAL

## 2021-04-27 VITALS
WEIGHT: 173 LBS | TEMPERATURE: 97.9 F | RESPIRATION RATE: 16 BRPM | DIASTOLIC BLOOD PRESSURE: 86 MMHG | SYSTOLIC BLOOD PRESSURE: 126 MMHG | BODY MASS INDEX: 27.15 KG/M2 | HEART RATE: 72 BPM | HEIGHT: 67 IN | OXYGEN SATURATION: 98 %

## 2021-04-27 DIAGNOSIS — Z00.01 ENCOUNTER FOR GENERAL ADULT MEDICAL EXAMINATION WITH ABNORMAL FINDINGS: Primary | ICD-10-CM

## 2021-04-27 PROCEDURE — 99395 PREV VISIT EST AGE 18-39: CPT | Performed by: FAMILY MEDICINE

## 2021-04-27 PROCEDURE — 3725F SCREEN DEPRESSION PERFORMED: CPT | Performed by: FAMILY MEDICINE

## 2021-04-27 PROCEDURE — 3008F BODY MASS INDEX DOCD: CPT | Performed by: FAMILY MEDICINE

## 2021-04-27 PROCEDURE — 3079F DIAST BP 80-89 MM HG: CPT | Performed by: FAMILY MEDICINE

## 2021-04-27 PROCEDURE — 1036F TOBACCO NON-USER: CPT | Performed by: FAMILY MEDICINE

## 2021-04-27 PROCEDURE — 3074F SYST BP LT 130 MM HG: CPT | Performed by: FAMILY MEDICINE

## 2021-04-27 NOTE — PROGRESS NOTES
Assessment/Plan:  Patient is here for physical exam  I found patient without any distress  Patient has no chronic conditions  I  Recommended him he to  exercise at least 3 1/2  hours per week and maintain a healthy diet with low carbohydrate and low saturated fat  Information about to search for healthy diet and exercise explained to patient  Patient understands the need for an annual physical exam       No problem-specific Assessment & Plan notes found for this encounter  Diagnoses and all orders for this visit:    Encounter for general adult medical examination with abnormal findings  -     Lipid panel; Future    BMI 27 0-27 9,adult          Subjective:      Patient ID: Shaquille Bradshaw is a 31 y o  male  Patient is here for PE, not having any acute distress  He is a  self employed  He has to kids [de-identified] year old all boys in 3year-old girl  He does not smoke or drink alcohol  He does not use illegal drugs  The following portions of the patient's history were reviewed and updated as appropriate: allergies, current medications, past family history, past medical history, past social history, past surgical history and problem list     Review of Systems   Constitutional: Negative for diaphoresis, fatigue, fever and unexpected weight change  Respiratory: Negative for apnea, cough, choking, chest tightness and shortness of breath  Cardiovascular: Negative for chest pain, palpitations and leg swelling  Gastrointestinal: Negative for abdominal distention, abdominal pain, anal bleeding, blood in stool and constipation  Musculoskeletal: Negative for arthralgias, back pain, gait problem and joint swelling  Neurological: Negative for dizziness, facial asymmetry, light-headedness and headaches  Psychiatric/Behavioral: Negative for behavioral problems, dysphoric mood and self-injury  The patient is not nervous/anxious            Objective:      /86 (BP Location: Left arm, Patient Position: Sitting, Cuff Size: Standard)   Pulse 72   Temp 97 9 °F (36 6 °C) (Temporal)   Resp 16   Ht 5' 7" (1 702 m)   Wt 78 5 kg (173 lb)   SpO2 98%   BMI 27 10 kg/m²          Physical Exam  Vitals signs and nursing note reviewed  Neck:      Musculoskeletal: No edema or neck rigidity  Thyroid: No thyroid mass or thyromegaly  Vascular: No carotid bruit or JVD  Trachea: No tracheal tenderness  Cardiovascular:      Rate and Rhythm: Normal rate and regular rhythm  No extrasystoles are present  Pulses: Normal pulses  Heart sounds: Normal heart sounds  Heart sounds not distant  No friction rub  Pulmonary:      Effort: Pulmonary effort is normal  No tachypnea or bradypnea  Breath sounds: Normal breath sounds  No stridor  Abdominal:      General: Bowel sounds are normal  There is no abdominal bruit  Palpations: Abdomen is soft  There is no hepatomegaly or splenomegaly  Hernia: No hernia is present  Musculoskeletal: Normal range of motion  Skin:     General: Skin is warm and dry  Neurological:      Mental Status: He is oriented to person, place, and time  Deep Tendon Reflexes: Reflexes are normal and symmetric  Psychiatric:         Behavior: Behavior normal          Thought Content: Thought content normal          Judgment: Judgment normal          BMI Counseling: Body mass index is 27 1 kg/m²  The BMI is above normal  Nutrition recommendations include 3-5 servings of fruits/vegetables daily

## 2021-05-13 ENCOUNTER — IMMUNIZATIONS (OUTPATIENT)
Dept: FAMILY MEDICINE CLINIC | Facility: HOSPITAL | Age: 31
End: 2021-05-13

## 2021-05-13 DIAGNOSIS — Z23 ENCOUNTER FOR IMMUNIZATION: Primary | ICD-10-CM

## 2021-05-13 PROCEDURE — 0002A SARS-COV-2 / COVID-19 MRNA VACCINE (PFIZER-BIONTECH) 30 MCG: CPT

## 2021-05-13 PROCEDURE — 91300 SARS-COV-2 / COVID-19 MRNA VACCINE (PFIZER-BIONTECH) 30 MCG: CPT

## 2021-06-01 ENCOUNTER — LAB (OUTPATIENT)
Dept: LAB | Facility: HOSPITAL | Age: 31
End: 2021-06-01
Payer: COMMERCIAL

## 2021-06-01 DIAGNOSIS — Z00.01 ENCOUNTER FOR GENERAL ADULT MEDICAL EXAMINATION WITH ABNORMAL FINDINGS: ICD-10-CM

## 2021-06-01 LAB
CHOLEST SERPL-MCNC: 233 MG/DL (ref 50–200)
HDLC SERPL-MCNC: 41 MG/DL
LDLC SERPL CALC-MCNC: 146 MG/DL (ref 0–100)
NONHDLC SERPL-MCNC: 192 MG/DL
TRIGL SERPL-MCNC: 231 MG/DL

## 2021-06-01 PROCEDURE — 80061 LIPID PANEL: CPT

## 2021-06-01 PROCEDURE — 36415 COLL VENOUS BLD VENIPUNCTURE: CPT

## 2021-06-03 DIAGNOSIS — E78.2 MIXED HYPERLIPIDEMIA: Primary | ICD-10-CM

## 2021-06-03 RX ORDER — ATORVASTATIN CALCIUM 40 MG/1
40 TABLET, FILM COATED ORAL DAILY
Qty: 90 TABLET | Refills: 3 | Status: SHIPPED | OUTPATIENT
Start: 2021-06-03 | End: 2021-12-22 | Stop reason: SDUPTHER

## 2021-06-03 NOTE — RESULT ENCOUNTER NOTE
Please call patient labs reported hyperlipidemia,  Very similar than one year ago, the risk of stroke coronary artery disease is higher, he needs to take a cholesterol lowering medication of name atorvastatin a dose of 40 milligrams once a day  He can take this medication any time during the day  We may repeat cholesterol levels in three month

## 2021-08-24 ENCOUNTER — TELEPHONE (OUTPATIENT)
Dept: FAMILY MEDICINE CLINIC | Facility: CLINIC | Age: 31
End: 2021-08-24
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
## 2021-08-24 NOTE — TELEPHONE ENCOUNTER
PT informed he stated neurology gave him a referral advised him I dont see one he will have to contact neurology if he doesn't want to set appt

## 2021-11-10 ENCOUNTER — OFFICE VISIT (OUTPATIENT)
Dept: FAMILY MEDICINE CLINIC | Facility: CLINIC | Age: 31
End: 2021-11-10
Payer: COMMERCIAL

## 2021-11-10 VITALS
HEIGHT: 67 IN | TEMPERATURE: 97.9 F | WEIGHT: 177 LBS | SYSTOLIC BLOOD PRESSURE: 138 MMHG | DIASTOLIC BLOOD PRESSURE: 80 MMHG | OXYGEN SATURATION: 98 % | BODY MASS INDEX: 27.78 KG/M2 | HEART RATE: 75 BPM | RESPIRATION RATE: 16 BRPM

## 2021-11-10 DIAGNOSIS — Z11.59 ENCOUNTER FOR HEPATITIS C SCREENING TEST FOR LOW RISK PATIENT: ICD-10-CM

## 2021-11-10 DIAGNOSIS — D17.0 LIPOMA OF NECK: Primary | ICD-10-CM

## 2021-11-10 DIAGNOSIS — E78.2 MIXED HYPERLIPIDEMIA: ICD-10-CM

## 2021-11-10 DIAGNOSIS — R73.9 HYPERGLYCEMIA: ICD-10-CM

## 2021-11-10 PROBLEM — E87.1 HYPONATREMIA: Status: RESOLVED | Noted: 2020-09-05 | Resolved: 2021-11-10

## 2021-11-10 PROBLEM — S06.0XAA CONCUSSION: Status: RESOLVED | Noted: 2020-08-30 | Resolved: 2021-11-10

## 2021-11-10 PROBLEM — R52 PAIN PROVOKED BY TRAUMA: Status: RESOLVED | Noted: 2020-09-05 | Resolved: 2021-11-10

## 2021-11-10 PROBLEM — Z76.89 ENCOUNTER FOR SUPPORT AND COORDINATION OF TRANSITION OF CARE: Status: RESOLVED | Noted: 2020-09-24 | Resolved: 2021-11-10

## 2021-11-10 PROBLEM — S12.041A CLOSED NONDISPLACED LATERAL MASS FRACTURE OF FIRST CERVICAL VERTEBRA (HCC): Status: RESOLVED | Noted: 2020-08-30 | Resolved: 2021-11-10

## 2021-11-10 PROBLEM — T07.XXXA ABRASIONS OF MULTIPLE SITES: Status: RESOLVED | Noted: 2020-08-30 | Resolved: 2021-11-10

## 2021-11-10 PROBLEM — S52.502A CLOSED FRACTURE OF DISTAL END OF LEFT RADIUS: Status: RESOLVED | Noted: 2020-08-30 | Resolved: 2021-11-10

## 2021-11-10 PROBLEM — S62.101A: Status: RESOLVED | Noted: 2020-08-30 | Resolved: 2021-11-10

## 2021-11-10 PROBLEM — R79.89 TROPONIN I ABOVE REFERENCE RANGE: Status: RESOLVED | Noted: 2019-08-08 | Resolved: 2021-11-10

## 2021-11-10 PROBLEM — I60.9 SAH (SUBARACHNOID HEMORRHAGE) (HCC): Status: RESOLVED | Noted: 2020-08-30 | Resolved: 2021-11-10

## 2021-11-10 PROBLEM — S06.0X9A CONCUSSION: Status: RESOLVED | Noted: 2020-08-30 | Resolved: 2021-11-10

## 2021-11-10 PROBLEM — S12.041A CLOSED NONDISPLACED LATERAL MASS FRACTURE OF FIRST CERVICAL VERTEBRA (HCC): Status: RESOLVED | Noted: 2020-09-06 | Resolved: 2021-11-10

## 2021-11-10 PROBLEM — S12.100K CLOSED ODONTOID FRACTURE WITH NONUNION: Status: RESOLVED | Noted: 2020-12-18 | Resolved: 2021-11-10

## 2021-11-10 PROBLEM — M54.50 ACUTE MIDLINE LOW BACK PAIN WITHOUT SCIATICA: Status: RESOLVED | Noted: 2020-01-29 | Resolved: 2021-11-10

## 2021-11-10 PROBLEM — S02.85XA FRACTURE OF RIGHT ORBITAL WALL (HCC): Status: RESOLVED | Noted: 2020-08-30 | Resolved: 2021-11-10

## 2021-11-10 PROBLEM — M25.512 ACUTE PAIN OF LEFT SHOULDER: Status: RESOLVED | Noted: 2019-07-05 | Resolved: 2021-11-10
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
## 2021-11-10 PROBLEM — S02.19XA CLOSED FRACTURE OF TEMPORAL BONE (HCC): Status: RESOLVED | Noted: 2020-08-30 | Resolved: 2021-11-10

## 2021-11-10 PROBLEM — S00.83XA FACIAL CONTUSION, INITIAL ENCOUNTER: Status: RESOLVED | Noted: 2020-08-30 | Resolved: 2021-11-10

## 2021-11-10 PROBLEM — S32.009A FRACTURE OF LUMBAR SPINE (HCC): Status: RESOLVED | Noted: 2020-09-06 | Resolved: 2021-11-10

## 2021-11-10 PROBLEM — S06.4X9A TRAUMATIC EPIDURAL HEMATOMA (HCC): Status: RESOLVED | Noted: 2020-08-31 | Resolved: 2021-11-10

## 2021-11-10 PROBLEM — S06.4XAA TRAUMATIC EPIDURAL HEMATOMA (HCC): Status: RESOLVED | Noted: 2020-08-31 | Resolved: 2021-11-10

## 2021-11-10 PROBLEM — Z86.79 HISTORY OF SUBARACHNOID HEMORRHAGE: Status: RESOLVED | Noted: 2020-09-06 | Resolved: 2021-11-10

## 2021-11-10 PROBLEM — Z86.79 HISTORY OF CORONARY VASOSPASM: Status: RESOLVED | Noted: 2020-08-31 | Resolved: 2021-11-10

## 2021-11-10 PROBLEM — S69.90XA WRIST INJURY: Status: RESOLVED | Noted: 2020-09-06 | Resolved: 2021-11-10

## 2021-11-10 PROBLEM — F07.81 POST CONCUSSION SYNDROME: Status: RESOLVED | Noted: 2020-09-15 | Resolved: 2021-11-10

## 2021-11-10 PROBLEM — S32.009A CLOSED FRACTURE OF LUMBAR VERTEBRA (HCC): Status: RESOLVED | Noted: 2020-08-30 | Resolved: 2021-11-10

## 2021-11-10 PROBLEM — S12.101A CLOSED NONDISPLACED FRACTURE OF SECOND CERVICAL VERTEBRA (HCC): Status: RESOLVED | Noted: 2020-08-30 | Resolved: 2021-11-10

## 2021-11-10 PROBLEM — Z00.00 ANNUAL PHYSICAL EXAM: Status: RESOLVED | Noted: 2019-07-30 | Resolved: 2021-11-10

## 2021-11-10 PROBLEM — R77.8 TROPONIN I ABOVE REFERENCE RANGE: Status: RESOLVED | Noted: 2019-08-08 | Resolved: 2021-11-10

## 2021-11-10 PROBLEM — K59.03 DRUG-INDUCED CONSTIPATION: Status: RESOLVED | Noted: 2020-09-08 | Resolved: 2021-11-10

## 2021-11-10 PROBLEM — R07.9 CHEST PAIN: Status: RESOLVED | Noted: 2019-08-07 | Resolved: 2021-11-10

## 2021-11-10 PROCEDURE — 3008F BODY MASS INDEX DOCD: CPT | Performed by: PHYSICIAN ASSISTANT

## 2021-11-10 PROCEDURE — 1036F TOBACCO NON-USER: CPT | Performed by: PHYSICIAN ASSISTANT

## 2021-11-10 PROCEDURE — 3725F SCREEN DEPRESSION PERFORMED: CPT | Performed by: PHYSICIAN ASSISTANT

## 2021-11-10 PROCEDURE — 99214 OFFICE O/P EST MOD 30 MIN: CPT | Performed by: PHYSICIAN ASSISTANT

## 2021-11-11 ENCOUNTER — TELEPHONE (OUTPATIENT)
Dept: FAMILY MEDICINE CLINIC | Facility: CLINIC | Age: 31
End: 2021-11-11

## 2021-11-11 ENCOUNTER — APPOINTMENT (OUTPATIENT)
Dept: LAB | Facility: HOSPITAL | Age: 31
End: 2021-11-11
Payer: COMMERCIAL

## 2021-11-11 DIAGNOSIS — Z11.59 ENCOUNTER FOR HEPATITIS C SCREENING TEST FOR LOW RISK PATIENT: ICD-10-CM

## 2021-11-11 DIAGNOSIS — R73.9 HYPERGLYCEMIA: ICD-10-CM

## 2021-11-11 DIAGNOSIS — E78.2 MIXED HYPERLIPIDEMIA: ICD-10-CM

## 2021-11-11 LAB
ALBUMIN SERPL BCP-MCNC: 3.9 G/DL (ref 3.5–5)
ALP SERPL-CCNC: 91 U/L (ref 46–116)
ALT SERPL W P-5'-P-CCNC: 31 U/L (ref 12–78)
ANION GAP SERPL CALCULATED.3IONS-SCNC: 9 MMOL/L (ref 4–13)
AST SERPL W P-5'-P-CCNC: 18 U/L (ref 5–45)
BILIRUB SERPL-MCNC: 0.83 MG/DL (ref 0.2–1)
BUN SERPL-MCNC: 11 MG/DL (ref 5–25)
CALCIUM SERPL-MCNC: 8.7 MG/DL (ref 8.3–10.1)
CHLORIDE SERPL-SCNC: 108 MMOL/L (ref 100–108)
CHOLEST SERPL-MCNC: 135 MG/DL (ref 50–200)
CO2 SERPL-SCNC: 27 MMOL/L (ref 21–32)
CREAT SERPL-MCNC: 1.2 MG/DL (ref 0.6–1.3)
GFR SERPL CREATININE-BSD FRML MDRD: 80 ML/MIN/1.73SQ M
GLUCOSE P FAST SERPL-MCNC: 101 MG/DL (ref 65–99)
HCV AB SER QL: NORMAL
HDLC SERPL-MCNC: 39 MG/DL
LDLC SERPL CALC-MCNC: 70 MG/DL (ref 0–100)
NONHDLC SERPL-MCNC: 96 MG/DL
POTASSIUM SERPL-SCNC: 4.1 MMOL/L (ref 3.5–5.3)
PROT SERPL-MCNC: 7.3 G/DL (ref 6.4–8.2)
SODIUM SERPL-SCNC: 144 MMOL/L (ref 136–145)
TRIGL SERPL-MCNC: 131 MG/DL

## 2021-11-11 PROCEDURE — 86803 HEPATITIS C AB TEST: CPT

## 2021-11-11 PROCEDURE — 80053 COMPREHEN METABOLIC PANEL: CPT

## 2021-11-11 PROCEDURE — 80061 LIPID PANEL: CPT

## 2021-11-11 PROCEDURE — 36415 COLL VENOUS BLD VENIPUNCTURE: CPT

## 2021-11-13 ENCOUNTER — HOSPITAL ENCOUNTER (OUTPATIENT)
Dept: ULTRASOUND IMAGING | Facility: HOSPITAL | Age: 31
Discharge: HOME | End: 2021-11-13
Payer: COMMERCIAL

## 2021-11-13 DIAGNOSIS — D17.0 LIPOMA OF NECK: ICD-10-CM

## 2021-11-13 PROCEDURE — 76536 US EXAM OF HEAD AND NECK: CPT

## 2021-12-22 DIAGNOSIS — I20.1 CORONARY VASOSPASM (HCC): ICD-10-CM

## 2021-12-22 DIAGNOSIS — E78.2 MIXED HYPERLIPIDEMIA: ICD-10-CM

## 2021-12-22 RX ORDER — DILTIAZEM HYDROCHLORIDE 240 MG/1
240 CAPSULE, EXTENDED RELEASE ORAL DAILY
Qty: 90 CAPSULE | Refills: 3 | Status: SHIPPED | OUTPATIENT
Start: 2021-12-22

## 2021-12-22 RX ORDER — ATORVASTATIN CALCIUM 40 MG/1
40 TABLET, FILM COATED ORAL DAILY
Qty: 90 TABLET | Refills: 3 | Status: SHIPPED | OUTPATIENT
Start: 2021-12-22

## 2021-12-22 RX ORDER — DILTIAZEM HYDROCHLORIDE 240 MG/1
CAPSULE, EXTENDED RELEASE ORAL
Qty: 90 CAPSULE | Refills: 3 | Status: SHIPPED | OUTPATIENT
Start: 2021-12-22 | End: 2021-12-22 | Stop reason: SDUPTHER

## 2022-01-24 NOTE — ASSESSMENT & PLAN NOTE
- Right facial / periorbital facial contusion  Vision grossly intact with symmetrical pupillary response  Comfort measures were chosen and family now requesting to resume hospice. Pt is on MS drip at 1mg/h at time of assmt, RR 18, moderate accessory muscle use noted, she appears comfortable from pain standpoint. Consents completed for hospice.   Updated POLST

## 2022-02-02 NOTE — ASSESSMENT & PLAN NOTE
- resolved most recent CTA  - GCS 15  - no focal deficits  - started on Lovenox 30 mg b i d  cardiovascular stress test, Hx of blood clots, Hyperlipemia, Hypertension, and Pancreatitis. Past Surgical History:  has a past surgical history that includes Coronary angioplasty with stent; Hysterectomy; Temporomandibular joint surgery; back surgery; ECHO Compl W Dop Color Flow (1/10/2013); and Hand surgery (Right, 10/12/2019). Social History:  reports that she has been smoking cigarettes. She has been smoking about 0.50 packs per day. She has never used smokeless tobacco. She reports previous alcohol use. She reports that she does not use drugs. Family History: family history includes Cancer in her brother; Other in her son. The patients home medications have been reviewed. Allergies: Demerol [meperidine hcl], Ultram [tramadol], Compazine [prochlorperazine], Ibuprofen, Toradol [ketorolac tromethamine], and Tylenol with codeine #3 [acetaminophen-codeine]    ---------------------------------------------------PHYSICAL EXAM--------------------------------------  Constitutional/General: Alert and oriented x3, well appearing, non toxic in NAD  Head: Normocephalic and atraumatic  Mouth: Oropharynx clear, handling secretions, no trismus  Neck: Supple, full ROM,  Pulmonary: Diffusely coarse breath sounds, mild expiratory wheezing present, no rales or rhonchi  Cardiovascular:  Regular rate. Regular rhythm. No murmurs  Chest: no chest wall tenderness  Abdomen: Soft. Non tender. Non distended. No rebound, guarding, or rigidity. No pulsatile masses appreciated. Musculoskeletal: Moves all extremities x 4. Warm and well perfused, no clubbing, cyanosis, or edema. Capillary refill <3 seconds  Skin: warm and dry. No rashes. Neurologic: GCS 15, no gross focal neurologic deficits  Psych: Normal Affect    -------------------------------------------------- RESULTS -------------------------------------------------  I have personally reviewed all laboratory and imaging results for this patient.  Results are listed below.     LABS:  Results for orders placed or performed during the hospital encounter of 02/02/22   COVID-19, Rapid    Specimen: Nasopharyngeal Swab   Result Value Ref Range    SARS-CoV-2, NAAT DETECTED (A) Not Detected   Strep Pneumoniae Antigen    Specimen: Urine, clean catch   Result Value Ref Range    STREP PNEUMONIAE ANTIGEN, URINE       Presumptive negative- suggests no current or recent  pneumococcal infection. Infection due to Strep pneumoniae cannot be  ruled out since the antigen present in the sample  may be below the detection limit of the test.  Normal Range:Presumptive Negative     Legionella antigen, urine    Specimen: Urine, clean catch   Result Value Ref Range    L. pneumophila Serogp 1 Ur Ag       Presumptive Negative -suggesting no recent or current infections  with Legionella pneumophila serogroup 1. Infection to Legionella cannot be ruled out since other serogroups  and species may cause infection, antigen may not be present in  early infection, or level of antigen may be below the  detection limit.   Normal Range: Presumptive Negative     CBC Auto Differential   Result Value Ref Range    WBC 10.6 4.5 - 11.5 E9/L    RBC 4.30 3.50 - 5.50 E12/L    Hemoglobin 12.9 11.5 - 15.5 g/dL    Hematocrit 40.3 34.0 - 48.0 %    MCV 93.7 80.0 - 99.9 fL    MCH 30.0 26.0 - 35.0 pg    MCHC 32.0 32.0 - 34.5 %    RDW 13.1 11.5 - 15.0 fL    Platelets 039 517 - 569 E9/L    MPV 10.8 7.0 - 12.0 fL    Neutrophils % 82.2 (H) 43.0 - 80.0 %    Immature Granulocytes % 0.3 0.0 - 5.0 %    Lymphocytes % 8.7 (L) 20.0 - 42.0 %    Monocytes % 8.5 2.0 - 12.0 %    Eosinophils % 0.1 0.0 - 6.0 %    Basophils % 0.2 0.0 - 2.0 %    Neutrophils Absolute 8.67 (H) 1.80 - 7.30 E9/L    Immature Granulocytes # 0.03 E9/L    Lymphocytes Absolute 0.92 (L) 1.50 - 4.00 E9/L    Monocytes Absolute 0.90 0.10 - 0.95 E9/L    Eosinophils Absolute 0.01 (L) 0.05 - 0.50 E9/L    Basophils Absolute 0.02 0.00 - 0.20 E9/L   Comprehensive Metabolic Panel w/ Reflex to MG   Result Value Ref Range    Sodium 136 132 - 146 mmol/L    Potassium reflex Magnesium 3.4 (L) 3.5 - 5.0 mmol/L    Chloride 102 98 - 107 mmol/L    CO2 22 22 - 29 mmol/L    Anion Gap 12 7 - 16 mmol/L    Glucose 89 74 - 99 mg/dL    BUN 11 6 - 23 mg/dL    CREATININE 0.4 (L) 0.5 - 1.0 mg/dL    GFR Non-African American >60 >=60 mL/min/1.73    GFR African American >60     Calcium 9.6 8.6 - 10.2 mg/dL    Total Protein 7.0 6.4 - 8.3 g/dL    Albumin 3.6 3.5 - 5.2 g/dL    Total Bilirubin 0.3 0.0 - 1.2 mg/dL    Alkaline Phosphatase 74 35 - 104 U/L    ALT 13 0 - 32 U/L    AST 19 0 - 31 U/L   Troponin   Result Value Ref Range    Troponin, High Sensitivity <6 0 - 9 ng/L   Brain Natriuretic Peptide   Result Value Ref Range    Pro- (H) 0 - 125 pg/mL   D-Dimer, Quantitative   Result Value Ref Range    D-Dimer, Quant 675 ng/mL DDU   Magnesium   Result Value Ref Range    Magnesium 1.8 1.6 - 2.6 mg/dL   Procalcitonin   Result Value Ref Range    Procalcitonin 0.04 0.00 - 0.08 ng/mL   CBC Auto Differential   Result Value Ref Range    WBC 12.4 (H) 4.5 - 11.5 E9/L    RBC 4.03 3.50 - 5.50 E12/L    Hemoglobin 12.2 11.5 - 15.5 g/dL    Hematocrit 37.7 34.0 - 48.0 %    MCV 93.5 80.0 - 99.9 fL    MCH 30.3 26.0 - 35.0 pg    MCHC 32.4 32.0 - 34.5 %    RDW 13.1 11.5 - 15.0 fL    Platelets 805 910 - 357 E9/L    MPV 11.0 7.0 - 12.0 fL    Neutrophils % 77.3 43.0 - 80.0 %    Immature Granulocytes % 0.4 0.0 - 5.0 %    Lymphocytes % 12.2 (L) 20.0 - 42.0 %    Monocytes % 10.0 2.0 - 12.0 %    Eosinophils % 0.0 0.0 - 6.0 %    Basophils % 0.1 0.0 - 2.0 %    Neutrophils Absolute 9.59 (H) 1.80 - 7.30 E9/L    Immature Granulocytes # 0.05 E9/L    Lymphocytes Absolute 1.51 1.50 - 4.00 E9/L    Monocytes Absolute 1.24 (H) 0.10 - 0.95 E9/L    Eosinophils Absolute 0.00 (L) 0.05 - 0.50 E9/L    Basophils Absolute 0.01 0.00 - 0.20 E9/L   Comprehensive Metabolic Panel w/ Reflex to MG   Result Value Ref Range    Sodium 142 132 - 146 mmol/L    Potassium reflex Magnesium 3.2 (L) 3.5 - 5.0 mmol/L    Chloride 107 98 - 107 mmol/L    CO2 24 22 - 29 mmol/L    Anion Gap 11 7 - 16 mmol/L    Glucose 103 (H) 74 - 99 mg/dL    BUN 12 6 - 23 mg/dL    CREATININE 0.5 0.5 - 1.0 mg/dL    GFR Non-African American >60 >=60 mL/min/1.73    GFR African American >60     Calcium 9.7 8.6 - 10.2 mg/dL    Total Protein 6.8 6.4 - 8.3 g/dL    Albumin 3.6 3.5 - 5.2 g/dL    Total Bilirubin 0.3 0.0 - 1.2 mg/dL    Alkaline Phosphatase 70 35 - 104 U/L    ALT 13 0 - 32 U/L    AST 15 0 - 31 U/L   Lactate Dehydrogenase   Result Value Ref Range     135 - 214 U/L   C-Reactive Protein   Result Value Ref Range    CRP 3.6 (H) 0.0 - 0.4 mg/dL   Magnesium   Result Value Ref Range    Magnesium 2.2 1.6 - 2.6 mg/dL   EKG 12 Lead   Result Value Ref Range    Ventricular Rate 86 BPM    Atrial Rate 86 BPM    P-R Interval 158 ms    QRS Duration 82 ms    Q-T Interval 386 ms    QTc Calculation (Bazett) 461 ms    P Axis 64 degrees    R Axis 52 degrees    T Axis 71 degrees       RADIOLOGY:  Interpreted by Radiologist.  CTA CHEST W CONTRAST   Final Result   1. No CT evidence of pulmonary embolism. 2.  Mild upper lobe centrilobular emphysema. 3.  Mild lower lobe bronchiectasis with soft tissue density within multiple   lower lobe bronchi posteriorly most compatible with mucous plugging. Focal   area of consolidation in the right lower lobe suggestive of pneumonia. Probable reactive hilar adenopathy bilaterally. 4.  7 mm noncalcified soft tissue pulmonary nodule in the peripheral left   lower lobe. RECOMMENDATIONS:   Follow-up noncontrast CT chest in 3-6 months recommended. Fleischner Society guidelines for follow-up and management of incidentally   detected pulmonary nodules:      Multiple Solid Nodules:      ~Nodule size equals 6-8 mm. In a low-risk patient, CT at 3-6 months, then   consider CT at 18-24 months. In a high-risk patient, CT at 3-6 months, then   CT at 18-24 months. Radiology 2017 http://pubs. rsna.org/doi/full/10.1148/radiol. 6381813168         XR CHEST PORTABLE   Final Result   No acute process. EKG:  This EKG is signed and interpreted by me. Normal sinus rhythm rate of 86, no ST segment elevation or depression, ID interval 158 MS, QRS 82 MS,  ms  Interpreted by me      ------------------------- NURSING NOTES AND VITALS REVIEWED ---------------------------   The nursing notes within the ED encounter and vital signs as below have been reviewed by myself. BP (!) 117/56   Pulse 71   Temp 98 °F (36.7 °C) (Infrared)   Resp 20   Ht 5' 5\" (1.651 m)   Wt 118 lb (53.5 kg)   SpO2 91%   BMI 19.64 kg/m²   Oxygen Saturation Interpretation: Normal    The patients available past medical records and past encounters were reviewed.         ------------------------------ ED COURSE/MEDICAL DECISION MAKING----------------------  Medications   sodium chloride flush 0.9 % injection 5-40 mL (10 mLs IntraVENous Given 2/3/22 0844)   sodium chloride flush 0.9 % injection 5-40 mL (has no administration in time range)   0.9 % sodium chloride infusion (has no administration in time range)   enoxaparin (LOVENOX) injection 30 mg (30 mg SubCUTAneous Given 2/3/22 2143)   ondansetron (ZOFRAN-ODT) disintegrating tablet 4 mg (has no administration in time range)     Or   ondansetron (ZOFRAN) injection 4 mg (has no administration in time range)   polyethylene glycol (GLYCOLAX) packet 17 g (has no administration in time range)   acetaminophen (TYLENOL) tablet 650 mg (650 mg Oral Given 2/3/22 4449)     Or   acetaminophen (TYLENOL) suppository 650 mg ( Rectal See Alternative 2/3/22 5289)   Vitamin D (CHOLECALCIFEROL) tablet 2,000 Units (2,000 Units Oral Given 2/3/22 0842)   zinc sulfate (ZINCATE) capsule 50 mg (50 mg Oral Given 2/3/22 0842)   vitamin B-6 (PYRIDOXINE) tablet 50 mg (50 mg Oral Given 2/3/22 0842)   vitamin C tablet 250 mg (250 mg Oral Given 2/3/22 0842) ipratropium-albuterol (DUONEB) nebulizer solution 1 ampule (1 ampule Inhalation Given 2/3/22 1018)   acetylcysteine (MUCOMYST) 10 % solution 400 mg (400 mg Inhalation Given 2/3/22 1018)   busPIRone (BUSPAR) tablet 5 mg (5 mg Oral Given 2/3/22 0842)   guaiFENesin (MUCINEX) extended release tablet 1,200 mg (1,200 mg Oral Given 2/3/22 0842)   mirtazapine (REMERON) tablet 30 mg (30 mg Oral Given 2/2/22 1953)   buprenorphine-naloxone (SUBOXONE) 8-2 MG SL tablet 1 tablet (1 tablet SubLINGual Given 2/3/22 0842)   methylPREDNISolone sodium (SOLU-MEDROL) injection 40 mg (40 mg IntraVENous Given 2/3/22 0843)   ipratropium-albuterol (DUONEB) nebulizer solution 3 mL (3 mLs Inhalation Given 2/2/22 1151)   methylPREDNISolone sodium (SOLU-MEDROL) injection 125 mg (125 mg IntraVENous Given 2/2/22 1140)   0.9 % sodium chloride bolus (0 mLs IntraVENous Stopped 2/2/22 1455)   acetaminophen (TYLENOL) tablet 650 mg (650 mg Oral Given 2/2/22 1314)   iopamidol (ISOVUE-370) 76 % injection 75 mL (75 mLs IntraVENous Given 2/2/22 1338)             Medical Decision Making:   I, Dr. Ashley Meredith am the primary physician of record. Sapphire Velazquez is a 70 y.o. female who presents to the ED for shortness of breath and hypoxia. Differential diagnosis for known to COVID-19, pulmonary embolus, pneumonia, COPD exacerbation. The patient did have labs and imaging which were reviewed. Patient has CBC which is fairly unremarkable, CMP unremarkable, high-sensitivity troponin less than 6, D-dimer mildly elevated so CTA of the chest was ordered with no pulmonary embolus seen. The patient did receive Solu-Medrol in the emergency department the Decadron was not given, patient did receive DuoNeb as well as Tylenol. Re-Evaluations/Consultations: The results of today were discussed.   Patient will be admitted             ED Course as of 02/03/22 1401   Wed Feb 02, 2022   5564 Spoke with Dr. Horris Dara he will accept the patient for admission [MT] ED Course User Index  [MT] Yuridia Sprague DO               This patient's ED course included: History, physical examination, reevaluation prior to disposition, labs, imaging, telemetry monitoring, EKG, IV medications    This patient has remained hemodynamically stable during their ED course. Counseling: The emergency provider has spoken with the patient and discussed todays results, in addition to providing specific details for the plan of care and counseling regarding the diagnosis and prognosis. Questions are answered at this time and they are agreeable with the plan.       --------------------------------- IMPRESSION AND DISPOSITION ---------------------------------    IMPRESSION  1. COVID-19    2. Acute respiratory failure with hypoxia (HCC)        DISPOSITION  Disposition: Admit to telemetry  Patient condition is stable        NOTE: This report was transcribed using voice recognition software.  Every effort was made to ensure accuracy; however, inadvertent computerized transcription errors may be present         Yuridia Sprague DO  02/03/22 1400

## 2023-04-11 PROBLEM — Z00.01 ENCOUNTER FOR GENERAL ADULT MEDICAL EXAMINATION WITH ABNORMAL FINDINGS: Status: ACTIVE | Noted: 2020-09-24

## 2023-04-11 PROBLEM — G51.0 FACIAL NERVE PALSY: Status: RESOLVED | Noted: 2020-09-08 | Resolved: 2023-04-11

## 2023-04-11 PROBLEM — I10 HYPERTENSION: Status: RESOLVED | Noted: 2020-09-09 | Resolved: 2023-04-11

## 2023-04-11 PROBLEM — S15.009A INTERNAL CAROTID ARTERY INJURY: Status: RESOLVED | Noted: 2020-08-31 | Resolved: 2023-04-11

## 2023-04-11 PROBLEM — D17.0 LIPOMA OF NECK: Status: RESOLVED | Noted: 2021-11-10 | Resolved: 2023-04-11

## 2023-04-28 DIAGNOSIS — D72.819 LEUKOPENIA, UNSPECIFIED TYPE: Primary | ICD-10-CM

## 2023-12-14 ENCOUNTER — APPOINTMENT (OUTPATIENT)
Dept: URGENT CARE | Facility: MEDICAL CENTER | Age: 33
End: 2023-12-14

## 2024-01-24 ENCOUNTER — TRANSITIONAL CARE MANAGEMENT (OUTPATIENT)
Dept: FAMILY MEDICINE CLINIC | Facility: CLINIC | Age: 34
End: 2024-01-24

## 2024-04-04 NOTE — ASSESSMENT & PLAN NOTE
- There is a nondisplaced talar fracture of the right inferior temporal bone  - Non-operative management per OMFS/Optho  - Multimodal analgesic regimen  - Will likely need outpatient ENT follow-up for audiology evaluation  LVM to notify patient MRA was authorized.

## 2024-04-16 ENCOUNTER — OFFICE VISIT (OUTPATIENT)
Dept: FAMILY MEDICINE CLINIC | Facility: CLINIC | Age: 34
End: 2024-04-16
Payer: COMMERCIAL

## 2024-04-16 VITALS
TEMPERATURE: 98 F | SYSTOLIC BLOOD PRESSURE: 118 MMHG | WEIGHT: 173 LBS | HEART RATE: 76 BPM | OXYGEN SATURATION: 98 % | RESPIRATION RATE: 16 BRPM | DIASTOLIC BLOOD PRESSURE: 80 MMHG | HEIGHT: 67 IN | BODY MASS INDEX: 27.15 KG/M2

## 2024-04-16 DIAGNOSIS — M54.2 NECK PAIN: ICD-10-CM

## 2024-04-16 DIAGNOSIS — E78.2 MIXED HYPERLIPIDEMIA: ICD-10-CM

## 2024-04-16 DIAGNOSIS — L91.8 ACROCHORDON: ICD-10-CM

## 2024-04-16 DIAGNOSIS — R10.30 LOWER ABDOMINAL PAIN: ICD-10-CM

## 2024-04-16 DIAGNOSIS — Z00.01 ENCOUNTER FOR GENERAL ADULT MEDICAL EXAMINATION WITH ABNORMAL FINDINGS: Primary | ICD-10-CM

## 2024-04-16 DIAGNOSIS — R73.03 PRE-DIABETES: ICD-10-CM

## 2024-04-16 PROCEDURE — 99395 PREV VISIT EST AGE 18-39: CPT | Performed by: FAMILY MEDICINE

## 2024-04-16 PROCEDURE — 99215 OFFICE O/P EST HI 40 MIN: CPT | Performed by: FAMILY MEDICINE

## 2024-04-16 PROCEDURE — 11200 RMVL SKIN TAGS UP TO&INC 15: CPT | Performed by: FAMILY MEDICINE
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
## 2024-04-23 ENCOUNTER — LAB (OUTPATIENT)
Dept: LAB | Facility: HOSPITAL | Age: 34
End: 2024-04-23
Payer: COMMERCIAL

## 2024-04-23 DIAGNOSIS — E78.2 MIXED HYPERLIPIDEMIA: ICD-10-CM

## 2024-04-23 DIAGNOSIS — R73.03 PRE-DIABETES: ICD-10-CM

## 2024-04-23 LAB
CHOLEST SERPL-MCNC: 208 MG/DL
EST. AVERAGE GLUCOSE BLD GHB EST-MCNC: 97 MG/DL
HBA1C MFR BLD: 5 %
HDLC SERPL-MCNC: 40 MG/DL
LDLC SERPL CALC-MCNC: 122 MG/DL (ref 0–100)
TRIGL SERPL-MCNC: 232 MG/DL

## 2024-04-23 PROCEDURE — 83036 HEMOGLOBIN GLYCOSYLATED A1C: CPT

## 2024-04-23 PROCEDURE — 36415 COLL VENOUS BLD VENIPUNCTURE: CPT

## 2024-04-23 PROCEDURE — 80061 LIPID PANEL: CPT

## 2024-04-26 NOTE — RESULT ENCOUNTER NOTE
Please call patient, the cholesterol is high.  Cholesterol improved from 1 year ago.  He is still needs to continue in lifestyle modification like more exercise and decrease fast food in diet.  The diabetes test was normal.

## 2024-04-29 DIAGNOSIS — R10.13 EPIGASTRIC PAIN: Primary | ICD-10-CM

## 2024-04-29 DIAGNOSIS — H93.12 TINNITUS OF LEFT EAR: ICD-10-CM

## 2024-04-29 DIAGNOSIS — H93.13 TINNITUS OF BOTH EARS: ICD-10-CM

## 2024-06-09 ENCOUNTER — APPOINTMENT (EMERGENCY)
Dept: RADIOLOGY | Facility: HOSPITAL | Age: 34
End: 2024-06-09
Payer: COMMERCIAL

## 2024-06-09 ENCOUNTER — OFFICE VISIT (OUTPATIENT)
Dept: URGENT CARE | Age: 34
End: 2024-06-09
Payer: COMMERCIAL

## 2024-06-09 ENCOUNTER — APPOINTMENT (EMERGENCY)
Dept: CT IMAGING | Facility: HOSPITAL | Age: 34
End: 2024-06-09
Payer: COMMERCIAL

## 2024-06-09 ENCOUNTER — HOSPITAL ENCOUNTER (EMERGENCY)
Facility: HOSPITAL | Age: 34
Discharge: HOME | End: 2024-06-09
Attending: EMERGENCY MEDICINE
Payer: COMMERCIAL

## 2024-06-09 VITALS
TEMPERATURE: 97.8 F | SYSTOLIC BLOOD PRESSURE: 180 MMHG | BODY MASS INDEX: 27.84 KG/M2 | WEIGHT: 177.4 LBS | OXYGEN SATURATION: 99 % | RESPIRATION RATE: 20 BRPM | HEART RATE: 68 BPM | HEIGHT: 67 IN | DIASTOLIC BLOOD PRESSURE: 106 MMHG

## 2024-06-09 VITALS
BODY MASS INDEX: 27.76 KG/M2 | RESPIRATION RATE: 16 BRPM | DIASTOLIC BLOOD PRESSURE: 91 MMHG | WEIGHT: 177.25 LBS | TEMPERATURE: 97.8 F | HEART RATE: 78 BPM | SYSTOLIC BLOOD PRESSURE: 141 MMHG | OXYGEN SATURATION: 100 %

## 2024-06-09 DIAGNOSIS — R51.9 INTRACTABLE HEADACHE, UNSPECIFIED CHRONICITY PATTERN, UNSPECIFIED HEADACHE TYPE: ICD-10-CM

## 2024-06-09 DIAGNOSIS — I10 HYPERTENSION, UNSPECIFIED TYPE: Primary | ICD-10-CM

## 2024-06-09 DIAGNOSIS — R51.9 HEADACHE: Primary | ICD-10-CM

## 2024-06-09 DIAGNOSIS — N28.9 RENAL INSUFFICIENCY: ICD-10-CM

## 2024-06-09 DIAGNOSIS — H53.8 BLURRY VISION: ICD-10-CM

## 2024-06-09 DIAGNOSIS — R03.0 ELEVATED BLOOD PRESSURE READING: ICD-10-CM

## 2024-06-09 LAB
ALBUMIN SERPL BCP-MCNC: 4.5 G/DL (ref 3.5–5)
ALP SERPL-CCNC: 75 U/L (ref 34–104)
ALT SERPL W P-5'-P-CCNC: 29 U/L (ref 7–52)
ANION GAP SERPL CALCULATED.3IONS-SCNC: 4 MMOL/L (ref 4–13)
AST SERPL W P-5'-P-CCNC: 23 U/L (ref 13–39)
ATRIAL RATE: 58 BPM
BASOPHILS # BLD AUTO: 0.02 THOUSANDS/ÂΜL (ref 0–0.1)
BASOPHILS NFR BLD AUTO: 1 % (ref 0–1)
BILIRUB SERPL-MCNC: 0.99 MG/DL (ref 0.2–1)
BUN SERPL-MCNC: 16 MG/DL (ref 5–25)
CALCIUM SERPL-MCNC: 9.6 MG/DL (ref 8.4–10.2)
CARDIAC TROPONIN I PNL SERPL HS: <2 NG/L
CHLORIDE SERPL-SCNC: 103 MMOL/L (ref 96–108)
CO2 SERPL-SCNC: 30 MMOL/L (ref 21–32)
CREAT SERPL-MCNC: 1.34 MG/DL (ref 0.6–1.3)
EOSINOPHIL # BLD AUTO: 0.03 THOUSAND/ÂΜL (ref 0–0.61)
EOSINOPHIL NFR BLD AUTO: 1 % (ref 0–6)
ERYTHROCYTE [DISTWIDTH] IN BLOOD BY AUTOMATED COUNT: 11.9 % (ref 11.6–15.1)
GFR SERPL CREATININE-BSD FRML MDRD: 68 ML/MIN/1.73SQ M
GLUCOSE SERPL-MCNC: 85 MG/DL (ref 65–140)
HCT VFR BLD AUTO: 45.4 % (ref 36.5–49.3)
HGB BLD-MCNC: 16.4 G/DL (ref 12–17)
IMM GRANULOCYTES # BLD AUTO: 0.01 THOUSAND/UL (ref 0–0.2)
IMM GRANULOCYTES NFR BLD AUTO: 0 % (ref 0–2)
LYMPHOCYTES # BLD AUTO: 1.96 THOUSANDS/ÂΜL (ref 0.6–4.47)
LYMPHOCYTES NFR BLD AUTO: 51 % (ref 14–44)
MCH RBC QN AUTO: 30.9 PG (ref 26.8–34.3)
MCHC RBC AUTO-ENTMCNC: 36.1 G/DL (ref 31.4–37.4)
MCV RBC AUTO: 86 FL (ref 82–98)
MONOCYTES # BLD AUTO: 0.22 THOUSAND/ÂΜL (ref 0.17–1.22)
MONOCYTES NFR BLD AUTO: 6 % (ref 4–12)
NEUTROPHILS # BLD AUTO: 1.55 THOUSANDS/ÂΜL (ref 1.85–7.62)
NEUTS SEG NFR BLD AUTO: 41 % (ref 43–75)
NRBC BLD AUTO-RTO: 0 /100 WBCS
P AXIS: 19 DEGREES
PLATELET # BLD AUTO: 230 THOUSANDS/UL (ref 149–390)
PMV BLD AUTO: 9.4 FL (ref 8.9–12.7)
POTASSIUM SERPL-SCNC: 4.1 MMOL/L (ref 3.5–5.3)
PR INTERVAL: 142 MS
PROT SERPL-MCNC: 7.9 G/DL (ref 6.4–8.4)
QRS AXIS: 89 DEGREES
QRSD INTERVAL: 80 MS
QT INTERVAL: 370 MS
QTC INTERVAL: 363 MS
RBC # BLD AUTO: 5.3 MILLION/UL (ref 3.88–5.62)
SODIUM SERPL-SCNC: 137 MMOL/L (ref 135–147)
T WAVE AXIS: -6 DEGREES
VENTRICULAR RATE: 58 BPM
WBC # BLD AUTO: 3.79 THOUSAND/UL (ref 4.31–10.16)

## 2024-06-09 PROCEDURE — 70450 CT HEAD/BRAIN W/O DYE: CPT

## 2024-06-09 PROCEDURE — 96365 THER/PROPH/DIAG IV INF INIT: CPT

## 2024-06-09 PROCEDURE — 85025 COMPLETE CBC W/AUTO DIFF WBC: CPT | Performed by: EMERGENCY MEDICINE

## 2024-06-09 PROCEDURE — 93010 ELECTROCARDIOGRAM REPORT: CPT

## 2024-06-09 PROCEDURE — 99213 OFFICE O/P EST LOW 20 MIN: CPT | Performed by: PHYSICIAN ASSISTANT

## 2024-06-09 PROCEDURE — 99284 EMERGENCY DEPT VISIT MOD MDM: CPT

## 2024-06-09 PROCEDURE — 36415 COLL VENOUS BLD VENIPUNCTURE: CPT

## 2024-06-09 PROCEDURE — 96375 TX/PRO/DX INJ NEW DRUG ADDON: CPT

## 2024-06-09 PROCEDURE — 84484 ASSAY OF TROPONIN QUANT: CPT | Performed by: EMERGENCY MEDICINE

## 2024-06-09 PROCEDURE — 93005 ELECTROCARDIOGRAM TRACING: CPT

## 2024-06-09 PROCEDURE — 99285 EMERGENCY DEPT VISIT HI MDM: CPT

## 2024-06-09 PROCEDURE — 80053 COMPREHEN METABOLIC PANEL: CPT | Performed by: EMERGENCY MEDICINE

## 2024-06-09 PROCEDURE — 71046 X-RAY EXAM CHEST 2 VIEWS: CPT

## 2024-06-09 RX ORDER — METOCLOPRAMIDE HYDROCHLORIDE 5 MG/ML
10 INJECTION INTRAMUSCULAR; INTRAVENOUS ONCE
Status: COMPLETED | OUTPATIENT
Start: 2024-06-09 | End: 2024-06-09

## 2024-06-09 RX ORDER — KETOROLAC TROMETHAMINE 30 MG/ML
30 INJECTION, SOLUTION INTRAMUSCULAR; INTRAVENOUS ONCE
Status: COMPLETED | OUTPATIENT
Start: 2024-06-09 | End: 2024-06-09

## 2024-06-09 RX ORDER — DIPHENHYDRAMINE HYDROCHLORIDE 50 MG/ML
25 INJECTION INTRAMUSCULAR; INTRAVENOUS ONCE
Status: COMPLETED | OUTPATIENT
Start: 2024-06-09 | End: 2024-06-09

## 2024-06-09 RX ORDER — MAGNESIUM SULFATE HEPTAHYDRATE 40 MG/ML
2 INJECTION, SOLUTION INTRAVENOUS ONCE
Status: COMPLETED | OUTPATIENT
Start: 2024-06-09 | End: 2024-06-09

## 2024-06-09 RX ADMIN — KETOROLAC TROMETHAMINE 30 MG: 30 INJECTION, SOLUTION INTRAMUSCULAR at 13:55

## 2024-06-09 RX ADMIN — SODIUM CHLORIDE 1000 ML: 0.9 INJECTION, SOLUTION INTRAVENOUS at 14:02

## 2024-06-09 RX ADMIN — DIPHENHYDRAMINE HYDROCHLORIDE 25 MG: 50 INJECTION, SOLUTION INTRAMUSCULAR; INTRAVENOUS at 13:57

## 2024-06-09 RX ADMIN — METOCLOPRAMIDE 10 MG: 5 INJECTION, SOLUTION INTRAMUSCULAR; INTRAVENOUS at 13:59

## 2024-06-09 RX ADMIN — MAGNESIUM SULFATE HEPTAHYDRATE 2 G: 40 INJECTION, SOLUTION INTRAVENOUS at 14:03

## 2024-06-09 NOTE — ED PROVIDER NOTES
History  Chief Complaint   Patient presents with    Headache    High Blood Pressure     Pt c/o headache and fatigue  x3 days, reports check BP at home and it was high, denies hx HTN.      This is a 34-year-old male who presents to the ED for evaluation of headache X approximately 3 days.  Patient denies any recent falls or trauma, reports the pain had an insidious onset, in ED states pain is 8/10 at its worst.  Reports diffuse pain of his head states it radiates from his forehead over the top of his to his occiput, denies any neck pain or stiffness.  Reports he has had intermittent blurred vision bilaterally since the headache started.  Denies any double vision or loss of vision.  He states that will wax and wane in severity at random, pain is not positional.  States he has been intermittently taking Tylenol at home with minimal improvement.  Does also report elevated blood pressure, denies any history of hypertension, states he took his blood pressure at home and was approximately 160/110.  He denies any recent fevers, lightheadedness, sensation of vertigo, chest pain, SOB, abdominal pain, NVD, dysuria, pain or swelling in either leg.  He is ambulatory in the ED.        None       Past Medical History:   Diagnosis Date    CKD (chronic kidney disease) stage 2, GFR 60-89 ml/min     Coronary artery disease     History of coronary vasospasm     History of subarachnoid hemorrhage 09/06/2020    Hyperglycemia     Hyperlipidemia     Post concussion syndrome 09/15/2020    SAH (subarachnoid hemorrhage) ([facility]) 08/30/2020    Subarachnoid hemorrhage ([facility]) 2020       Past Surgical History:   Procedure Laterality Date    ORIF WRIST FRACTURE Bilateral 9/1/2020    Procedure: Open reduction internal fixation right two-part intra-articular distal radius fracture.  Open reduction internal fixation left fracture dislocation distal radius.  Application bilateral short-arm splints.;  Surgeon: Harris Davis MD;  Location: [facility];   Service: Orthopedics       Family History   Problem Relation Age of Onset    Hypertension Mother     Hyperlipidemia Mother     Hypertension Father     Hyperlipidemia Father      I have reviewed and agree with the history as documented.    E-Cigarette/Vaping    E-Cigarette Use Never User      E-Cigarette/Vaping Substances    Nicotine No     THC No     CBD No     Flavoring No     Other No     Unknown No      Social History     Tobacco Use    Smoking status: Never    Smokeless tobacco: Never   Vaping Use    Vaping status: Never Used   Substance Use Topics    Alcohol use: Not Currently     Alcohol/week: 8.0 standard drinks of alcohol     Types: 1 Glasses of wine, 5 Cans of beer, 2 Shots of liquor per week     Comment: Drinking casually on weekends; not a daily drinker    Drug use: Never       Review of Systems   Constitutional:  Negative for chills and fever.   Eyes:  Positive for photophobia. Negative for visual disturbance.   Respiratory:  Negative for cough and shortness of breath.    Gastrointestinal:  Negative for abdominal pain, diarrhea, nausea and vomiting.   Genitourinary:  Negative for difficulty urinating, dysuria, flank pain and hematuria.   Musculoskeletal:  Negative for arthralgias, back pain, gait problem, neck pain and neck stiffness.   Neurological:  Positive for headaches. Negative for dizziness, syncope, weakness and light-headedness.       Physical Exam  Physical Exam  Vitals and nursing note reviewed.   Constitutional:       General: He is not in acute distress.     Appearance: He is well-developed. He is not toxic-appearing or diaphoretic.      Comments: Patient does not appear to be in acute distress on initial exam.   HENT:      Head: Normocephalic and atraumatic.      Right Ear: Tympanic membrane, ear canal and external ear normal.      Left Ear: Tympanic membrane, ear canal and external ear normal.   Eyes:      General: No visual field deficit.     Extraocular Movements: Extraocular movements  intact.      Conjunctiva/sclera: Conjunctivae normal.      Pupils: Pupils are equal, round, and reactive to light.   Cardiovascular:      Rate and Rhythm: Normal rate and regular rhythm.      Heart sounds: No murmur heard.  Pulmonary:      Effort: Pulmonary effort is normal. No respiratory distress.      Breath sounds: Normal breath sounds.   Abdominal:      General: There is no distension.      Palpations: Abdomen is soft.      Tenderness: There is no abdominal tenderness. There is no right CVA tenderness, left CVA tenderness or guarding.      Comments: Abdomen diffusely nontender on light and deep palpation.   Musculoskeletal:         General: No swelling.      Cervical back: Normal range of motion and neck supple.      Right lower leg: No edema.      Left lower leg: No edema.   Skin:     General: Skin is warm and dry.      Capillary Refill: Capillary refill takes less than 2 seconds.   Neurological:      General: No focal deficit present.      Mental Status: He is alert and oriented to person, place, and time.      GCS: GCS eye subscore is 4. GCS verbal subscore is 5. GCS motor subscore is 6.      Cranial Nerves: Cranial nerves 2-12 are intact. No dysarthria or facial asymmetry.      Sensory: No sensory deficit.      Motor: Motor function is intact.      Coordination: Coordination is intact.      Gait: Gait is intact. Gait normal.   Psychiatric:         Mood and Affect: Mood normal.         Vital Signs  ED Triage Vitals [06/09/24 1214]   Temperature Pulse Respirations Blood Pressure SpO2   97.8 °F (36.6 °C) 73 16 (!) 165/110 100 %      Temp Source Heart Rate Source Patient Position - Orthostatic VS BP Location FiO2 (%)   Oral Monitor Sitting Right arm --      Pain Score       8           Vitals:    06/09/24 1330 06/09/24 1400 06/09/24 1430 06/09/24 1500   BP: (!) 160/108 (!) 167/112 145/91 141/91   Pulse: 61 63 65 78   Patient Position - Orthostatic VS:  Sitting Sitting Lying         Visual Acuity      ED  Medications  Medications   ketorolac (TORADOL) injection 30 mg (30 mg Intravenous Given 6/9/24 1355)   metoclopramide (REGLAN) injection 10 mg (10 mg Intravenous Given 6/9/24 1359)   diphenhydrAMINE (BENADRYL) injection 25 mg (25 mg Intravenous Given 6/9/24 1357)   magnesium sulfate 2 g/50 mL IVPB (premix) 2 g (0 g Intravenous Stopped 6/9/24 1507)   sodium chloride 0.9 % bolus 1,000 mL (0 mL Intravenous Stopped 6/9/24 1507)       Diagnostic Studies  Results Reviewed       Procedure Component Value Units Date/Time    HS Troponin 0hr (reflex protocol) [[id number]]  (Normal) Collected: 06/09/24 1310    Lab Status: Final result Specimen: Blood from Arm, Left Updated: 06/09/24 1338     hs TnI 0hr <2 ng/L     Comprehensive metabolic panel [[id number]]  (Abnormal) Collected: 06/09/24 1310    Lab Status: Final result Specimen: Blood from Arm, Left Updated: 06/09/24 1330     Sodium 137 mmol/L      Potassium 4.1 mmol/L      Chloride 103 mmol/L      CO2 30 mmol/L      ANION GAP 4 mmol/L      BUN 16 mg/dL      Creatinine 1.34 mg/dL      Glucose 85 mg/dL      Calcium 9.6 mg/dL      AST 23 U/L      ALT 29 U/L      Alkaline Phosphatase 75 U/L      Total Protein 7.9 g/dL      Albumin 4.5 g/dL      Total Bilirubin 0.99 mg/dL      eGFR 68 ml/min/1.73sq m     Narrative:      National Kidney Disease Foundation guidelines for Chronic Kidney Disease (CKD):     Stage 1 with normal or high GFR (GFR > 90 mL/min/1.73 square meters)    Stage 2 Mild CKD (GFR = 60-89 mL/min/1.73 square meters)    Stage 3A Moderate CKD (GFR = 45-59 mL/min/1.73 square meters)    Stage 3B Moderate CKD (GFR = 30-44 mL/min/1.73 square meters)    Stage 4 Severe CKD (GFR = 15-29 mL/min/1.73 square meters)    Stage 5 End Stage CKD (GFR <15 mL/min/1.73 square meters)  Note: GFR calculation is accurate only with a steady state creatinine    CBC and differential [[id number]]  (Abnormal) Collected: 06/09/24 1310    Lab Status: Final result Specimen: Blood from Arm, Left  Updated: 06/09/24 1315     WBC 3.79 Thousand/uL      RBC 5.30 Million/uL      Hemoglobin 16.4 g/dL      Hematocrit 45.4 %      MCV 86 fL      MCH 30.9 pg      MCHC 36.1 g/dL      RDW 11.9 %      MPV 9.4 fL      Platelets 230 Thousands/uL      nRBC 0 /100 WBCs      Segmented % 41 %      Immature Grans % 0 %      Lymphocytes % 51 %      Monocytes % 6 %      Eosinophils Relative 1 %      Basophils Relative 1 %      Absolute Neutrophils 1.55 Thousands/µL      Absolute Immature Grans 0.01 Thousand/uL      Absolute Lymphocytes 1.96 Thousands/µL      Absolute Monocytes 0.22 Thousand/µL      Eosinophils Absolute 0.03 Thousand/µL      Basophils Absolute 0.02 Thousands/µL                    CT head without contrast   Final Result by Michael Owens MD (06/09 1425)      No acute intracranial abnormality.                  Workstation performed: [id number]         XR chest 2 views   ED Interpretation by Facundo Lau PA-C (06/09 1348)   ED interpretation: No acute cardiopulmonary disease.                 Procedures  ECG 12 Lead Documentation Only    Date/Time: 6/9/2024 2:03 PM    Performed by: Facundo Lau PA-C  Authorized by: Facundo Lau PA-C    Rate:     ECG rate:  58    ECG rate assessment: bradycardic    Rhythm:     Rhythm: sinus bradycardia    Ectopy:     Ectopy: none    QRS:     QRS axis:  Normal  Conduction:     Conduction: normal    ST segments:     ST segments:  Normal  T waves:     T waves: inverted      Inverted:  III (Normal T waves in leads II, aVF.)           ED Course  ED Course as of 06/09/24 1508   Sun Jun 09, 2024   1401 Creatinine(!): 1.34  Baseline 1.15-1.27.  Stable from prior labs.   1428 CT head without contrast     IMPRESSION:     No acute intracranial abnormality.        1457 WBC(!): 3.79  History of leukopenia, improved from prior.   1459 Patient reports improvement in headache status post migraine cocktail.                               SBIRT 22yo+      Flowsheet Row Most Recent Value   Initial  Alcohol Screen: US AUDIT-C     1. How often do you have a drink containing alcohol? 0 Filed at: 06/09/2024 1343   2. How many drinks containing alcohol do you have on a typical day you are drinking?  0 Filed at: 06/09/2024 1343   3a. Male UNDER 65: How often do you have five or more drinks on one occasion? 0 Filed at: 06/09/2024 1343   3b. FEMALE Any Age, or MALE 65+: How often do you have 4 or more drinks on one occassion? 0 Filed at: 06/09/2024 1343   Audit-C Score 0 Filed at: 06/09/2024 1343   VI: How many times in the past year have you...    Used an illegal drug or used a prescription medication for non-medical reasons? Never Filed at: 06/09/2024 1343                      Medical Decision Making  34-year-old male presents the ED for evaluation of 3 days of headache.  No focal deficits on exam, vision appears grossly intact without deficits, headache is nonpositional, no thunderclap sensation, afebrile.  Patient reported improvement in symptoms status post migraine cocktail.  Blood pressure improved status post migraine cocktail, suspect may be elevated secondary to headache.  Patient vies follow-up with PCP for further evaluation and management.  Strict ED return precautions discussed with patient.  Patient verbalized understanding and agreement with plan.    Amount and/or Complexity of Data Reviewed  Labs: ordered. Decision-making details documented in ED Course.  Radiology: ordered and independent interpretation performed.    Risk  Prescription drug management.             Disposition  Final diagnoses:   Renal insufficiency   Headache   Elevated blood pressure reading     Time reflects when diagnosis was documented in both MDM as applicable and the Disposition within this note       Time User Action Codes Description Comment    6/9/2024  2:58 PM Facundo Lau Add [N28.9] Renal insufficiency     6/9/2024  3:03 PM Facundo Lau Add [R51.9] Headache     6/9/2024  3:03 PM Facundo Lau Modify [N28.9] Renal  insufficiency     6/9/2024  3:03 PM Facundo Lau Modify [R51.9] Headache     6/9/2024  3:03 PM Facundo Lau Add [R03.0] Elevated blood pressure reading           ED Disposition       ED Disposition   Discharge    Condition   Stable    Date/Time   Sun Jun 9, 2024 1503    Comment   Vinod Haddad discharge to home/self care.                   Follow-up Information       Follow up With Specialties Details Why Contact Info    Petr De La Garza MD Family Medicine Schedule an appointment as soon as possible for a visit  For re-check [address] [place] [id number]  [phone]              Patient's Medications    No medications on file       No discharge procedures on file.    PDMP Review         Value Time User    PDMP Reviewed  Yes 9/12/2020  9:44 AM Breanna Medina PA-C            ED Provider  Electronically Signed by             Facundo Lau PA-C  06/09/24 [id number]

## 2024-06-09 NOTE — DISCHARGE INSTRUCTIONS
Please follow-up with your primary care provider for further evaluation and management.  Return to the ED if you develop any new or worsening symptoms.

## 2024-06-09 NOTE — PROGRESS NOTES
[facility]      NAME: Vinod Haddad is a 34 y.o. male  : 1990    MRN: [id number]  DATE: 2024  TIME: 11:49 AM    Assessment and Plan   Hypertension, unspecified type [I10]  1. Hypertension, unspecified type  Transfer to other facility      2. Blurry vision        3. Intractable headache, unspecified chronicity pattern, unspecified headache type            Patient Instructions   Patient declined an ambulance.   ARTURO will drive patient to [facility] ER for further workup.   Chief Complaint     Chief Complaint   Patient presents with    Headache     Pain ongoing for three days; pt has taken Tylenol but it has not helped. Pt also reports fatigue.     Hypertension     Pt reports that self-recorded blood pressure has a diastolic and systolic number both over 100; coincides with headache.          History of Present Illness   Vinod Haddad presents to the clinic c/o    Hypertension  This is a new problem. The current episode started in the past 7 days. The problem is unchanged. Associated symptoms include blurred vision and malaise/fatigue. Pertinent negatives include no chest pain, headaches, palpitations or shortness of breath. Risk factors for coronary artery disease include male gender. Past treatments include nothing.   Patient reports headache last 3 days as well with occasional blurry vision. Family hx of strokes. Denies high salt intake, caffeine usage, drug use or smoking.    Review of Systems   Review of Systems   Constitutional:  Positive for fatigue and malaise/fatigue. Negative for chills, diaphoresis and fever.   HENT:  Negative for congestion, ear discharge, ear pain and facial swelling.    Eyes:  Positive for blurred vision. Negative for photophobia, pain, discharge, redness, itching and visual disturbance.   Respiratory:  Negative for apnea, cough, chest tightness, shortness of breath and wheezing.    Cardiovascular:  Negative for chest pain and palpitations.    Gastrointestinal:  Negative for abdominal pain.   Skin:  Negative for color change, rash and wound.   Neurological:  Negative for dizziness and headaches.   Hematological:  Negative for adenopathy.         Current Medications     No long-term medications on file.       Current Allergies     Allergies as of 06/09/2024 - Reviewed 06/09/2024   Allergen Reaction Noted    No known allergies  07/03/2019            The following portions of the patient's history were reviewed and updated as appropriate: allergies, current medications, past family history, past medical history, past social history, past surgical history and problem list.  Past Medical History:   Diagnosis Date    CKD (chronic kidney disease) stage 2, GFR 60-89 ml/min     Coronary artery disease     History of coronary vasospasm     History of subarachnoid hemorrhage 09/06/2020    Hyperglycemia     Hyperlipidemia     Post concussion syndrome 09/15/2020    SAH (subarachnoid hemorrhage) ([facility]) 08/30/2020    Subarachnoid hemorrhage ([facility]) 2020     Past Surgical History:   Procedure Laterality Date    ORIF WRIST FRACTURE Bilateral 9/1/2020    Procedure: Open reduction internal fixation right two-part intra-articular distal radius fracture.  Open reduction internal fixation left fracture dislocation distal radius.  Application bilateral short-arm splints.;  Surgeon: Harris Davis MD;  Location: [facility];  Service: Orthopedics     Social History     Socioeconomic History    Marital status: /Civil Union     Spouse name: Not on file    Number of children: Not on file    Years of education: Not on file    Highest education level: Not on file   Occupational History    Not on file   Tobacco Use    Smoking status: Never    Smokeless tobacco: Never   Vaping Use    Vaping status: Never Used   Substance and Sexual Activity    Alcohol use: Not Currently     Alcohol/week: 8.0 standard drinks of alcohol     Types: 1 Glasses of wine, 5 Cans of beer, 2 Shots of  "liquor per week     Comment: Drinking casually on weekends; not a daily drinker    Drug use: Never    Sexual activity: Yes     Partners: Female     Birth control/protection: None   Other Topics Concern    Not on file   Social History Narrative    ** Merged History Encounter **          Social Determinants of Health     Financial Resource Strain: Not on file   Food Insecurity: Not on file   Transportation Needs: Not on file   Physical Activity: Not on file   Stress: Not on file   Social Connections: Not on file   Intimate Partner Violence: Not on file   Housing Stability: Not on file       Objective   BP (!) 180/106   Pulse 68   Temp 97.8 °F (36.6 °C)   Resp 20   Ht 5' 7\" (1.702 m)   Wt 80.5 kg (177 lb 6.4 oz)   SpO2 99%   BMI 27.78 kg/m²      Physical Exam     Physical Exam  Vitals and nursing note reviewed.   Constitutional:       General: He is not in acute distress.     Appearance: He is well-developed. He is not diaphoretic.   HENT:      Head: Normocephalic and atraumatic.      Right Ear: External ear normal.      Left Ear: External ear normal.      Nose: Nose normal.      Mouth/Throat:      Mouth: Mucous membranes are moist.      Pharynx: No oropharyngeal exudate or posterior oropharyngeal erythema.   Eyes:      General: No scleral icterus.        Right eye: No discharge.         Left eye: No discharge.      Conjunctiva/sclera: Conjunctivae normal.      Pupils: Pupils are equal, round, and reactive to light.   Cardiovascular:      Rate and Rhythm: Normal rate and regular rhythm.      Heart sounds: Normal heart sounds. No murmur heard.     No friction rub. No gallop.   Pulmonary:      Effort: Pulmonary effort is normal. No respiratory distress.      Breath sounds: Normal breath sounds. No decreased breath sounds, wheezing, rhonchi or rales.   Skin:     General: Skin is warm and dry.      Coloration: Skin is not pale.      Findings: No erythema or rash.   Neurological:      Mental Status: He is alert and " oriented to person, place, and time.   Psychiatric:         Behavior: Behavior normal.         Thought Content: Thought content normal.         Judgment: Judgment normal.         Tricia Lopez PA-C

## 2024-06-09 NOTE — Clinical Note
Vinod Haddad was seen and treated in our emergency department on 6/9/2024.                Diagnosis:     Vinod  .    He may return on this date: 06/11/2024         If you have any questions or concerns, please don't hesitate to call.      Facundo Lau PA-C    ______________________________           _______________          _______________  Hospital Representative                              Date                                Time

## 2024-06-11 ENCOUNTER — HOSPITAL ENCOUNTER (OUTPATIENT)
Dept: CT IMAGING | Facility: HOSPITAL | Age: 34
Discharge: HOME | End: 2024-06-11
Payer: COMMERCIAL

## 2024-06-11 DIAGNOSIS — S02.19XD CLOSED FRACTURE OF TEMPORAL BONE WITH ROUTINE HEALING, SUBSEQUENT ENCOUNTER: ICD-10-CM

## 2024-06-11 PROCEDURE — 70480 CT ORBIT/EAR/FOSSA W/O DYE: CPT

## 2024-06-12 ENCOUNTER — OFFICE VISIT (OUTPATIENT)
Dept: FAMILY MEDICINE CLINIC | Facility: CLINIC | Age: 34
End: 2024-06-12
Payer: COMMERCIAL

## 2024-06-12 VITALS
DIASTOLIC BLOOD PRESSURE: 82 MMHG | RESPIRATION RATE: 16 BRPM | HEIGHT: 67 IN | WEIGHT: 176.6 LBS | BODY MASS INDEX: 27.72 KG/M2 | HEART RATE: 74 BPM | SYSTOLIC BLOOD PRESSURE: 126 MMHG | OXYGEN SATURATION: 99 % | TEMPERATURE: 97 F

## 2024-06-12 DIAGNOSIS — D70.9 NEUTROPENIA, UNSPECIFIED TYPE (HCC): ICD-10-CM

## 2024-06-12 DIAGNOSIS — I20.1 CORONARY VASOSPASM (HCC): ICD-10-CM

## 2024-06-12 DIAGNOSIS — R07.9 CHEST PAIN, UNSPECIFIED TYPE: Primary | ICD-10-CM

## 2024-06-12 DIAGNOSIS — R79.89 ELEVATED SERUM CREATININE: ICD-10-CM

## 2024-06-12 DIAGNOSIS — R03.0 ELEVATED BP WITHOUT DIAGNOSIS OF HYPERTENSION: ICD-10-CM

## 2024-06-12 DIAGNOSIS — E78.2 MIXED HYPERLIPIDEMIA: ICD-10-CM

## 2024-06-12 PROBLEM — R17 ELEVATED BILIRUBIN: Status: ACTIVE | Noted: 2024-01-23

## 2024-06-12 PROBLEM — J36 PERITONSILLAR ABSCESS: Status: RESOLVED | Noted: 2024-01-23 | Resolved: 2024-06-12

## 2024-06-12 PROCEDURE — 99214 OFFICE O/P EST MOD 30 MIN: CPT | Performed by: PHYSICIAN ASSISTANT

## 2024-06-12 PROCEDURE — 93000 ELECTROCARDIOGRAM COMPLETE: CPT | Performed by: PHYSICIAN ASSISTANT

## 2024-06-12 RX ORDER — DILTIAZEM HYDROCHLORIDE 180 MG/1
180 CAPSULE, COATED, EXTENDED RELEASE ORAL DAILY
Qty: 30 CAPSULE | Refills: 5 | Status: SHIPPED | OUTPATIENT
Start: 2024-06-12 | End: 2024-12-09

## 2024-06-12 NOTE — ASSESSMENT & PLAN NOTE
Lab Results   Component Value Date    CHOLESTEROL 208 (H) 04/23/2024    CHOLESTEROL 252 (H) 04/11/2023    CHOLESTEROL 135 11/11/2021     Lab Results   Component Value Date    HDL 40 04/23/2024    HDL 50 04/11/2023    HDL 39 (L) 11/11/2021     Lab Results   Component Value Date    TRIG 232 (H) 04/23/2024    TRIG 178 (H) 04/11/2023    TRIG 131 11/11/2021     Lab Results   Component Value Date    NONHDLC 202 04/11/2023    NONHDLC 96 11/11/2021    NONHDLC 192 06/01/2021     Lab Results   Component Value Date    LDLCALC 122 (H) 04/23/2024     Consider starting statin due to history of NSTEMI.

## 2024-06-12 NOTE — ASSESSMENT & PLAN NOTE
Lab Results   Component Value Date    SODIUM 137 06/09/2024    K 4.1 06/09/2024     06/09/2024    CO2 30 06/09/2024    AGAP 4 06/09/2024    BUN 16 06/09/2024    CREATININE 1.34 (H) 06/09/2024    GLUC 85 06/09/2024    GLUF 98 04/11/2023    CALCIUM 9.6 06/09/2024    AST 23 06/09/2024    ALT 29 06/09/2024    ALKPHOS 75 06/09/2024    TP 7.9 06/09/2024    TBILI 0.99 06/09/2024    EGFR 68 06/09/2024     Repeat BMP in 1 week.  Recommend maintain hydration 64 ounces daily.

## 2024-06-12 NOTE — ASSESSMENT & PLAN NOTE
Initially presented to ED with headache on 6/9/2024.  Headache has since resolved but blood pressure remained elevated until Monday, max systolic 160s/100s.  BP has improved since, stable on exam today.  Will start diltiazem due to history of coronary vasospasm.  For back to cardiology.  Rare social alcohol use, none recently.  Drinks Cels[employer] energy drink infrequently as well.  Recent stress with moving.

## 2024-06-12 NOTE — ASSESSMENT & PLAN NOTE
Lab Results   Component Value Date    WBC 3.79 (L) 06/09/2024    HGB 16.4 06/09/2024    HCT 45.4 06/09/2024    MCV 86 06/09/2024     06/09/2024     Repeat lab.

## 2024-06-12 NOTE — PROGRESS NOTES
Ambulatory Visit  Name: Vinod Haddad      : 1990      MRN: [id number]  Encounter Provider: Purnima Andre PA-C  Encounter Date: 2024   Encounter department: [facility] C[facility]    Assessment & Plan   1. Chest pain, unspecified type  Assessment & Plan:  Chest pressure on exam, normal sinus rhythm without any ST changes per ECG on exam today.  Negative troponins done in ED on 2024.  Referred to cardiology and check stress test.  Proceed to the ER for new or worsening.  Orders:  -     POCT ECG  -     Ambulatory Referral to Cardiology; Future  -     diltiazem (CARDIZEM CD) 180 mg 24 hr capsule; Take 1 capsule (180 mg total) by mouth daily  -     Stress test only, exercise; Future; Expected date: 2024  -     TSH, 3rd generation with Free T4 reflex; Future; Expected date: 2024  2. Coronary vasospasm (HCC)  Assessment & Plan:  History of NSTEMI in 2019.  Was started on diltiazem at that time.  Discontinued in .  Cardiac cath showed no CAD but with vasospasm.  Referred back to cardiology.  Restart Cardizem at lower dose 180 mg once daily.  Orders:  -     Ambulatory Referral to Cardiology; Future  -     TSH, 3rd generation with Free T4 reflex; Future; Expected date: 2024  3. Elevated BP without diagnosis of hypertension  Assessment & Plan:  Initially presented to ED with headache on 2024.  Headache has since resolved but blood pressure remained elevated until Monday, max systolic 160s/100s.  BP has improved since, stable on exam today.  Will start diltiazem due to history of coronary vasospasm.  For back to cardiology.  Rare social alcohol use, none recently.  Drinks Celsius energy drink infrequently as well.  Recent stress with moving.  Orders:  -     TSH, 3rd generation with Free T4 reflex; Future; Expected date: 2024  4. Mixed hyperlipidemia  Assessment & Plan:  Lab Results   Component Value Date    CHOLESTEROL 208 (H) 2024     CHOLESTEROL 252 (H) 04/11/2023    CHOLESTEROL 135 11/11/2021     Lab Results   Component Value Date    HDL 40 04/23/2024    HDL 50 04/11/2023    HDL 39 (L) 11/11/2021     Lab Results   Component Value Date    TRIG 232 (H) 04/23/2024    TRIG 178 (H) 04/11/2023    TRIG 131 11/11/2021     Lab Results   Component Value Date    NONHDLC 202 04/11/2023    NONHDLC 96 11/11/2021    NONHDLC 192 06/01/2021     Lab Results   Component Value Date    LDLCALC 122 (H) 04/23/2024     Consider starting statin due to history of NSTEMI.  5. Elevated serum creatinine  Assessment & Plan:  Lab Results   Component Value Date    SODIUM 137 06/09/2024    K 4.1 06/09/2024     06/09/2024    CO2 30 06/09/2024    AGAP 4 06/09/2024    BUN 16 06/09/2024    CREATININE 1.34 (H) 06/09/2024    GLUC 85 06/09/2024    GLUF 98 04/11/2023    CALCIUM 9.6 06/09/2024    AST 23 06/09/2024    ALT 29 06/09/2024    ALKPHOS 75 06/09/2024    TP 7.9 06/09/2024    TBILI 0.99 06/09/2024    EGFR 68 06/09/2024     Repeat BMP in 1 week.  Recommend maintain hydration 64 ounces daily.  Orders:  -     Basic metabolic panel; Future  6. Neutropenia, unspecified type (HCC)  Assessment & Plan:  Lab Results   Component Value Date    WBC 3.79 (L) 06/09/2024    HGB 16.4 06/09/2024    HCT 45.4 06/09/2024    MCV 86 06/09/2024     06/09/2024     Repeat lab.  Orders:  -     CBC and differential; Future; Expected date: 06/12/2024       History of Present Illness   {Disappearing Hyperlinks I Encounters * My Last Note * Since Last Visit * History :34884}  Vinod is a 34 y.o. male with a h/o NSTEMI and SAH who presents as an ED follow-up with chest pain.  Had headache which initially started on Friday.  Took some Tylenol and went to urgent care on Saturday.  Urgent care sent to the ED where he had negative CT head and negative troponin.  BP was high 160s.  Checked again on Monday and was also high.  Headache had since resolved.  Did not check since then.  Start with chest  "pressure today.  Different than previous chest pain which caused numbness across arm.  Has been moving recently and stress with work.  Otherwise no anxiety or panic attacks.  No drug use.  No smoking.  Last alcohol use a week ago.  Drinks Celsius but not daily.    Chest Pain   Pertinent negatives include no cough, fever, headaches (resolved), numbness, palpitations or shortness of breath.       Review of Systems   Constitutional:  Negative for chills, fever and unexpected weight change.   Respiratory:  Negative for cough and shortness of breath.    Cardiovascular:  Positive for chest pain. Negative for palpitations.   Neurological:  Negative for light-headedness, numbness and headaches (resolved).       Objective   {Disappearing Hyperlinks   Review Vitals * Enter New Vitals * Results Review * Labs * Imaging * Cardiology * Procedures * Lung Cancer Screening :72057}  /82 (BP Location: Right arm, Patient Position: Sitting, Cuff Size: Large)   Pulse 74   Temp (!) 97 °F (36.1 °C) (Tympanic)   Resp 16   Ht 5' 7\" (1.702 m)   Wt 80.1 kg (176 lb 9.6 oz)   SpO2 99%   BMI 27.66 kg/m²     Physical Exam  Vitals reviewed.   Constitutional:       Appearance: Normal appearance.   HENT:      Head: Normocephalic and atraumatic.   Cardiovascular:      Rate and Rhythm: Normal rate and regular rhythm.      Heart sounds: Normal heart sounds.   Pulmonary:      Effort: Pulmonary effort is normal.      Breath sounds: Normal breath sounds.   Neurological:      Mental Status: He is alert and oriented to person, place, and time. Mental status is at baseline.       Administrative Statements {Disappearing Hyperlinks I  Level of Service * [facility]/\[facility] [place] [facility]\""P:[id number]}          "

## 2024-06-12 NOTE — ASSESSMENT & PLAN NOTE
Chest pressure on exam, normal sinus rhythm without any ST changes per ECG on exam today.  Negative troponins done in ED on 6/9/2024.  Referred to cardiology and check stress test.  Proceed to the ER for new or worsening.

## 2024-06-12 NOTE — ASSESSMENT & PLAN NOTE
History of NSTEMI in 2019.  Was started on diltiazem at that time.  Discontinued in 2021.  Cardiac cath showed no CAD but with vasospasm.  Referred back to cardiology.  Restart Cardizem at lower dose 180 mg once daily.

## 2024-06-18 ENCOUNTER — APPOINTMENT (OUTPATIENT)
Dept: LAB | Facility: HOSPITAL | Age: 34
End: 2024-06-18
Payer: COMMERCIAL

## 2024-06-18 DIAGNOSIS — R03.0 ELEVATED BP WITHOUT DIAGNOSIS OF HYPERTENSION: ICD-10-CM

## 2024-06-18 DIAGNOSIS — I20.1 CORONARY VASOSPASM (HCC): ICD-10-CM

## 2024-06-18 DIAGNOSIS — R79.89 ELEVATED SERUM CREATININE: ICD-10-CM

## 2024-06-18 DIAGNOSIS — D70.9 NEUTROPENIA, UNSPECIFIED TYPE (HCC): ICD-10-CM

## 2024-06-18 DIAGNOSIS — R07.9 CHEST PAIN, UNSPECIFIED TYPE: ICD-10-CM

## 2024-06-18 LAB
ANION GAP SERPL CALCULATED.3IONS-SCNC: 6 MMOL/L (ref 4–13)
BASOPHILS # BLD AUTO: 0.02 THOUSANDS/ÂΜL (ref 0–0.1)
BASOPHILS NFR BLD AUTO: 1 % (ref 0–1)
BUN SERPL-MCNC: 22 MG/DL (ref 5–25)
CALCIUM SERPL-MCNC: 9.5 MG/DL (ref 8.4–10.2)
CHLORIDE SERPL-SCNC: 105 MMOL/L (ref 96–108)
CO2 SERPL-SCNC: 26 MMOL/L (ref 21–32)
CREAT SERPL-MCNC: 1.36 MG/DL (ref 0.6–1.3)
EOSINOPHIL # BLD AUTO: 0.05 THOUSAND/ÂΜL (ref 0–0.61)
EOSINOPHIL NFR BLD AUTO: 2 % (ref 0–6)
ERYTHROCYTE [DISTWIDTH] IN BLOOD BY AUTOMATED COUNT: 12.1 % (ref 11.6–15.1)
GFR SERPL CREATININE-BSD FRML MDRD: 66 ML/MIN/1.73SQ M
GLUCOSE P FAST SERPL-MCNC: 95 MG/DL (ref 65–99)
HCT VFR BLD AUTO: 43.7 % (ref 36.5–49.3)
HGB BLD-MCNC: 15.7 G/DL (ref 12–17)
IMM GRANULOCYTES # BLD AUTO: 0.01 THOUSAND/UL (ref 0–0.2)
IMM GRANULOCYTES NFR BLD AUTO: 0 % (ref 0–2)
LYMPHOCYTES # BLD AUTO: 1.76 THOUSANDS/ÂΜL (ref 0.6–4.47)
LYMPHOCYTES NFR BLD AUTO: 57 % (ref 14–44)
MCH RBC QN AUTO: 31.5 PG (ref 26.8–34.3)
MCHC RBC AUTO-ENTMCNC: 35.9 G/DL (ref 31.4–37.4)
MCV RBC AUTO: 88 FL (ref 82–98)
MONOCYTES # BLD AUTO: 0.2 THOUSAND/ÂΜL (ref 0.17–1.22)
MONOCYTES NFR BLD AUTO: 7 % (ref 4–12)
NEUTROPHILS # BLD AUTO: 1.02 THOUSANDS/ÂΜL (ref 1.85–7.62)
NEUTS SEG NFR BLD AUTO: 33 % (ref 43–75)
NRBC BLD AUTO-RTO: 0 /100 WBCS
PLATELET # BLD AUTO: 263 THOUSANDS/UL (ref 149–390)
PMV BLD AUTO: 10.2 FL (ref 8.9–12.7)
POTASSIUM SERPL-SCNC: 4.1 MMOL/L (ref 3.5–5.3)
RBC # BLD AUTO: 4.99 MILLION/UL (ref 3.88–5.62)
SODIUM SERPL-SCNC: 137 MMOL/L (ref 135–147)
TSH SERPL DL<=0.05 MIU/L-ACNC: 1.55 UIU/ML (ref 0.45–4.5)
WBC # BLD AUTO: 3.06 THOUSAND/UL (ref 4.31–10.16)

## 2024-06-18 PROCEDURE — 84443 ASSAY THYROID STIM HORMONE: CPT

## 2024-06-18 PROCEDURE — 85025 COMPLETE CBC W/AUTO DIFF WBC: CPT

## 2024-06-18 PROCEDURE — 36415 COLL VENOUS BLD VENIPUNCTURE: CPT

## 2024-06-18 PROCEDURE — 80048 BASIC METABOLIC PNL TOTAL CA: CPT

## 2024-06-24 DIAGNOSIS — D70.8 OTHER NEUTROPENIA (HCC): Primary | ICD-10-CM

## 2024-06-25 ENCOUNTER — HOSPITAL ENCOUNTER (OUTPATIENT)
Dept: NON INVASIVE DIAGNOSTICS | Facility: CLINIC | Age: 34
Discharge: HOME | End: 2024-06-25
Payer: COMMERCIAL

## 2024-06-25 VITALS
HEART RATE: 74 BPM | OXYGEN SATURATION: 98 % | HEIGHT: 67 IN | BODY MASS INDEX: 27.62 KG/M2 | SYSTOLIC BLOOD PRESSURE: 144 MMHG | DIASTOLIC BLOOD PRESSURE: 92 MMHG | WEIGHT: 176 LBS

## 2024-06-25 DIAGNOSIS — R07.9 CHEST PAIN, UNSPECIFIED TYPE: ICD-10-CM

## 2024-06-25 DIAGNOSIS — R79.89 ELEVATED SERUM CREATININE: Primary | ICD-10-CM

## 2024-06-25 LAB
MAX HR PERCENT: 104 %
MAX HR: 193 BPM
RATE PRESSURE PRODUCT: NORMAL
SL CV STRESS RECOVERY BP: NORMAL MMHG
SL CV STRESS RECOVERY HR: 110 BPM
SL CV STRESS RECOVERY O2 SAT: 96 %
SL CV STRESS STAGE REACHED: 5
STRESS ANGINA INDEX: 0
STRESS BASELINE BP: NORMAL MMHG
STRESS BASELINE HR: 74 BPM
STRESS O2 SAT REST: 98 %
STRESS PEAK HR: 193 BPM
STRESS POST ESTIMATED WORKLOAD: 17.2 METS
STRESS POST EXERCISE DUR MIN: 14 MIN
STRESS POST EXERCISE DUR SEC: 0 SEC
STRESS POST O2 SAT PEAK: 95 %
STRESS POST PEAK BP: 220 MMHG

## 2024-06-25 PROCEDURE — 93017 CV STRESS TEST TRACING ONLY: CPT

## 2024-06-25 PROCEDURE — 93016 CV STRESS TEST SUPVJ ONLY: CPT | Performed by: INTERNAL MEDICINE

## 2024-06-25 PROCEDURE — 93018 CV STRESS TEST I&R ONLY: CPT | Performed by: INTERNAL MEDICINE

## 2024-06-26 LAB
CHEST PAIN STATEMENT: NORMAL
MAX DIASTOLIC BP: 60 MMHG
MAX PREDICTED HEART RATE: 185 BPM
PROTOCOL NAME: NORMAL
STRESS POST EXERCISE DUR MIN: 14 MIN
STRESS POST EXERCISE DUR SEC: 0 SEC
STRESS POST PEAK HR: 193 BPM
STRESS POST PEAK SYSTOLIC BP: 220 MMHG
TARGET HR FORMULA: NORMAL
TEST INDICATION: NORMAL

## 2024-07-18 ENCOUNTER — CONSULT (OUTPATIENT)
Dept: CARDIOLOGY CLINIC | Facility: CLINIC | Age: 34
End: 2024-07-18
Payer: COMMERCIAL

## 2024-07-18 VITALS
HEART RATE: 76 BPM | SYSTOLIC BLOOD PRESSURE: 128 MMHG | HEIGHT: 67 IN | WEIGHT: 177 LBS | OXYGEN SATURATION: 98 % | BODY MASS INDEX: 27.78 KG/M2 | DIASTOLIC BLOOD PRESSURE: 88 MMHG

## 2024-07-18 DIAGNOSIS — I10 PRIMARY HYPERTENSION: Primary | ICD-10-CM

## 2024-07-18 DIAGNOSIS — R07.9 CHEST PAIN, UNSPECIFIED TYPE: ICD-10-CM

## 2024-07-18 DIAGNOSIS — I20.1 CORONARY VASOSPASM (HCC): ICD-10-CM

## 2024-07-18 DIAGNOSIS — I11.9 HYPERTENSIVE HEART DISEASE WITHOUT HEART FAILURE: ICD-10-CM

## 2024-07-18 DIAGNOSIS — E78.2 MIXED HYPERLIPIDEMIA: ICD-10-CM

## 2024-07-18 PROCEDURE — 99204 OFFICE O/P NEW MOD 45 MIN: CPT | Performed by: INTERNAL MEDICINE

## 2024-07-18 RX ORDER — AMLODIPINE BESYLATE 10 MG/1
10 TABLET ORAL DAILY
Qty: 90 TABLET | Refills: 3 | Status: SHIPPED | OUTPATIENT
Start: 2024-07-18

## 2024-07-18 RX ORDER — ATORVASTATIN CALCIUM 20 MG/1
20 TABLET, FILM COATED ORAL DAILY
Qty: 90 TABLET | Refills: 3 | Status: SHIPPED | OUTPATIENT
Start: 2024-07-18

## 2024-07-18 RX ORDER — METOPROLOL SUCCINATE 25 MG/1
25 TABLET, EXTENDED RELEASE ORAL DAILY
Qty: 90 TABLET | Refills: 3 | Status: SHIPPED | OUTPATIENT
Start: 2024-07-18

## 2024-07-18 NOTE — PROGRESS NOTES
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 no significant regurgitation.    PULMONIC VALVE: Leaflets exhibited normal thickness, no calcification, and normal cuspal separation. DOPPLER: The transpulmonic velocity was within the normal range. There was no significant regurgitation.    PERICARDIUM: There was no pericardial effusion. The pericardium was normal in appearance.    AORTA: The root exhibited normal size.    SYSTEMIC VEINS: IVC: The inferior vena cava was normal in size.    SYSTEM MEASUREMENT TABLES    2D  %FS: 27.1 %  Ao Diam: 2.6 cm  EDV(Teich): 131.5 ml  EF(Teich): 52.5 %  ESV(Teich): 62.5 ml  IVSd: 1 cm  LA Area: 12.4 cm2  LA Diam: 3.5 cm  LVEDV MOD A4C: 98 ml  LVEF MOD A4C: 62.3 %  LVESV MOD A4C: 37 ml  LVIDd: 5.2 cm  LVIDs: 3.8 cm  LVLd A4C: 8.2 cm  LVLs A4C: 6.4 cm  LVPWd: 0.7 cm  RA Area: 12.8 cm2  RVIDd: 3 cm  SV MOD A4C: 61.1 ml  SV(Teich): 69 ml    MM  TAPSE: 1.9 cm    PW  E': 0.1 m/s  E/E': 9.6  MV A Markus: 0.6 m/s  MV Dec [place]: 2.4 m/s2  MV DecT: 258.2 ms  MV E Markus: 0.6 m/s  MV E/A Ratio: 0.9  MV PHT: 74.9 ms  MVA By PHT: 2.9 cm2    [employer] Accredited Echocardiography Laboratory    Prepared and electronically signed by    Kyrie Matamoros MD  Signed 07-Aug-2019 13:08:36    No  results found for this or any previous visit.    No results found for this or any previous visit.    No results found for this or any previous visit.       *-*-*-*-*-*-*-*-*-*-*-*-*-*-*-*-*-*-*-*-*-*-*-*-*-*-*-*-*-*-*-*-*-*-*-*-*-*-*-*-*-*-*-*-*-*-*-*-*-*-*-*-*-*-  SIGNATURES:   @SIG@   Carlos Merchant MD     CC:   MD Jes Vogt Tiffany, PA-C

## 2024-07-18 NOTE — ASSESSMENT & PLAN NOTE
----- Message from Patti Prabhakar MD sent at 12/22/2017  7:52 AM CST -----  Please book in at 8 am on 1/15 for well visit   Mom is aware   Mr. Vinod Haddad has been dealing with elevated blood pressures recently.  He did have some chest discomfort earlier but this has resolved.  He did very well on the stress treadmill stress test with no changes concerning for ischemia.  He had normal coronary arteries on cardiac catheterization in 2019.  Physical examination is overall unremarkable  With no evidence of heart failure or significant valvular heart disease.  His last blood work from June indicated slightly increasing creatinine at 1.36 GFR was normal.  His lipids are abnormal.  He was previously on atorvastatin but has discontinued this medication for unclear reason.    Eating Heart-Healthy Food: Using the DASH Plan        Eating for your heart doesn’t have to be hard or boring. You just need to know how to make healthier choices. The DASH eating plan has been developed to help you do just that. DASH stands for Dietary Approaches to Stop Hypertension. It is a plan that has been proven to be healthier for your heart and to lower your risk for high blood pressure. It can also help lower your risk for cancer, heart disease, osteoporosis, and diabetes.  Choosing from each food group  Choose foods from each of the food groups below each day. Try to get the recommended number of servings for each food group. The serving numbers are based on a diet of 2,000 calories a day. Talk to your doctor if you’re unsure about your calorie needs. Along with getting the correct servings, the DASH plan also recommends a sodium intake less than 2,300 mg per day.     Grains  Servings: 6 to 8 a day  A serving is:  · 1 slice bread  · 1 ounce dry cereal  · Half a cup cooked rice, pasta or cereal  Best choices: Whole grains and any grains high in fiber. Vegetables  Servings: 4 to 5 a day  A serving is:  · 1 cup raw leafy vegetable  · Half a cup cut-up raw or cooked vegetable  · Half a cup vegetable juice  Best choices: Fresh or frozen vegetables prepared without added salt  or fat.   Fruits  Servings: 4 to 5 a day  A serving is:  · 1 medium fruit  · One-quarter cup dried fruit  · Half a cup fresh, frozen, or canned fruit  · Half a cup of 100% fruit juices  Best choices: A variety of fresh fruits of different colors. Whole fruits are a better choice than fruit juices. Low-fat or fat-free dairy  Servings: 2 to 3 a day  A serving is:  · 1 cup milk  · 1 cup yogurt  · One and a half ounces cheese  Best choices: Skim or 1% milk, low-fat or fat-free yogurt or buttermilk, and low-fat cheeses.         Lean meats, poultry, fish  Servings: 6 or fewer a day  A serving is:  · 1 ounce cooked meats, poultry, or fish  · 1 egg  Best choices: Lean poultry and fish. Trim away visible fat. Broil, grill, roast, or boil instead of frying. Remove skin from poultry before eating. Limit how much red meat you eat.  Nuts, seeds, beans  Servings: 4 to 5 a week  A serving is:  · One-third cup nuts (one and a half ounces)  · 2 tablespoons nut butter or seeds  · Half a cup cooked dry beans or legumes  Best choices: Dry roasted nuts with no salt added, lentils, kidney beans, garbanzo beans, and whole small beans.   Fats and oils  Servings: 2 to 3 a day  A serving is:  · 1 teaspoon vegetable oil  · 1 teaspoon soft margarine  · 1 tablespoon mayonnaise  · 2 tablespoons salad dressing  Best choices: Nut and vegetable oils (nontropical vegetable oils), such as olive and canola oil. Sweets  Servings: 5 a week or fewer  A serving is:  · 1 tablespoon sugar, maple syrup, or honey  · 1 tablespoon jam or jelly  · 1 half-ounce jelly beans (about 15)  · 1 cup lemonade  Best choices: Dried fruit can be a satisfying sweet. Choose low-fat sweets. And watch your serving sizes!      For more on the DASH eating plan, visit:  www.nhlbi.nih.gov/health/health-topics/topics/dash   Date Last Reviewed: 6/1/2016  © [id number] [employer]). [address] [place] [address], [place] [address]. All rights reserved. This information is not  intended as a substitute for professional medical care. Always follow your healthcare professional's instructions.          At this time I am advising the following:  -- I am discontinuing diltiazem and beginning him on amlodipine 10 mg daily to be taken in the morning.  I am advising him to take metoprolol succinate 25 mg in the afternoon or evening.  -- I am providing him with a blood pressure tracking sheet with instructions to record random blood pressures.  -- Will get an echocardiogram to screen for hypertensive heart disease.  Our goal is to Bring his systolic blood pressures consistently less than 130 mmHg and diastolic blood pressures less than 80 mmHg.  If the blood pressures are elevated despite these changes then we will consider adding nitrates to his medications.  -- I am advising him to avoid added salt in his diet and continue normal activities including exercise.

## 2024-07-18 NOTE — PATIENT INSTRUCTIONS
CARDIOLOGY ASSESSMENT & PLAN:   Diagnosis ICD-10-CM Associated Orders   1. Primary hypertension  I10 amLODIPine (NORVASC) 10 mg tablet     metoprolol succinate (TOPROL-XL) 25 mg 24 hr tablet     Echo complete w/ contrast if indicated      2. Coronary vasospasm (HCC)  I20.1 Ambulatory Referral to Cardiology      3. Chest pain, unspecified type  R07.9 Ambulatory Referral to Cardiology      4. Mixed hyperlipidemia  E78.2 atorvastatin (LIPITOR) 20 mg tablet      5. Hypertensive heart disease without heart failure  I11.9 Echo complete w/ contrast if indicated        1. Primary hypertension  Assessment & Plan:  Mr. Vinod Haddad has been dealing with elevated blood pressures recently.  He did have some chest discomfort earlier but this has resolved.  He did very well on the stress treadmill stress test with no changes concerning for ischemia.  He had normal coronary arteries on cardiac catheterization in 2019.  Physical examination is overall unremarkable  With no evidence of heart failure or significant valvular heart disease.  His last blood work from June indicated slightly increasing creatinine at 1.36 GFR was normal.  His lipids are abnormal.  He was previously on atorvastatin but has discontinued this medication for unclear reason.    Eating Heart-Healthy Food: Using the DASH Plan        Eating for your heart doesn’t have to be hard or boring. You just need to know how to make healthier choices. The DASH eating plan has been developed to help you do just that. DASH stands for Dietary Approaches to Stop Hypertension. It is a plan that has been proven to be healthier for your heart and to lower your risk for high blood pressure. It can also help lower your risk for cancer, heart disease, osteoporosis, and diabetes.  Choosing from each food group  Choose foods from each of the food groups below each day. Try to get the recommended number of servings for each food group. The serving numbers are based on a diet of  2,000 calories a day. Talk to your doctor if you’re unsure about your calorie needs. Along with getting the correct servings, the DASH plan also recommends a sodium intake less than 2,300 mg per day.     Grains  Servings: 6 to 8 a day  A serving is:  · 1 slice bread  · 1 ounce dry cereal  · Half a cup cooked rice, pasta or cereal  Best choices: Whole grains and any grains high in fiber. Vegetables  Servings: 4 to 5 a day  A serving is:  · 1 cup raw leafy vegetable  · Half a cup cut-up raw or cooked vegetable  · Half a cup vegetable juice  Best choices: Fresh or frozen vegetables prepared without added salt or fat.   Fruits  Servings: 4 to 5 a day  A serving is:  · 1 medium fruit  · One-quarter cup dried fruit  · Half a cup fresh, frozen, or canned fruit  · Half a cup of 100% fruit juices  Best choices: A variety of fresh fruits of different colors. Whole fruits are a better choice than fruit juices. Low-fat or fat-free dairy  Servings: 2 to 3 a day  A serving is:  · 1 cup milk  · 1 cup yogurt  · One and a half ounces cheese  Best choices: Skim or 1% milk, low-fat or fat-free yogurt or buttermilk, and low-fat cheeses.         Lean meats, poultry, fish  Servings: 6 or fewer a day  A serving is:  · 1 ounce cooked meats, poultry, or fish  · 1 egg  Best choices: Lean poultry and fish. Trim away visible fat. Broil, grill, roast, or boil instead of frying. Remove skin from poultry before eating. Limit how much red meat you eat.  Nuts, seeds, beans  Servings: 4 to 5 a week  A serving is:  · One-third cup nuts (one and a half ounces)  · 2 tablespoons nut butter or seeds  · Half a cup cooked dry beans or legumes  Best choices: Dry roasted nuts with no salt added, lentils, kidney beans, garbanzo beans, and whole small beans.   Fats and oils  Servings: 2 to 3 a day  A serving is:  · 1 teaspoon vegetable oil  · 1 teaspoon soft margarine  · 1 tablespoon mayonnaise  · 2 tablespoons salad dressing  Best choices: Nut and vegetable  oils (nontropical vegetable oils), such as olive and canola oil. Sweets  Servings: 5 a week or fewer  A serving is:  · 1 tablespoon sugar, maple syrup, or honey  · 1 tablespoon jam or jelly  · 1 half-ounce jelly beans (about 15)  · 1 cup lemonade  Best choices: Dried fruit can be a satisfying sweet. Choose low-fat sweets. And watch your serving sizes!      For more on the DASH eating plan, visit:  www.nhlbi.nih.gov/health/health-topics/topics/dash   Date Last Reviewed: 6/1/2016 © 2000-2018 [employer]. [address] [place] [address], [place] [address]. All rights reserved. This information is not intended as a substitute for professional medical care. Always follow your healthcare professional's instructions.          At this time I am advising the following:  -- I am discontinuing diltiazem and beginning him on amlodipine 10 mg daily to be taken in the morning.  I am advising him to take metoprolol succinate 25 mg in the afternoon or evening.  -- I am providing him with a blood pressure tracking sheet with instructions to record random blood pressures.  -- Will get an echocardiogram to screen for hypertensive heart disease.  Our goal is to Bring his systolic blood pressures consistently less than 130 mmHg and diastolic blood pressures less than 80 mmHg.  If the blood pressures are elevated despite these changes then we will consider adding nitrates to his medications.  -- I am advising him to avoid added salt in his diet and continue normal activities including exercise.                       Orders:  -     amLODIPine (NORVASC) 10 mg tablet; Take 1 tablet (10 mg total) by mouth daily  -     metoprolol succinate (TOPROL-XL) 25 mg 24 hr tablet; Take 1 tablet (25 mg total) by mouth daily  -     Echo complete w/ contrast if indicated; Future; Expected date: 07/18/2024  2. Coronary vasospasm (HCC)  -     Ambulatory Referral to Cardiology  3. Chest pain, unspecified type  -     Ambulatory Referral to  Cardiology  4. Mixed hyperlipidemia  -     atorvastatin (LIPITOR) 20 mg tablet; Take 1 tablet (20 mg total) by mouth daily  5. Hypertensive heart disease without heart failure  -     Echo complete w/ contrast if indicated; Future; Expected date: 07/18/2024

## 2024-07-23 ENCOUNTER — OFFICE VISIT (OUTPATIENT)
Dept: FAMILY MEDICINE CLINIC | Facility: CLINIC | Age: 34
End: 2024-07-23
Payer: COMMERCIAL

## 2024-07-23 VITALS
TEMPERATURE: 97.8 F | DIASTOLIC BLOOD PRESSURE: 80 MMHG | BODY MASS INDEX: 27.78 KG/M2 | HEIGHT: 67 IN | RESPIRATION RATE: 16 BRPM | SYSTOLIC BLOOD PRESSURE: 130 MMHG | WEIGHT: 177 LBS | HEART RATE: 72 BPM | OXYGEN SATURATION: 98 %

## 2024-07-23 DIAGNOSIS — I10 PRIMARY HYPERTENSION: Primary | ICD-10-CM

## 2024-07-23 PROCEDURE — 99213 OFFICE O/P EST LOW 20 MIN: CPT | Performed by: FAMILY MEDICINE

## 2024-07-23 NOTE — PROGRESS NOTES
Name: Vinod Haddad      : 1990      MRN: [id number]  Encounter Provider: Petr De La Garza MD  Encounter Date: 2024   Encounter department: [facility] PRIMARY CARE [facility]   Hypertension Management    Chief Complaint:  Follow-up for hypertension management.    History of Present Illness:  The patient is a 34-year-old male with a known history of hypertension. He presents today for a follow-up appointment. The patient reports adherence to his prescribed antihypertensive medication regimen. He denies any new symptoms such as chest pain, shortness of breath, headaches, or dizziness. There have been no recent changes in his lifestyle or diet. The patient is concerned about a discrepancy in a letter regarding his weight, which mentions conflicting values of 25 pounds and 10 pounds. He seeks clarification on this matter.      Current Outpatient Medications:     amLODIPine (NORVASC) 10 mg tablet, Take 1 tablet (10 mg total) by mouth daily, Disp: 90 tablet, Rfl: 3    atorvastatin (LIPITOR) 20 mg tablet, Take 1 tablet (20 mg total) by mouth daily, Disp: 90 tablet, Rfl: 3    metoprolol succinate (TOPROL-XL) 25 mg 24 hr tablet, Take 1 tablet (25 mg total) by mouth daily, Disp: 90 tablet, Rfl: 3      Allergies   Allergen Reactions    No Known Allergies          Social History:  The patient denies tobacco use. He consumes alcohol occasionally and does not use recreational drugs. He works a sedentary job.    Family History:  Family history of hypertension in both parents.    Review of Systems:  Denies chest pain, shortness of breath, headaches, dizziness, palpitations, and any other acute symptoms.    Physical Exam:  General: Alert and oriented, in no acute distress.  Cardiovascular: Regular rate and rhythm, no murmurs, rubs, or gallops.  Respiratory: Clear to auscultation bilaterally, no wheezes, rales, or rhonchi.  Abdomen: Soft, non-tender, no organomegaly.  Extremities: No  edema, pulses are 2+ and equal bilaterally.    Assessment and Plan:  1. Hypertension: The patient is stable on current medication. Continue Lisinopril 10 mg daily. Encourage adherence to a low-sodium diet and regular physical activity. Follow up in 6 months for blood pressure monitoring.          Petr De La Garza MD   [facility] PRIMARY CARE [facility], [place]

## 2024-07-30 ENCOUNTER — OFFICE VISIT (OUTPATIENT)
Dept: HEMATOLOGY ONCOLOGY | Facility: MEDICAL CENTER | Age: 34
End: 2024-07-30
Payer: COMMERCIAL

## 2024-07-30 VITALS
BODY MASS INDEX: 27.94 KG/M2 | WEIGHT: 178 LBS | HEART RATE: 70 BPM | OXYGEN SATURATION: 97 % | DIASTOLIC BLOOD PRESSURE: 68 MMHG | RESPIRATION RATE: 15 BRPM | HEIGHT: 67 IN | TEMPERATURE: 97.6 F | SYSTOLIC BLOOD PRESSURE: 104 MMHG

## 2024-07-30 DIAGNOSIS — D70.8 OTHER NEUTROPENIA (HCC): ICD-10-CM

## 2024-07-30 DIAGNOSIS — D72.818 OTHER DECREASED WHITE BLOOD CELL (WBC) COUNT: Primary | ICD-10-CM

## 2024-07-30 PROBLEM — D72.819 LEUCOPENIA: Status: ACTIVE | Noted: 2024-07-30

## 2024-07-30 PROCEDURE — 99203 OFFICE O/P NEW LOW 30 MIN: CPT | Performed by: PHYSICIAN ASSISTANT

## 2024-07-30 PROCEDURE — 3078F DIAST BP <80 MM HG: CPT | Performed by: PHYSICIAN ASSISTANT

## 2024-07-30 PROCEDURE — 3074F SYST BP LT 130 MM HG: CPT | Performed by: PHYSICIAN ASSISTANT

## 2024-07-30 NOTE — PROGRESS NOTES
[facility] HEMATOLOGY ONCOLOGY SPECIALISTS [address] [id number]  Hematology Ambulatory Consult  Vinod Haddad, 1990, [id number]  7/30/2024      Assessment and Plan   1. Other neutropenia (HCC)    Patient with persistent leukopenia dating back to 4/2023.      Etiology is not completely clear.  We discussed that leukopenia can be secondary to medications, nutritional deficiency, chronic infections, autoimmune issues, rarely blood or bone marrow disorders.    Currently the patient feels well and has no evidence of B symptoms (night sweats, weight loss, fevers).  He has no frequent infections.      He denies history of IV drug abuse.    Will check additional lab work.    - CBC and differential; Future  - Chronic Hepatitis Panel; Future  - Comprehensive metabolic panel; Future  - Vitamin b12; Future  - C-reactive protein; Future  - Folate; Future  - HIV 1/2 AG/AB w Reflex [facility] for 2 yr old and above; Future  - Leukemia/Lymphoma flow cytometry; Future  - Sedimentation rate, automated; Future    The patient is scheduled for follow-up in approximately 6 weeks.     Patient voiced agreement and understanding to the above.   Patient knows to call the Hematology/Oncology office with any questions and concerns regarding the above.    Barrier(s) to care: None.   The patient is able to self care.    Subjective     Chief Complaint   Patient presents with    Consult       Referring provider    Purnima Andre PA-C  [address] [place] [id number]    History of present illness:   Patient presents for evaluation of leukopenia.    His past medical history is significant only for hypertension and hyperlipidemia.    He takes amlodipine, metoprolol, and a statin.  He just darted these medications a few weeks ago.    He has finding of persistent leukopenia dating back to April 2023.      He had lab work drawn most recently on 6/18/2024.  At that time  WBC count was 3.06, ANC 1.02, hemoglobin 15.7, hematocrit 43.7, MCV 88, platelets 263.    Patient denies frequent infections.  No frequent hospitalizations.    HIV testing in 2020 was negative.  Hepatitis C testing in 2021 was negative.    He denies history of IV drug abuse.    He denies any pertinent family history.  No family cancer history.    He says he had a bone marrow biopsy in 2012. He is unsure why.    He denies nausea, vomiting, bowel changes, chest pain, shortness of breath.  He denies B symptoms.  No unexplained weight loss, drenching night sweats, unexplained fevers.  No palpable adenopathy.    Review of Systems   Constitutional:  Negative for activity change, appetite change, fatigue and fever.   HENT:  Negative for nosebleeds.    Respiratory:  Negative for cough, choking and shortness of breath.    Cardiovascular:  Negative for chest pain, palpitations and leg swelling.   Gastrointestinal:  Negative for abdominal distention, abdominal pain, anal bleeding, blood in stool, constipation, diarrhea, nausea and vomiting.   Endocrine: Negative for cold intolerance.   Genitourinary:  Negative for hematuria.   Musculoskeletal:  Negative for myalgias.   Skin:  Negative for color change, pallor and rash.   Allergic/Immunologic: Negative for immunocompromised state.   Neurological:  Negative for headaches.   Hematological:  Negative for adenopathy. Does not bruise/bleed easily.   All other systems reviewed and are negative.      Past Medical History:   Diagnosis Date    CKD (chronic kidney disease) stage 2, GFR 60-89 ml/min     History of coronary vasospasm     History of subarachnoid hemorrhage 09/06/2020    Hyperlipidemia     Hypertension     Peritonsillar abscess 01/23/2024    Post concussion syndrome 09/15/2020    SAH (subarachnoid hemorrhage) (HCC) 08/30/2020    Subarachnoid hemorrhage (HCC) 2020     Past Surgical History:   Procedure Laterality Date    ORIF WRIST FRACTURE Bilateral 9/1/2020    Procedure:  Open reduction internal fixation right two-part intra-articular distal radius fracture.  Open reduction internal fixation left fracture dislocation distal radius.  Application bilateral short-arm splints.;  Surgeon: Harris Davis MD;  Location: [facility];  Service: Orthopedics     Family History   Problem Relation Age of Onset    Hypertension Mother     Hyperlipidemia Mother     Hypertension Father     Hyperlipidemia Father      Social History     Socioeconomic History    Marital status: /Civil Union     Spouse name: None    Number of children: None    Years of education: None    Highest education level: None   Occupational History    None   Tobacco Use    Smoking status: Never    Smokeless tobacco: Never   Vaping Use    Vaping status: Never Used   Substance and Sexual Activity    Alcohol use: Not Currently     Alcohol/week: 8.0 standard drinks of alcohol     Types: 1 Glasses of wine, 5 Cans of beer, 2 Shots of liquor per week     Comment: socially- but not in the past month.    Drug use: Never    Sexual activity: Yes     Partners: Female     Birth control/protection: None   Other Topics Concern    None   Social History Narrative    ** Merged History Encounter **          Social Determinants of Health     Financial Resource Strain: Not on file   Food Insecurity: Not on file   Transportation Needs: Not on file   Physical Activity: Not on file   Stress: Not on file   Social Connections: Not on file   Intimate Partner Violence: Not on file   Housing Stability: Not on file         Current Outpatient Medications:     amLODIPine (NORVASC) 10 mg tablet, Take 1 tablet (10 mg total) by mouth daily, Disp: 90 tablet, Rfl: 3    atorvastatin (LIPITOR) 20 mg tablet, Take 1 tablet (20 mg total) by mouth daily, Disp: 90 tablet, Rfl: 3    metoprolol succinate (TOPROL-XL) 25 mg 24 hr tablet, Take 1 tablet (25 mg total) by mouth daily, Disp: 90 tablet, Rfl: 3  Allergies   Allergen Reactions    No Known Allergies   "      Objective   Temp 97.6 °F (36.4 °C) (Temporal)   Resp 15   Ht 5' 7\" (1.702 m)   Wt 80.7 kg (178 lb)   BMI 27.88 kg/m²   Physical Exam  Constitutional:       General: He is not in acute distress.     Appearance: He is well-developed and normal weight.   HENT:      Head: Normocephalic and atraumatic.      Right Ear: External ear normal.      Left Ear: External ear normal.      Nose: Nose normal.   Eyes:      General: No scleral icterus.     Conjunctiva/sclera: Conjunctivae normal.   Cardiovascular:      Rate and Rhythm: Normal rate and regular rhythm.   Pulmonary:      Effort: Pulmonary effort is normal. No respiratory distress.      Breath sounds: Normal breath sounds.   Abdominal:      General: Abdomen is flat. There is no distension.      Palpations: Abdomen is soft.      Tenderness: There is no abdominal tenderness.   Skin:     Findings: No rash (on exposed skin.).   Neurological:      Mental Status: He is alert and oriented to person, place, and time.   Psychiatric:         Mood and Affect: Mood normal.         Thought Content: Thought content normal.         Judgment: Judgment normal.     Extremities: No lower extremity edema bilaterally.    Result Review  Labs:   Latest Reference Range & Units 06/18/24 10:15   WBC 4.31 - 10.16 Thousand/uL 3.06 (L)   RBC 3.88 - 5.62 Million/uL 4.99   Hemoglobin 12.0 - 17.0 g/dL 15.7   Hematocrit 36.5 - 49.3 % 43.7   MCV 82 - 98 fL 88   MCH 26.8 - 34.3 pg 31.5   MCHC 31.4 - 37.4 g/dL 35.9   RDW 11.6 - 15.1 % 12.1   Platelet Count 149 - 390 Thousands/uL 263   MPV 8.9 - 12.7 fL 10.2   nRBC /100 WBCs 0   Segmented % 43 - 75 % 33 (L)   Lymphocytes % 14 - 44 % 57 (H)   Monocytes % 4 - 12 % 7   Eosinophils % 0 - 6 % 2   Basophils % 0 - 1 % 1   Immature Grans % 0 - 2 % 0   Absolute Immature Grans 0.00 - 0.20 Thousand/uL 0.01   Absolute Neutrophils 1.85 - 7.62 Thousands/µL 1.02 (L)   Absolute Lymphocytes 0.60 - 4.47 Thousands/µL 1.76   Absolute Monocytes 0.17 - 1.22 Thousand/µL " 0.20   Absolute Eosinophils 0.00 - 0.61 Thousand/µL 0.05   Absolute Basophils 0.00 - 0.10 Thousands/µL 0.02   TSH 3RD GENERATON 0.450 - 4.500 uIU/mL 1.555   (L): Data is abnormally low  (H): Data is abnormally high    Please note:  This report has been generated by a voice recognition software system. Therefore there may be syntax, spelling, and/or grammatical errors. Please call if you have any questions.

## 2024-07-31 ENCOUNTER — APPOINTMENT (OUTPATIENT)
Dept: LAB | Facility: HOSPITAL | Age: 34
End: 2024-07-31
Payer: COMMERCIAL

## 2024-07-31 DIAGNOSIS — D70.8 OTHER NEUTROPENIA (HCC): ICD-10-CM

## 2024-07-31 LAB
CRP SERPL QL: <1 MG/L
FOLATE SERPL-MCNC: 4.8 NG/ML
VIT B12 SERPL-MCNC: 305 PG/ML (ref 180–914)

## 2024-07-31 PROCEDURE — 88185 FLOWCYTOMETRY/TC ADD-ON: CPT

## 2024-07-31 PROCEDURE — 88184 FLOWCYTOMETRY/ TC 1 MARKER: CPT

## 2024-07-31 PROCEDURE — 36415 COLL VENOUS BLD VENIPUNCTURE: CPT

## 2024-07-31 PROCEDURE — 86803 HEPATITIS C AB TEST: CPT

## 2024-07-31 PROCEDURE — 86705 HEP B CORE ANTIBODY IGM: CPT

## 2024-07-31 PROCEDURE — 82746 ASSAY OF FOLIC ACID SERUM: CPT

## 2024-07-31 PROCEDURE — 87389 HIV-1 AG W/HIV-1&-2 AB AG IA: CPT

## 2024-07-31 PROCEDURE — 86140 C-REACTIVE PROTEIN: CPT

## 2024-07-31 PROCEDURE — 87340 HEPATITIS B SURFACE AG IA: CPT

## 2024-07-31 PROCEDURE — 82607 VITAMIN B-12: CPT

## 2024-07-31 PROCEDURE — 86704 HEP B CORE ANTIBODY TOTAL: CPT

## 2024-08-01 LAB
HBV CORE AB SER QL: NORMAL
HBV CORE IGM SER QL: NORMAL
HBV SURFACE AG SER QL: NORMAL
HCV AB SER QL: NORMAL
HIV 1+2 AB+HIV1 P24 AG SERPL QL IA: NORMAL
HIV 2 AB SERPL QL IA: NORMAL
HIV1 AB SERPL QL IA: NORMAL
HIV1 P24 AG SERPL QL IA: NORMAL

## 2024-08-07 DIAGNOSIS — D70.9 NEUTROPENIA, UNSPECIFIED TYPE (HCC): Primary | ICD-10-CM

## 2024-08-07 DIAGNOSIS — E53.8 LOW FOLATE: Primary | ICD-10-CM

## 2024-08-07 LAB — SCAN RESULT: NORMAL

## 2024-08-07 RX ORDER — FOLIC ACID 1 MG/1
1 TABLET ORAL DAILY
Qty: 30 TABLET | Refills: 2 | Status: SHIPPED | OUTPATIENT
Start: 2024-08-07

## 2024-08-07 NOTE — TELEPHONE ENCOUNTER
----- Message from Zhanna Guadarrama PA-C sent at 8/7/2024  7:59 AM EDT -----  Justine can you ask him to take folic acid 1mg daily and Vitamin B12 1000mcg daily gummy and repeat both prior to his next OV. Ty    Spoke to patient  Patient verbalizes understanding of plan

## 2024-08-20 ENCOUNTER — HOSPITAL ENCOUNTER (OUTPATIENT)
Dept: NON INVASIVE DIAGNOSTICS | Facility: CLINIC | Age: 34
Discharge: HOME | End: 2024-08-20
Payer: COMMERCIAL

## 2024-08-20 VITALS
HEART RATE: 70 BPM | WEIGHT: 178 LBS | DIASTOLIC BLOOD PRESSURE: 68 MMHG | SYSTOLIC BLOOD PRESSURE: 104 MMHG | HEIGHT: 67 IN | BODY MASS INDEX: 27.94 KG/M2

## 2024-08-20 DIAGNOSIS — I10 PRIMARY HYPERTENSION: ICD-10-CM

## 2024-08-20 DIAGNOSIS — I11.9 HYPERTENSIVE HEART DISEASE WITHOUT HEART FAILURE: ICD-10-CM

## 2024-08-20 LAB
AORTIC ROOT: 2.7 CM
APICAL FOUR CHAMBER EJECTION FRACTION: 56 %
BSA FOR ECHO PROCEDURE: 1.92 M2
E WAVE DECELERATION TIME: 191 MS
E/A RATIO: 1.43
FRACTIONAL SHORTENING: 32 (ref 28–44)
INTERVENTRICULAR SEPTUM IN DIASTOLE (PARASTERNAL SHORT AXIS VIEW): 0.9 CM
INTERVENTRICULAR SEPTUM: 0.9 CM (ref 0.6–1.1)
LAAS-AP2: 16.1 CM2
LAAS-AP4: 10.8 CM2
LEFT ATRIUM SIZE: 3.7 CM
LEFT ATRIUM VOLUME (MOD BIPLANE): 35 ML
LEFT ATRIUM VOLUME INDEX (MOD BIPLANE): 18.2 ML/M2
LEFT INTERNAL DIMENSION IN SYSTOLE: 3.2 CM (ref 2.1–4)
LEFT VENTRICULAR INTERNAL DIMENSION IN DIASTOLE: 4.7 CM (ref 3.5–6)
LEFT VENTRICULAR POSTERIOR WALL IN END DIASTOLE: 1 CM
LEFT VENTRICULAR STROKE VOLUME: 61 ML
LVSV (TEICH): 61 ML
MV E'TISSUE VEL-SEP: 10 CM/S
MV PEAK A VEL: 0.53 M/S
MV PEAK E VEL: 76 CM/S
MV STENOSIS PRESSURE HALF TIME: 55 MS
MV VALVE AREA P 1/2 METHOD: 4
RA PRESSURE ESTIMATED: 3 MMHG
RIGHT ATRIUM AREA SYSTOLE A4C: 16 CM2
RIGHT VENTRICLE ID DIMENSION: 4.1 CM
RV PSP: 25 MMHG
SL CV LEFT ATRIUM LENGTH A2C: 4.5 CM
SL CV LV EF: 55
SL CV PED ECHO LEFT VENTRICLE DIASTOLIC VOLUME (MOD BIPLANE) 2D: 102 ML
SL CV PED ECHO LEFT VENTRICLE SYSTOLIC VOLUME (MOD BIPLANE) 2D: 41 ML
TR MAX PG: 22 MMHG
TR PEAK VELOCITY: 2.3 M/S
TRICUSPID ANNULAR PLANE SYSTOLIC EXCURSION: 2.3 CM
TRICUSPID VALVE PEAK REGURGITATION VELOCITY: 2.33 M/S

## 2024-08-20 PROCEDURE — 93306 TTE W/DOPPLER COMPLETE: CPT

## 2024-09-08 ENCOUNTER — APPOINTMENT (OUTPATIENT)
Dept: LAB | Facility: HOSPITAL | Age: 34
End: 2024-09-08

## 2024-09-08 DIAGNOSIS — D70.9 NEUTROPENIA, UNSPECIFIED TYPE (HCC): ICD-10-CM

## 2024-09-08 LAB
FOLATE SERPL-MCNC: 18.8 NG/ML
VIT B12 SERPL-MCNC: 325 PG/ML (ref 180–914)

## 2024-09-08 PROCEDURE — 82607 VITAMIN B-12: CPT

## 2024-09-08 PROCEDURE — 82746 ASSAY OF FOLIC ACID SERUM: CPT

## 2024-09-08 PROCEDURE — 36415 COLL VENOUS BLD VENIPUNCTURE: CPT

## 2024-09-10 ENCOUNTER — OFFICE VISIT (OUTPATIENT)
Dept: HEMATOLOGY ONCOLOGY | Facility: MEDICAL CENTER | Age: 34
End: 2024-09-10
Payer: COMMERCIAL

## 2024-09-10 VITALS
BODY MASS INDEX: 28.66 KG/M2 | TEMPERATURE: 98 F | SYSTOLIC BLOOD PRESSURE: 128 MMHG | RESPIRATION RATE: 16 BRPM | WEIGHT: 182.6 LBS | HEART RATE: 66 BPM | HEIGHT: 67 IN | OXYGEN SATURATION: 98 % | DIASTOLIC BLOOD PRESSURE: 80 MMHG

## 2024-09-10 DIAGNOSIS — E53.8 VITAMIN B12 DEFICIENCY: ICD-10-CM

## 2024-09-10 DIAGNOSIS — D70.9 NEUTROPENIA, UNSPECIFIED TYPE (HCC): Primary | ICD-10-CM

## 2024-09-10 DIAGNOSIS — E53.8 LOW FOLATE: ICD-10-CM

## 2024-09-10 PROCEDURE — 99213 OFFICE O/P EST LOW 20 MIN: CPT | Performed by: PHYSICIAN ASSISTANT

## 2024-09-10 NOTE — PROGRESS NOTES
[facility]  [address] [place] [id number]  Hematology Ambulatory Follow-up  Vinod Haddad, 1990, [id number]  9/10/2024      Assessment and Plan   1. Other neutropenia (HCC)    Patient with persistent leukopenia dating back to 4/2023.      Etiology is not completely clear.  We discussed that leukopenia can be secondary to medications, nutritional deficiency, chronic infections, autoimmune issues, rarely blood or bone marrow disorders.    Currently the patient feels well and has no evidence of B symptoms (night sweats, weight loss, fevers).  He has no frequent infections.      He denies history of IV drug abuse.    We crystal additional lab work following his last visit.  Folate level was 4.8.  He was started on folic acid 1 mg daily.  Folate level has improved to 18.8.  Vitamin B12 level is 325. I have asked to begin taking Vitamin B12 1000mcg gummy daily Monday, Wednesday, Friday.     Hepatitis B and C testing unremarkable.  HIV testing unremarkable.  C-reactive protein unremarkable.  Flow cytometry showed relative neutropenia with otherwise no diagnostic immunophenotypic abnormalities.    I would like for him to repeat a CBC in the next few days.    The patient is scheduled for follow-up in approximately 6 months time, or sooner if necessary.  Patient voiced agreement and understanding to the above.   Patient knows to call the Hematology/Oncology office with any questions and concerns regarding the above.    Barrier(s) to care: None.   The patient is able to self care.    Subjective     Chief Complaint   Patient presents with    Follow-up     History of present illness:   Patient presents in follow-up for leukopenia.    His past medical history is significant only for hypertension and hyperlipidemia.    He takes amlodipine, metoprolol, and a statin.  He just darted these medications a few weeks ago.    He has finding of persistent  leukopenia dating back to April 2023.      He had lab work drawn on 6/18/2024.  At that time WBC count was 3.06, ANC 1.02, hemoglobin 15.7, hematocrit 43.7, MCV 88, platelets 263.    Patient denies frequent infections.  No frequent hospitalizations.    HIV testing in 2020 was negative.  Hepatitis C testing in 2021 was negative.    He denies history of IV drug abuse.    He denies any pertinent family history.  No family cancer history.    He says he had a bone marrow biopsy in 2012. He is unsure why.    Patient presents today in follow-up.    He denies nausea, vomiting, bowel changes, chest pain, shortness of breath.  He denies B symptoms.  No unexplained weight loss, drenching night sweats, unexplained fevers.  No palpable adenopathy.    Review of Systems   Constitutional:  Negative for activity change, appetite change, fatigue and fever.   HENT:  Negative for nosebleeds.    Respiratory:  Negative for cough, choking and shortness of breath.    Cardiovascular:  Negative for chest pain, palpitations and leg swelling.   Gastrointestinal:  Negative for abdominal distention, abdominal pain, anal bleeding, blood in stool, constipation, diarrhea, nausea and vomiting.   Endocrine: Negative for cold intolerance.   Genitourinary:  Negative for hematuria.   Musculoskeletal:  Negative for myalgias.   Skin:  Negative for color change, pallor and rash.   Allergic/Immunologic: Negative for immunocompromised state.   Neurological:  Negative for headaches.   Hematological:  Negative for adenopathy. Does not bruise/bleed easily.   All other systems reviewed and are negative.      Past Medical History:   Diagnosis Date    CKD (chronic kidney disease) stage 2, GFR 60-89 ml/min     History of coronary vasospasm     History of subarachnoid hemorrhage 09/06/2020    Hyperlipidemia     Hypertension     Peritonsillar abscess 01/23/2024    Post concussion syndrome 09/15/2020    SAH (subarachnoid hemorrhage) (HCC) 08/30/2020    Subarachnoid  hemorrhage (HCC) 2020     Past Surgical History:   Procedure Laterality Date    ORIF WRIST FRACTURE Bilateral 9/1/2020    Procedure: Open reduction internal fixation right two-part intra-articular distal radius fracture.  Open reduction internal fixation left fracture dislocation distal radius.  Application bilateral short-arm splints.;  Surgeon: Harris Davis MD;  Location: [facility];  Service: Orthopedics     Family History   Problem Relation Age of Onset    Hypertension Mother     Hyperlipidemia Mother     Hypertension Father     Hyperlipidemia Father      Social History     Socioeconomic History    Marital status: /Civil Union     Spouse name: Not on file    Number of children: Not on file    Years of education: Not on file    Highest education level: Not on file   Occupational History    Not on file   Tobacco Use    Smoking status: Never    Smokeless tobacco: Never   Vaping Use    Vaping status: Never Used   Substance and Sexual Activity    Alcohol use: Not Currently     Alcohol/week: 8.0 standard drinks of alcohol     Types: 1 Glasses of wine, 5 Cans of beer, 2 Shots of liquor per week     Comment: socially- but not in the past month.    Drug use: Never    Sexual activity: Yes     Partners: Female     Birth control/protection: None   Other Topics Concern    Not on file   Social History Narrative    ** Merged History Encounter **          Social Determinants of Health     Financial Resource Strain: Not on file   Food Insecurity: Not on file   Transportation Needs: Not on file   Physical Activity: Not on file   Stress: Not on file   Social Connections: Not on file   Intimate Partner Violence: Not on file   Housing Stability: Not on file         Current Outpatient Medications:     amLODIPine (NORVASC) 10 mg tablet, Take 1 tablet (10 mg total) by mouth daily, Disp: 90 tablet, Rfl: 3    atorvastatin (LIPITOR) 20 mg tablet, Take 1 tablet (20 mg total) by mouth daily, Disp: 90 tablet, Rfl: 3    folic  "acid (KP Folic Acid) 1 mg tablet, Take 1 tablet (1 mg total) by mouth daily, Disp: 30 tablet, Rfl: 2    metoprolol succinate (TOPROL-XL) 25 mg 24 hr tablet, Take 1 tablet (25 mg total) by mouth daily, Disp: 90 tablet, Rfl: 3  Allergies   Allergen Reactions    No Known Allergies        Objective   /80 (BP Location: Left arm, Patient Position: Sitting, Cuff Size: Standard)   Pulse 66   Temp 98 °F (36.7 °C) (Tympanic)   Resp 16   Ht 5' 7\" (1.702 m)   Wt 82.8 kg (182 lb 9.6 oz)   SpO2 98%   BMI 28.60 kg/m²   Physical Exam  Constitutional:       General: He is not in acute distress.     Appearance: He is well-developed and normal weight.   HENT:      Head: Normocephalic and atraumatic.      Right Ear: External ear normal.      Left Ear: External ear normal.      Nose: Nose normal.   Eyes:      General: No scleral icterus.     Conjunctiva/sclera: Conjunctivae normal.   Cardiovascular:      Rate and Rhythm: Normal rate and regular rhythm.   Pulmonary:      Effort: Pulmonary effort is normal. No respiratory distress.      Breath sounds: Normal breath sounds.   Abdominal:      General: Abdomen is flat. There is no distension.      Palpations: Abdomen is soft.      Tenderness: There is no abdominal tenderness.   Skin:     Findings: No rash (on exposed skin.).   Neurological:      Mental Status: He is alert and oriented to person, place, and time.   Psychiatric:         Mood and Affect: Mood normal.         Thought Content: Thought content normal.         Judgment: Judgment normal.     Extremities: No lower extremity edema bilaterally.  Lymph: No cervical, supraclavicular, or occipital adenopathy.    Result Review  Labs:  Folate level was 4.8.  He was started on folic acid 1 mg daily.  Folate level has improved to 18.8.  Vitamin B12 level is 325.  Hepatitis B and C testing unremarkable.  HIV testing unremarkable.  C-reactive protein unremarkable.  Flow cytometry showed relative neutropenia with otherwise no " diagnostic immunophenotypic abnormalities.     Latest Reference Range & Units 06/18/24 10:15   WBC 4.31 - 10.16 Thousand/uL 3.06 (L)   RBC 3.88 - 5.62 Million/uL 4.99   Hemoglobin 12.0 - 17.0 g/dL 15.7   Hematocrit 36.5 - 49.3 % 43.7   MCV 82 - 98 fL 88   MCH 26.8 - 34.3 pg 31.5   MCHC 31.4 - 37.4 g/dL 35.9   RDW 11.6 - 15.1 % 12.1   Platelet Count 149 - 390 Thousands/uL 263   MPV 8.9 - 12.7 fL 10.2   nRBC /100 WBCs 0   Segmented % 43 - 75 % 33 (L)   Lymphocytes % 14 - 44 % 57 (H)   Monocytes % 4 - 12 % 7   Eosinophils % 0 - 6 % 2   Basophils % 0 - 1 % 1   Immature Grans % 0 - 2 % 0   Absolute Immature Grans 0.00 - 0.20 Thousand/uL 0.01   Absolute Neutrophils 1.85 - 7.62 Thousands/µL 1.02 (L)   Absolute Lymphocytes 0.60 - 4.47 Thousands/µL 1.76   Absolute Monocytes 0.17 - 1.22 Thousand/µL 0.20   Absolute Eosinophils 0.00 - 0.61 Thousand/µL 0.05   Absolute Basophils 0.00 - 0.10 Thousands/µL 0.02   TSH 3RD GENERATON 0.450 - 4.500 uIU/mL 1.555   (L): Data is abnormally low  (H): Data is abnormally high    Please note:  This report has been generated by a voice recognition software system. Therefore there may be syntax, spelling, and/or grammatical errors. Please call if you have any questions.

## 2024-10-08 ENCOUNTER — OFFICE VISIT (OUTPATIENT)
Dept: CARDIOLOGY CLINIC | Facility: CLINIC | Age: 34
End: 2024-10-08

## 2024-10-08 VITALS
WEIGHT: 181 LBS | DIASTOLIC BLOOD PRESSURE: 72 MMHG | SYSTOLIC BLOOD PRESSURE: 120 MMHG | HEART RATE: 77 BPM | BODY MASS INDEX: 28.41 KG/M2 | HEIGHT: 67 IN

## 2024-10-08 DIAGNOSIS — E78.2 MIXED HYPERLIPIDEMIA: ICD-10-CM

## 2024-10-08 DIAGNOSIS — I20.1 CORONARY VASOSPASM (HCC): ICD-10-CM

## 2024-10-08 DIAGNOSIS — I10 PRIMARY HYPERTENSION: Primary | ICD-10-CM

## 2024-10-08 PROBLEM — R03.0 ELEVATED BP WITHOUT DIAGNOSIS OF HYPERTENSION: Status: RESOLVED | Noted: 2024-01-23 | Resolved: 2024-10-08

## 2024-10-08 PROBLEM — R07.9 CHEST PAIN: Status: RESOLVED | Noted: 2019-08-07 | Resolved: 2024-10-08

## 2024-10-08 PROCEDURE — 99214 OFFICE O/P EST MOD 30 MIN: CPT | Performed by: INTERNAL MEDICINE

## 2024-10-08 NOTE — PATIENT INSTRUCTIONS
CARDIOLOGY ASSESSMENT & PLAN      Diagnosis ICD-10-CM Associated Orders   1. Primary hypertension  I10       2. Mixed hyperlipidemia  E78.2       3. Coronary vasospasm (HCC)  I20.1          1. Primary hypertension  Assessment & Plan:  Blood pressure is to be very controlled now.  Recent echocardiogram demonstrated preserved left ventricular systolic and diastolic function.  There was no significant left ventricular hypertrophy.  -- Advising him to continue current medications.  2. Mixed hyperlipidemia  Assessment & Plan:  Has found history of stroke.  His grandfather.  Lipids were borderline abnormal and has been on low intensity statin therapy.  -- Advising to continue statin therapy with goal to achieve LDL level less than 90 mg/dL.  -- Advising to continue normal pharmacologic measures as advised to him during previous visit.  -- We should have follow-up visit in 1 year time.  -- Dietary and medical compliance are reinforced.  -- He is advised  to report any concerning symptoms such as chest pain, shortness of breath, decline in exercise tolerance or presyncope/syncope.  3. Coronary vasospasm (HCC)  As
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
## 2024-10-08 NOTE — ASSESSMENT & PLAN NOTE
Has found history of stroke.  His grandfather.  Lipids were borderline abnormal and has been on low intensity statin therapy.  -- Advising to continue statin therapy with goal to achieve LDL level less than 90 mg/dL.  -- Advising to continue normal pharmacologic measures as advised to him during previous visit.  -- We should have follow-up visit in 1 year time.  -- Dietary and medical compliance are reinforced.  -- He is advised  to report any concerning symptoms such as chest pain, shortness of breath, decline in exercise tolerance or presyncope/syncope.

## 2024-10-08 NOTE — ASSESSMENT & PLAN NOTE
Has had no recurrence of chest pain.  Had catheter induced significant vasospasm during the cardiac catheterization procedure and hence has been placed on amlodipine therapy.  -- He is advised to continue current medications.

## 2024-11-15 DIAGNOSIS — E53.8 LOW FOLATE: ICD-10-CM

## 2024-11-15 RX ORDER — FOLIC ACID 1 MG/1
1000 TABLET ORAL DAILY
Qty: 30 TABLET | Refills: 2 | Status: SHIPPED | OUTPATIENT
Start: 2024-11-15

## 2025-01-07 ENCOUNTER — OFFICE VISIT (OUTPATIENT)
Dept: URGENT CARE | Facility: MEDICAL CENTER | Age: 35
End: 2025-01-07
Payer: COMMERCIAL

## 2025-01-07 VITALS
DIASTOLIC BLOOD PRESSURE: 86 MMHG | TEMPERATURE: 97.6 F | HEART RATE: 64 BPM | RESPIRATION RATE: 16 BRPM | OXYGEN SATURATION: 99 % | SYSTOLIC BLOOD PRESSURE: 132 MMHG

## 2025-01-07 DIAGNOSIS — H00.012 HORDEOLUM EXTERNUM OF RIGHT LOWER EYELID: Primary | ICD-10-CM

## 2025-01-07 PROCEDURE — S9083 URGENT CARE CENTER GLOBAL: HCPCS

## 2025-01-07 PROCEDURE — G0382 LEV 3 HOSP TYPE B ED VISIT: HCPCS

## 2025-01-07 RX ORDER — ERYTHROMYCIN 5 MG/G
0.5 OINTMENT OPHTHALMIC
Qty: 3.5 G | Refills: 0 | Status: SHIPPED | OUTPATIENT
Start: 2025-01-07

## 2025-01-07 NOTE — PATIENT INSTRUCTIONS
"Your symptoms are likely due to a stye.   Prescribed a course of erythromycin eye ointment, use as directed.   Warm compresses to your eye four times daily for 15 minute periods.   Over the counter pain medication as directed on packaging as needed for pain (ex: Tylenol, Motrin).    Follow up with PCP in 3-5 days.  Proceed to  ER if symptoms worsen.    If tests are performed, our office will contact you with results only if changes need to made to the care plan discussed with you at the visit. You can review your full results on [facility].    Patient Education     Stye   The Basics   Written by the doctors and editors at [facility]   What is a stye? -- A stye is a red and painful lump on the eyelid. It happens when a small gland on the edge of the eyelid gets infected or inflamed. Styes can form on the upper or lower eyelids. Most styes get better on their own after a few days to a week. The medical term for stye is \"hordeolum.\"  People sometimes get a stye confused with a different eye problem called a \"chalazion.\" A chalazion also causes a lump on the eyelid. But a stye is caused by an infection and is painful. A chalazion is not tender or painful, but it often lasts longer than a stye does.  What are the symptoms of a stye? -- People who have a stye have a painful lump on the edge of their eyelid (picture 1). The lump might look red, swollen, or similar to a pimple.  A stye usually develops over a few days. Styes can cause other symptoms, too, such as tearing and eyelid pain and swelling.  Is there a test for a stye? -- No. But your doctor or nurse should be able to tell if you have a stye by talking with you and doing an exam.  Is there anything I can do on my own? -- Yes:   Put a warm, wet compress on the stye. Wet a clean washcloth with warm water, and put it over your stye. When the washcloth cools, reheat it with warm water and put it back over the stye. Repeat these steps for about 15 minutes, and " try to do this 4 times a day.   It might help to gently massage your eyelid.   Do not squeeze or try to pop your stye. This can make it worse.   Do not wear eye makeup or contact lenses until your stye is gone.  What treatments might my doctor use? -- If your stye doesn't get better or if it leads to other problems, your doctor might:   Prescribe a cream or ointment that goes in the eye and on the eyelid   Prescribe antibiotic medicines   Do a procedure to drain the stye  Can styes be prevented? -- Yes. To lower your chances of getting a stye, you can:   Wash your hands often - It's especially important to wash your hands before you touch your eyes. Also, if you wear contact lenses, keep them clean and wash your hands before you put them in.   Be careful with your eye makeup - Wearing eye makeup can sometimes cause a stye. Remove your eye makeup each night, and throw away old makeup. Do not share eye makeup with other people.  When should I call the doctor? -- Call your doctor or nurse for advice if:   Your stye doesn't go away after using warm compresses for 1 to 2 weeks.   Your stye gets very big, bleeds, or affects your vision.   Your whole eye is red, or your whole eyelid is red or swollen.   The redness or swelling spreads to your cheek or other parts of your face.  All topics are updated as new evidence becomes available and our peer review process is complete.  This topic retrieved from [employer] on: Feb 26, 2024.  Topic 40194 Version 17.0  Release: 32.2.4 - C32.56  © 2024 UpToDate, Inc. and/or its affiliates. All rights reserved.  picture 1: Stye     This person has a stye on the lower eyelid.  Graphic 09998 Version 2.0  Consumer Information Use and Disclaimer   Disclaimer: This generalized information is a limited summary of diagnosis, treatment, and/or medication information. It is not meant to be comprehensive and should be used as a tool to help the user understand and/or assess potential diagnostic and  treatment options. It does NOT include all information about conditions, treatments, medications, side effects, or risks that may apply to a specific patient. It is not intended to be medical advice or a substitute for the medical advice, diagnosis, or treatment of a health care provider based on the health care provider's examination and assessment of a patient's specific and unique circumstances. Patients must speak with a health care provider for complete information about their health, medical questions, and treatment options, including any risks or benefits regarding use of medications. This information does not endorse any treatments or medications as safe, effective, or approved for treating a specific patient. UpToDate, Inc. and its affiliates disclaim any warranty or liability relating to this information or the use thereof.The use of this information is governed by the Terms of Use, available at https://www.[employer] Datauwer.com/en/know/clinical-effectiveness-terms. 2024© UpToDate, Inc. and its affiliates and/or licensors. All rights reserved.  Copyright   © 2024 UpToDate, Inc. and/or its affiliates. All rights reserved.

## 2025-01-07 NOTE — PROGRESS NOTES
"  [facility]        NAME: Vinod Haddad is a 34 y.o. male  : 1990    MRN: [id number]  DATE: 2025  TIME: 12:39 PM    Assessment and Plan   Hordeolum externum of right lower eyelid [H00.012]  1. Hordeolum externum of right lower eyelid  erythromycin (ILOTYCIN) ophthalmic ointment        Presentation consistent with stye of right lower eyelid.  Prescribed erythromycin ointment.  Advised symptomatic treatment.  Discussed PCP follow-up if no improvement in 2 to 3 days.    Patient Instructions     Your symptoms are likely due to a stye.   Prescribed a course of erythromycin eye ointment, use as directed.   Warm compresses to your eye four times daily for 15 minute periods.   Over the counter pain medication as directed on packaging as needed for pain (ex: Tylenol, Motrin).    Follow up with PCP in 3-5 days.  Proceed to  ER if symptoms worsen.    If tests are performed, our office will contact you with results only if changes need to made to the care plan discussed with you at the visit. You [facility]    Chief Complaint     Chief Complaint   Patient presents with    Eye Pain     Pt has right eye pain starting 2 days ago with a small \"pimple\", yesterday his under-eye became inflamed          History of Present Illness       Patient presents for evaluation of right eye issue.  Notes symptoms started 2 days ago.  It started as a pimple to his lower eyelash line, and has become more red and swollen since.  Area is painful, tender to touch.  Pain can go up to a 10/10 at worst, currently is a 6/10 without touching the area.    Eye Pain   The right eye is affected. This is a new problem. The current episode started in the past 7 days. The problem has been gradually worsening. There was no injury mechanism. The pain is at a severity of 6/10. There is No known exposure to pink eye. He Does not wear contacts. Associated symptoms include blurred vision and eye " redness. Pertinent negatives include no eye discharge, double vision, itching or photophobia. Treatments tried: warm compress. The treatment provided no relief.       Review of Systems   Review of Systems   Eyes:  Positive for blurred vision, pain, redness and visual disturbance (blurry). Negative for double vision, photophobia, discharge and itching.        + watery         Current Medications       Current Outpatient Medications:     erythromycin (ILOTYCIN) ophthalmic ointment, Administer 0.5 inches to the right eye daily at bedtime, Disp: 3.5 g, Rfl: 0    amLODIPine (NORVASC) 10 mg tablet, Take 1 tablet (10 mg total) by mouth daily, Disp: 90 tablet, Rfl: 3    atorvastatin (LIPITOR) 20 mg tablet, Take 1 tablet (20 mg total) by mouth daily, Disp: 90 tablet, Rfl: 3    folic acid (FOLVITE) 1 mg tablet, take 1 tablet by mouth once daily, Disp: 30 tablet, Rfl: 2    metoprolol succinate (TOPROL-XL) 25 mg 24 hr tablet, Take 1 tablet (25 mg total) by mouth daily, Disp: 90 tablet, Rfl: 3    Current Allergies     Allergies as of 01/07/2025 - Reviewed 01/07/2025   Allergen Reaction Noted    No known allergies  07/03/2019            The following portions of the patient's history were reviewed and updated as appropriate: allergies, current medications, past family history, past medical history, past social history, past surgical history and problem list.     Past Medical History:   Diagnosis Date    CKD (chronic kidney disease) stage 2, GFR 60-89 ml/min     History of coronary vasospasm     History of subarachnoid hemorrhage 09/06/2020    Hyperlipidemia     Hypertension     Peritonsillar abscess 01/23/2024    Post concussion syndrome 09/15/2020    SAH (subarachnoid hemorrhage) (HCC) 08/30/2020    Subarachnoid hemorrhage (HCC) 2020       Past Surgical History:   Procedure Laterality Date    ORIF WRIST FRACTURE Bilateral 9/1/2020    Procedure: Open reduction internal fixation right two-part intra-articular distal radius  fracture.  Open reduction internal fixation left fracture dislocation distal radius.  Application bilateral short-arm splints.;  Surgeon: Harris Davis MD;  Location: [facility];  Service: Orthopedics       Family History   Problem Relation Age of Onset    Hypertension Mother     Hyperlipidemia Mother     Hypertension Father     Hyperlipidemia Father          Medications have been verified.        Objective   /86   Pulse 64   Temp 97.6 °F (36.4 °C)   Resp 16   SpO2 99%        Physical Exam     Physical Exam  Vitals and nursing note reviewed.   Constitutional:       General: He is not in acute distress.     Appearance: Normal appearance. He is not ill-appearing.   Eyes:      General: Lids are everted, no foreign bodies appreciated. Vision grossly intact. Gaze aligned appropriately.         Right eye: Hordeolum (R lower eyelid) present. No foreign body or discharge.      Extraocular Movements: Extraocular movements intact.      Conjunctiva/sclera:      Right eye: Right conjunctiva is not injected.      Pupils: Pupils are equal, round, and reactive to light.      Comments: Mild swelling to right lower periorbital area.   Cardiovascular:      Rate and Rhythm: Normal rate and regular rhythm.      Pulses: Normal pulses.      Heart sounds: Normal heart sounds.   Pulmonary:      Effort: Pulmonary effort is normal.      Breath sounds: Normal breath sounds.   Neurological:      Mental Status: He is alert.

## 2025-01-14 NOTE — ASSESSMENT & PLAN NOTE
Case Management Progress Note    Patient name Bart Jasso  Location [address] [address]/2 E [id number] MRN [id number]  : 1941 Date 2025       LOS (days): 6  Geometric Mean LOS (GMLOS) (days): 3.4  Days to GMLOS:-2.7        OBJECTIVE:        Current admission status: Inpatient  Preferred Pharmacy:   [facility]/pharmacy #[id number] - [facility] [place]AN O[place]EDA - [address] [place] [address] [place] [address]  Phone: [phone] Fax: [phone]    Primary Care Provider: MAKAYLA Chapman    Primary Insurance: NOL[employer]SON PENDING  Secondary Insurance:     PROGRESS NOTE:    CM reviewed chart and discussed case in rounds.  Patient spike fever.     Per ID continued close observation off antibiotics.   Check CBC/ANC in am    Plan - Patient plans to return home with local supportive family upon d/c.  She was visiting them from [facility].         Blood pressure is to be very controlled now.  Recent echocardiogram demonstrated preserved left ventricular systolic and diastolic function.  There was no significant left ventricular hypertrophy.  -- Advising him to continue current medications.

## 2025-02-11 ENCOUNTER — OFFICE VISIT (OUTPATIENT)
Dept: FAMILY MEDICINE CLINIC | Facility: CLINIC | Age: 35
End: 2025-02-11
Payer: COMMERCIAL

## 2025-02-11 VITALS
HEART RATE: 72 BPM | DIASTOLIC BLOOD PRESSURE: 80 MMHG | HEIGHT: 67 IN | SYSTOLIC BLOOD PRESSURE: 120 MMHG | WEIGHT: 182 LBS | OXYGEN SATURATION: 98 % | TEMPERATURE: 97.8 F | RESPIRATION RATE: 16 BRPM | BODY MASS INDEX: 28.56 KG/M2

## 2025-02-11 DIAGNOSIS — E53.8 LOW FOLATE: ICD-10-CM

## 2025-02-11 DIAGNOSIS — I10 PRIMARY HYPERTENSION: ICD-10-CM

## 2025-02-11 DIAGNOSIS — E78.2 MIXED HYPERLIPIDEMIA: ICD-10-CM

## 2025-02-11 PROCEDURE — 99214 OFFICE O/P EST MOD 30 MIN: CPT | Performed by: FAMILY MEDICINE

## 2025-02-11 RX ORDER — ATORVASTATIN CALCIUM 20 MG/1
20 TABLET, FILM COATED ORAL DAILY
Qty: 90 TABLET | Refills: 3 | Status: SHIPPED | OUTPATIENT
Start: 2025-02-11

## 2025-02-11 RX ORDER — FOLIC ACID 1 MG/1
1000 TABLET ORAL DAILY
Qty: 30 TABLET | Refills: 2 | Status: SHIPPED | OUTPATIENT
Start: 2025-02-11

## 2025-02-11 RX ORDER — METOPROLOL SUCCINATE 25 MG/1
25 TABLET, EXTENDED RELEASE ORAL DAILY
Qty: 90 TABLET | Refills: 3 | Status: SHIPPED | OUTPATIENT
Start: 2025-02-11

## 2025-02-11 RX ORDER — AMLODIPINE BESYLATE 10 MG/1
10 TABLET ORAL DAILY
Qty: 90 TABLET | Refills: 3 | Status: SHIPPED | OUTPATIENT
Start: 2025-02-11

## 2025-02-11 NOTE — ASSESSMENT & PLAN NOTE
Follow-up Visit: Hypertension, Hyperlipidemia, and Benign Ethnic Neutropenia    Assessment and Plan:  1. Hypertension:  - Well-controlled on current regimen with /80  - Continue Metoprolol 25mg daily  - Continue current medication schedule (evening administration)  Orders:    amLODIPine (NORVASC) 10 mg tablet; Take 1 tablet (10 mg total) by mouth daily    metoprolol succinate (TOPROL-XL) 25 mg 24 hr tablet; Take 1 tablet (25 mg total) by mouth daily

## 2025-02-11 NOTE — ASSESSMENT & PLAN NOTE
Orders:    folic acid (FOLVITE) 1 mg tablet; Take 1 tablet (1,000 mcg total) by mouth daily  3. Benign Ethnic Neutropenia (LAVELLE):  - Stable, being followed by Hematology  - Lab work due before next Hematology appointment on March 10  - Continue Vitamin B12 supplementation as prescribed

## 2025-02-11 NOTE — ASSESSMENT & PLAN NOTE
2. Hyperlipidemia:  - On Atorvastatin 10mg daily  - Labs pending - Will check lipid panel  - Continue current medication schedule (morning administration)  Orders:    atorvastatin (LIPITOR) 20 mg tablet; Take 1 tablet (20 mg total) by mouth daily    Lipid panel; Future

## 2025-02-11 NOTE — PROGRESS NOTES
Name: Vinod Haddad      : 1990      MRN: [id number]  Encounter Provider: Petr De La Garza MD  Encounter Date: 2025   Encounter department: [facility]    Assessment & Plan  Primary hypertension  Follow-up Visit: Hypertension, Hyperlipidemia, and Benign Ethnic Neutropenia    Assessment and Plan:  1. Hypertension:  - Well-controlled on current regimen with /80  - Continue Metoprolol 25mg daily  - Continue current medication schedule (evening administration)  Orders:    amLODIPine (NORVASC) 10 mg tablet; Take 1 tablet (10 mg total) by mouth daily    metoprolol succinate (TOPROL-XL) 25 mg 24 hr tablet; Take 1 tablet (25 mg total) by mouth daily    Mixed hyperlipidemia  2. Hyperlipidemia:  - On Atorvastatin 10mg daily  - Labs pending - Will check lipid panel  - Continue current medication schedule (morning administration)  Orders:    atorvastatin (LIPITOR) 20 mg tablet; Take 1 tablet (20 mg total) by mouth daily    Lipid panel; Future    Low folate    Orders:    folic acid (FOLVITE) 1 mg tablet; Take 1 tablet (1,000 mcg total) by mouth daily  3. Benign Ethnic Neutropenia (LAVELLE):  - Stable, being followed by Hematology  - Lab work due before next Hematology appointment on March 10  - Continue Vitamin B12 supplementation as prescribed     3. Benign Ethnic Neutropenia (LAVELLE):  - Stable, being followed by Hematology  - Lab work due before next Hematology appointment on March 10  - Continue Vitamin B12 supplementation as prescribed                Subjective       34-year-old [place] male presents for follow-up of multiple chronic conditions. Patient reports occasional mild chest discomfort. Current medication regimen includes Metoprolol 25mg daily, Atorvastatin 10mg daily, Folic acid 1mg daily, and Omega-3 supplements (4 pills daily). Patient reports taking Metoprolol and Folic acid in the evening, with other medications in the morning. Patient denies peripheral  "edema. Social history reveals he has been  for 12 years with two children. No other acute complaints reported.      Allergies   Allergen Reactions    No Known Allergies      Patient Active Problem List   Diagnosis    Mixed hyperlipidemia    Coronary vasospasm (HCC)    Elevated serum creatinine    Primary hypertension    Elevated bilirubin    Neutropenia (HCC)    Leucopenia    Low folate    Vitamin B12 deficiency       Current Outpatient Medications:     amLODIPine (NORVASC) 10 mg tablet, Take 1 tablet (10 mg total) by mouth daily, Disp: 90 tablet, Rfl: 3    atorvastatin (LIPITOR) 20 mg tablet, Take 1 tablet (20 mg total) by mouth daily, Disp: 90 tablet, Rfl: 3    folic acid (FOLVITE) 1 mg tablet, Take 1 tablet (1,000 mcg total) by mouth daily, Disp: 30 tablet, Rfl: 2    metoprolol succinate (TOPROL-XL) 25 mg 24 hr tablet, Take 1 tablet (25 mg total) by mouth daily, Disp: 90 tablet, Rfl: 3    Objective:  /80 (BP Location: Left arm, Patient Position: Sitting, Cuff Size: Standard)   Pulse 72   Temp 97.8 °F (36.6 °C) (Tympanic)   Resp 16   Ht 5' 7\" (1.702 m)   Wt 82.6 kg (182 lb)   SpO2 98%   BMI 28.51 kg/m²     - Blood pressure: 120/80 mmHg    Cardiovascular:  - Recent echocardiogram (August) showed EF 55%, minimally decreased  - Stress test: Completed Rio Protocol for 12-14 minutes, cleared for physical activity    Laboratory/Studies:  - Known Benign Ethnic Neutropenia (LAVELLE)  - Previous lipid panel: Reported as elevated (specific values not provided)  - Hematology follow-up scheduled for March 10    Physical Exam:  The patient appears to without distress; HEENT is unremarkable, the neck has no masses or enlarged lymph nodes. Respiratory effort is normal. Lung fields are clear; the heart has a normal rhythm without murmurs. Abdomen soft without abnormalities;  Neurological with no focal deficits.    - No peripheral edema noted          Petr De La Garza MD   [facility]" [facility]

## 2025-03-05 ENCOUNTER — TELEPHONE (OUTPATIENT)
Dept: HEMATOLOGY ONCOLOGY | Facility: MEDICAL CENTER | Age: 35
End: 2025-03-05

## 2025-03-05 NOTE — TELEPHONE ENCOUNTER
Left message on patient's phone indicating to have labs drawn prior to appointment.  Indicated that the scripts are in the system, the tests are non-fasting and that patient can go to any [facility] facility to have the labs drawn.  Provided callback number [phone].    Additionally, M indicating that Dr. Sena no longer sees patients after 3:00pm, and that we would have to move him. 2:40pm on Thursday closest available late appointment.

## 2025-03-06 ENCOUNTER — APPOINTMENT (OUTPATIENT)
Dept: LAB | Facility: HOSPITAL | Age: 35
End: 2025-03-06
Payer: COMMERCIAL

## 2025-03-06 DIAGNOSIS — D70.9 NEUTROPENIA, UNSPECIFIED TYPE (HCC): ICD-10-CM

## 2025-03-06 DIAGNOSIS — E53.8 LOW FOLATE: ICD-10-CM

## 2025-03-06 DIAGNOSIS — E53.8 VITAMIN B12 DEFICIENCY: ICD-10-CM

## 2025-03-06 LAB
ALBUMIN SERPL BCG-MCNC: 4.6 G/DL (ref 3.5–5)
ALP SERPL-CCNC: 85 U/L (ref 34–104)
ALT SERPL W P-5'-P-CCNC: 28 U/L (ref 7–52)
ANION GAP SERPL CALCULATED.3IONS-SCNC: 5 MMOL/L (ref 4–13)
AST SERPL W P-5'-P-CCNC: 22 U/L (ref 13–39)
BASOPHILS # BLD AUTO: 0.02 THOUSANDS/ÂΜL (ref 0–0.1)
BASOPHILS NFR BLD AUTO: 1 % (ref 0–1)
BILIRUB SERPL-MCNC: 0.93 MG/DL (ref 0.2–1)
BUN SERPL-MCNC: 13 MG/DL (ref 5–25)
CALCIUM SERPL-MCNC: 9.8 MG/DL (ref 8.4–10.2)
CHLORIDE SERPL-SCNC: 104 MMOL/L (ref 96–108)
CO2 SERPL-SCNC: 31 MMOL/L (ref 21–32)
CREAT SERPL-MCNC: 1.27 MG/DL (ref 0.6–1.3)
EOSINOPHIL # BLD AUTO: 0.06 THOUSAND/ÂΜL (ref 0–0.61)
EOSINOPHIL NFR BLD AUTO: 2 % (ref 0–6)
ERYTHROCYTE [DISTWIDTH] IN BLOOD BY AUTOMATED COUNT: 12.2 % (ref 11.6–15.1)
FOLATE SERPL-MCNC: >22.3 NG/ML
GFR SERPL CREATININE-BSD FRML MDRD: 72 ML/MIN/1.73SQ M
GLUCOSE SERPL-MCNC: 93 MG/DL (ref 65–140)
HCT VFR BLD AUTO: 42.2 % (ref 36.5–49.3)
HGB BLD-MCNC: 15.2 G/DL (ref 12–17)
IMM GRANULOCYTES # BLD AUTO: 0.01 THOUSAND/UL (ref 0–0.2)
IMM GRANULOCYTES NFR BLD AUTO: 0 % (ref 0–2)
LYMPHOCYTES # BLD AUTO: 2.6 THOUSANDS/ÂΜL (ref 0.6–4.47)
LYMPHOCYTES NFR BLD AUTO: 64 % (ref 14–44)
MCH RBC QN AUTO: 30.9 PG (ref 26.8–34.3)
MCHC RBC AUTO-ENTMCNC: 36 G/DL (ref 31.4–37.4)
MCV RBC AUTO: 86 FL (ref 82–98)
MONOCYTES # BLD AUTO: 0.25 THOUSAND/ÂΜL (ref 0.17–1.22)
MONOCYTES NFR BLD AUTO: 6 % (ref 4–12)
NEUTROPHILS # BLD AUTO: 1.09 THOUSANDS/ÂΜL (ref 1.85–7.62)
NEUTS SEG NFR BLD AUTO: 27 % (ref 43–75)
NRBC BLD AUTO-RTO: 0 /100 WBCS
PLATELET # BLD AUTO: 255 THOUSANDS/UL (ref 149–390)
PMV BLD AUTO: 9.5 FL (ref 8.9–12.7)
POTASSIUM SERPL-SCNC: 4.2 MMOL/L (ref 3.5–5.3)
PROT SERPL-MCNC: 7.6 G/DL (ref 6.4–8.4)
RBC # BLD AUTO: 4.92 MILLION/UL (ref 3.88–5.62)
SODIUM SERPL-SCNC: 140 MMOL/L (ref 135–147)
VIT B12 SERPL-MCNC: 746 PG/ML (ref 180–914)
WBC # BLD AUTO: 4.03 THOUSAND/UL (ref 4.31–10.16)

## 2025-03-06 PROCEDURE — 82607 VITAMIN B-12: CPT

## 2025-03-06 PROCEDURE — 82746 ASSAY OF FOLIC ACID SERUM: CPT

## 2025-03-06 PROCEDURE — 85025 COMPLETE CBC W/AUTO DIFF WBC: CPT

## 2025-03-06 PROCEDURE — 80053 COMPREHEN METABOLIC PANEL: CPT

## 2025-03-06 PROCEDURE — 36415 COLL VENOUS BLD VENIPUNCTURE: CPT

## 2025-03-11 ENCOUNTER — APPOINTMENT (OUTPATIENT)
Dept: LAB | Facility: HOSPITAL | Age: 35
End: 2025-03-11
Payer: COMMERCIAL

## 2025-03-11 DIAGNOSIS — D70.9 NEUTROPENIA, UNSPECIFIED TYPE (HCC): ICD-10-CM

## 2025-03-11 DIAGNOSIS — E78.2 MIXED HYPERLIPIDEMIA: ICD-10-CM

## 2025-03-11 LAB
CHOLEST SERPL-MCNC: 143 MG/DL (ref ?–200)
HDLC SERPL-MCNC: 41 MG/DL
LDLC SERPL CALC-MCNC: 68 MG/DL (ref 0–100)
NONHDLC SERPL-MCNC: 102 MG/DL
TRIGL SERPL-MCNC: 169 MG/DL (ref ?–150)

## 2025-03-11 PROCEDURE — 80061 LIPID PANEL: CPT

## 2025-03-11 PROCEDURE — 36415 COLL VENOUS BLD VENIPUNCTURE: CPT

## 2025-03-13 ENCOUNTER — OFFICE VISIT (OUTPATIENT)
Dept: HEMATOLOGY ONCOLOGY | Facility: MEDICAL CENTER | Age: 35
End: 2025-03-13
Payer: COMMERCIAL

## 2025-03-13 ENCOUNTER — TELEPHONE (OUTPATIENT)
Dept: HEMATOLOGY ONCOLOGY | Facility: CLINIC | Age: 35
End: 2025-03-13

## 2025-03-13 VITALS
DIASTOLIC BLOOD PRESSURE: 80 MMHG | HEIGHT: 67 IN | TEMPERATURE: 98.1 F | WEIGHT: 186 LBS | SYSTOLIC BLOOD PRESSURE: 120 MMHG | RESPIRATION RATE: 17 BRPM | BODY MASS INDEX: 29.19 KG/M2 | OXYGEN SATURATION: 98 % | HEART RATE: 75 BPM

## 2025-03-13 DIAGNOSIS — D70.9 NEUTROPENIA, UNSPECIFIED TYPE (HCC): Primary | ICD-10-CM

## 2025-03-13 PROCEDURE — 99213 OFFICE O/P EST LOW 20 MIN: CPT | Performed by: INTERNAL MEDICINE

## 2025-03-13 NOTE — PROGRESS NOTES
Name: Vinod Haddad      : 1990      MRN: [id number]  Encounter Provider: Steve Sena MD  Encounter Date: 3/13/2025   Encounter department: [facility]  :  Assessment & Plan  Neutropenia, unspecified type (HCC)    34-year-old male with no significant past medical history previously found to have neutropenia.  Mr. Haddad feels well and clinically there are no concerning findings.  CBCs are trended below.  The white count and ANC are low but about the same as before.  H/H values, platelet count and the rest of differential are good/acceptable.  As above, patient is asymptomatic, no recurrent or persistent infections.  The plan is to continue with surveillance.    Patient is to return in 12 months with repeat CBC before.  We discussed what to monitor for as far as persistent fevers, recurrent/multiple infections, unplanned weight loss, persistent fatigue, excessive bruising/bleeding etc. Mr. Haddad knows to call if he has any other hematology questions or concerns.    Carefully review your medication list and verify that the list is accurate and up-to-date. Please call the hematology/oncology office if there are medications missing from the list, medications on the list that you are not currently taking or if there is a dosage or instruction that is different from how you're taking that medication.     Patient goals and areas of care: Monitor CBC parameters  Barriers to care: None  Patient is able to self-care  __________________________________________________________________________________  History of Present Illness   Chief Complaint   Patient presents with    Follow-up    Leukopenia     Pertinent Medical History     25: 34-year-old male last seen by TAMANNA HACKETT.  Mr. Haddad has a history of asymptomatic neutropenia dating back to approximately 2023.  Patient returns for follow-up.    Presently patient states feeling well, baseline.  No recurrent or  "persistent infections.  No problems with excessive bruising or bleeding.  Appetite is good, patient is trying to lose some weight.  Patient continues to be active, working full-time.  No other respiratory issues, no GI issues.  Routine health maintenance and medical care is up-to-date.     Review of Systems   Constitutional: Negative.    HENT: Negative.     Eyes: Negative.    Respiratory: Negative.     Cardiovascular: Negative.    Gastrointestinal: Negative.    Endocrine: Negative.    Genitourinary: Negative.    Musculoskeletal: Negative.    Skin: Negative.    Allergic/Immunologic: Negative.    Neurological: Negative.    Hematological: Negative.    Psychiatric/Behavioral: Negative.     All other systems reviewed and are negative.    Objective   /80 (BP Location: Left arm, Patient Position: Sitting, Cuff Size: Adult)   Pulse 75   Temp 98.1 °F (36.7 °C) (Temporal)   Resp 17   Ht 5' 7\" (1.702 m)   Wt 84.4 kg (186 lb)   SpO2 98%   BMI 29.13 kg/m²     Pain Screening:  Pain Score: 0-No pain= 0  Physical Exam  Constitutional:       Appearance: He is well-developed.   HENT:      Head: Normocephalic and atraumatic.      Right Ear: External ear normal.      Left Ear: External ear normal.   Eyes:      Conjunctiva/sclera: Conjunctivae normal.      Pupils: Pupils are equal, round, and reactive to light.   Cardiovascular:      Rate and Rhythm: Normal rate and regular rhythm.      Heart sounds: Normal heart sounds.   Pulmonary:      Effort: Pulmonary effort is normal.      Breath sounds: Normal breath sounds.   Abdominal:      General: Bowel sounds are normal.      Palpations: Abdomen is soft.   Musculoskeletal:         General: Normal range of motion.      Cervical back: Normal range of motion and neck supple.   Skin:     General: Skin is warm.   Neurological:      Mental Status: He is alert and oriented to person, place, and time.      Deep Tendon Reflexes: Reflexes are normal and symmetric.   Psychiatric:         " Behavior: Behavior normal.         Thought Content: Thought content normal.         Judgment: Judgment normal.     Extremities: No lower extremity edema bilaterally, no cords, pulses are 1+  Lymphatics: No adenopathy in the neck, supraclavicular region, axilla bilaterally    Labs: I have reviewed the following labs:  Lab Results   Component Value Date/Time    WBC 4.03 (L) 03/06/2025 01:51 PM    RBC 4.92 03/06/2025 01:51 PM    Hemoglobin 15.2 03/06/2025 01:51 PM    Hematocrit 42.2 03/06/2025 01:51 PM    MCV 86 03/06/2025 01:51 PM    MCH 30.9 03/06/2025 01:51 PM    RDW 12.2 03/06/2025 01:51 PM    Platelets 255 03/06/2025 01:51 PM    Segmented % 27 (L) 03/06/2025 01:51 PM    Lymphocytes % 64 (H) 03/06/2025 01:51 PM    Monocytes % 6 03/06/2025 01:51 PM    Eosinophils Relative 2 03/06/2025 01:51 PM    Basophils Relative 1 03/06/2025 01:51 PM    Immature Grans % 0 03/06/2025 01:51 PM    Absolute Neutrophils 1.09 (L) 03/06/2025 01:51 PM         Lab Results   Component Value Date/Time    Potassium 4.2 03/06/2025 01:51 PM    Chloride 104 03/06/2025 01:51 PM    CO2 31 03/06/2025 01:51 PM    BUN 13 03/06/2025 01:51 PM    Creatinine 1.27 03/06/2025 01:51 PM    Glucose, Fasting 95 06/18/2024 10:15 AM    Calcium 9.8 03/06/2025 01:51 PM    AST 22 03/06/2025 01:51 PM    ALT 28 03/06/2025 01:51 PM    Alkaline Phosphatase 85 03/06/2025 01:51 PM    Total Protein 7.6 03/06/2025 01:51 PM    Albumin 4.6 03/06/2025 01:51 PM    Total Bilirubin 0.93 03/06/2025 01:51 PM    eGFR 72 03/06/2025 01:51 PM     3/6/2025 folate >22.3 vitamin B12 = 746

## 2025-03-13 NOTE — TELEPHONE ENCOUNTER
Left voicemail that his 1 year follow up with Dr Sena is on 3/16/26 at 2 pm at [facility]. Left [place] # if he needs to reschedule

## 2025-03-14 ENCOUNTER — RESULTS FOLLOW-UP (OUTPATIENT)
Dept: FAMILY MEDICINE CLINIC | Facility: CLINIC | Age: 35
End: 2025-03-14

## 2025-03-14 NOTE — RESULT ENCOUNTER NOTE
Dear Vinod, the cholesterol levels are much better now.  Please continue same treatment.    Still pending CBC labs.  New blood collection is needed.

## 2025-04-22 ENCOUNTER — OFFICE VISIT (OUTPATIENT)
Dept: FAMILY MEDICINE CLINIC | Facility: CLINIC | Age: 35
End: 2025-04-22
Payer: COMMERCIAL

## 2025-04-22 VITALS
OXYGEN SATURATION: 98 % | DIASTOLIC BLOOD PRESSURE: 70 MMHG | WEIGHT: 187 LBS | SYSTOLIC BLOOD PRESSURE: 110 MMHG | RESPIRATION RATE: 16 BRPM | BODY MASS INDEX: 29.35 KG/M2 | TEMPERATURE: 97.8 F | HEIGHT: 67 IN | HEART RATE: 72 BPM

## 2025-04-22 DIAGNOSIS — B35.3 TINEA PEDIS OF BOTH FEET: ICD-10-CM

## 2025-04-22 DIAGNOSIS — Z00.01 ENCOUNTER FOR GENERAL ADULT MEDICAL EXAMINATION WITH ABNORMAL FINDINGS: Primary | ICD-10-CM

## 2025-04-22 DIAGNOSIS — Z67.A1 BENIGN ETHNIC NEUTROPENIA: ICD-10-CM

## 2025-04-22 DIAGNOSIS — I10 PRIMARY HYPERTENSION: ICD-10-CM

## 2025-04-22 DIAGNOSIS — E78.00 HYPERCHOLESTEROLEMIA: ICD-10-CM

## 2025-04-22 PROCEDURE — 99214 OFFICE O/P EST MOD 30 MIN: CPT | Performed by: FAMILY MEDICINE

## 2025-04-22 PROCEDURE — 99395 PREV VISIT EST AGE 18-39: CPT | Performed by: FAMILY MEDICINE

## 2025-04-22 RX ORDER — KETOCONAZOLE 20 MG/G
CREAM TOPICAL DAILY
Qty: 60 G | Refills: 2 | Status: SHIPPED | OUTPATIENT
Start: 2025-04-22

## 2025-04-22 RX ORDER — FLUCONAZOLE 150 MG/1
150 TABLET ORAL WEEKLY
Qty: 4 TABLET | Refills: 0 | Status: SHIPPED | OUTPATIENT
Start: 2025-04-22 | End: 2025-05-14

## 2025-04-22 NOTE — PROGRESS NOTES
Name: Vinod Haddad      : 1990      MRN: [id number]  Encounter Provider: Petr De La Garza MD  Encounter Date: 2025   Encounter department: [facility] CARE [facility] (formerly [facility]    Assessment & Plan  Encounter for general adult medical examination with abnormal findings  Vinod is here for a physical exam. I found him without any distress. The patient has the following chronic conditions   Patient Active Problem List   Diagnosis    Mixed hyperlipidemia    Coronary vasospasm (HCC)    Elevated serum creatinine    Primary hypertension    Elevated bilirubin    Neutropenia (HCC)    Leucopenia    Low folate    Vitamin B12 deficiency   .   I recommended that He exercise for at least 3.5 hours each week and maintain a healthy diet low in carbohydrates and saturated fats. I provided her with information about lifestyle modifications. The patient understands the importance of having an annual physical exam.         Tinea pedis of both feet  Discussed about using Zinc oxide to decrease mostly from daily work.  Apply to shoes.  Dry feet  Avoid use shoes during time off as possible.  Orders:    fluconazole (DIFLUCAN) 150 mg tablet; Take 1 tablet (150 mg total) by mouth once a week for 4 doses    ketoconazole (NIZORAL) 2 % cream; Apply topically daily    Primary hypertension  Hypertension.  His blood pressure is well-controlled with his current medication regimen of amLODIPine and metoprolol succinate . He will continue with his current medications.  As always exercise, control weight gain, avoid salt and diet and avoid alcohol use.        Hypercholesterolemia  Hypercholesterolemia.   His cholesterol levels are within the normal range. He will continue with his current medication, atorvastatin.       Benign ethnic neutropenia  Benign Ethnic Neutropenia.  This condition is common among individuals of [place] descent and does not require any specific treatment at this time.    "            Subjective     History of Present Illness  The patient presents for evaluation of hypertension, hypercholesterolemia, and benign ethnic neutropenia.    He is currently on a regimen of two antihypertensive medications, which have effectively managed his blood pressure.    Additionally, he is taking medication for cholesterol management.    He has been diagnosed with benign ethnic neutropenia and is under the care of a hematologist for this condition.    Supplemental Information  He is also on folic acid, omega-3, and multivitamins.    MEDICATIONS  Current: Folic acid, omega 3, multivitamin    Results  Laboratory Studies  Blood work is normal. Cholesterol levels are normal.     Allergies   Allergen Reactions    No Known Allergies      Patient Active Problem List   Diagnosis    Mixed hyperlipidemia    Coronary vasospasm (HCC)    Elevated serum creatinine    Primary hypertension    Elevated bilirubin    Neutropenia (HCC)    Leucopenia    Low folate    Vitamin B12 deficiency       Current Outpatient Medications:     amLODIPine (NORVASC) 10 mg tablet, Take 1 tablet (10 mg total) by mouth daily, Disp: 90 tablet, Rfl: 3    atorvastatin (LIPITOR) 20 mg tablet, Take 1 tablet (20 mg total) by mouth daily, Disp: 90 tablet, Rfl: 3    folic acid (FOLVITE) 1 mg tablet, Take 1 tablet (1,000 mcg total) by mouth daily, Disp: 30 tablet, Rfl: 2    metoprolol succinate (TOPROL-XL) 25 mg 24 hr tablet, Take 1 tablet (25 mg total) by mouth daily, Disp: 90 tablet, Rfl: 3    /70 (BP Location: Left arm, Patient Position: Sitting, Cuff Size: Standard)   Pulse 72   Temp 97.8 °F (36.6 °C) (Tympanic)   Resp 16   Ht 5' 7\" (1.702 m)   Wt 84.8 kg (187 lb)   SpO2 98%   BMI 29.29 kg/m²     Physical Exam  The patient appears to without distress; HEENT is unremarkable, the neck has no masses or enlarged lymph nodes. Respiratory effort is normal. Lung fields are clear; the heart has a normal rhythm without murmurs. Abdomen soft " without abnormalities;  Neurological with no focal deficits.

## 2025-04-27 NOTE — ASSESSMENT & PLAN NOTE
Hypertension.  His blood pressure is well-controlled with his current medication regimen of amLODIPine and metoprolol succinate . He will continue with his current medications.  As always exercise, control weight gain, avoid salt and diet and avoid alcohol use.

## 2025-05-21 DIAGNOSIS — E53.8 LOW FOLATE: ICD-10-CM

## 2025-05-21 RX ORDER — FOLIC ACID 1 MG/1
1000 TABLET ORAL DAILY
Qty: 30 TABLET | Refills: 5 | Status: SHIPPED | OUTPATIENT
Start: 2025-05-21

## (undated) DEVICE — CHLORAPREP HI-LITE 26ML ORANGE

## (undated) DEVICE — GLOVE SRG BIOGEL 7.5

## (undated) DEVICE — INTENDED FOR TISSUE SEPARATION, AND OTHER PROCEDURES THAT REQUIRE A SHARP SURGICAL BLADE TO PUNCTURE OR CUT.: Brand: BARD-PARKER SAFETY BLADES SIZE 15, STERILE

## (undated) DEVICE — SPONGE PVP SCRUB WING STERILE

## (undated) DEVICE — SUT PROLENE 4-0 PS-2 18 IN 8682G

## (undated) DEVICE — DRAPE EQUIPMENT RF WAND

## (undated) DEVICE — GAUZE SPONGES,16 PLY: Brand: CURITY

## (undated) DEVICE — STERILE BETHLEHEM PLASTIC HAND: Brand: CARDINAL HEALTH

## (undated) DEVICE — DISPOSABLE EQUIPMENT COVER: Brand: SMALL TOWEL DRAPE

## (undated) DEVICE — ACE WRAP 3 IN VELCRO LATEX FREE

## (undated) DEVICE — CUFF TOURNIQUET 18 X 4 IN QUICK CONNECT DISP 1 BLADDER

## (undated) DEVICE — Device

## (undated) DEVICE — OCCLUSIVE GAUZE STRIP,3% BISMUTH TRIBROMOPHENATE IN PETROLATUM BLEND: Brand: XEROFORM

## (undated) DEVICE — PADDING CAST 4 IN  COTTON STRL

## (undated) DEVICE — TUBING SUCTION 5MM X 12 FT

## (undated) DEVICE — GLOVE INDICATOR PI UNDERGLOVE SZ 8 BLUE

## (undated) DEVICE — SUT VICRYL PLUS 2-0 CTB-1 27 IN VCPB259H

## (undated) DEVICE — DRAPE C-ARM X-RAY